# Patient Record
Sex: FEMALE | Race: WHITE | NOT HISPANIC OR LATINO
[De-identification: names, ages, dates, MRNs, and addresses within clinical notes are randomized per-mention and may not be internally consistent; named-entity substitution may affect disease eponyms.]

---

## 2017-01-18 ENCOUNTER — RX RENEWAL (OUTPATIENT)
Age: 82
End: 2017-01-18

## 2017-05-03 ENCOUNTER — APPOINTMENT (OUTPATIENT)
Dept: PULMONOLOGY | Facility: CLINIC | Age: 82
End: 2017-05-03

## 2017-05-03 VITALS
BODY MASS INDEX: 22.72 KG/M2 | DIASTOLIC BLOOD PRESSURE: 76 MMHG | WEIGHT: 105 LBS | OXYGEN SATURATION: 99 % | SYSTOLIC BLOOD PRESSURE: 146 MMHG | HEART RATE: 71 BPM

## 2017-05-03 VITALS — DIASTOLIC BLOOD PRESSURE: 78 MMHG | SYSTOLIC BLOOD PRESSURE: 142 MMHG

## 2017-05-04 LAB
ALBUMIN SERPL ELPH-MCNC: 4.5 G/DL
ALP BLD-CCNC: 70 U/L
ALT SERPL-CCNC: 16 U/L
ANION GAP SERPL CALC-SCNC: 15 MMOL/L
AST SERPL-CCNC: 21 U/L
BASOPHILS # BLD AUTO: 0.03 K/UL
BASOPHILS NFR BLD AUTO: 0.4 %
BILIRUB SERPL-MCNC: <0.2 MG/DL
BUN SERPL-MCNC: 26 MG/DL
CALCIUM SERPL-MCNC: 9.7 MG/DL
CHLORIDE SERPL-SCNC: 103 MMOL/L
CO2 SERPL-SCNC: 23 MMOL/L
CREAT SERPL-MCNC: 1.17 MG/DL
EOSINOPHIL # BLD AUTO: 0.06 K/UL
EOSINOPHIL NFR BLD AUTO: 0.7 %
GLUCOSE SERPL-MCNC: 98 MG/DL
HBA1C MFR BLD HPLC: 5.8 %
HCT VFR BLD CALC: 39.4 %
HGB BLD-MCNC: 12.6 G/DL
IMM GRANULOCYTES NFR BLD AUTO: 0.2 %
LYMPHOCYTES # BLD AUTO: 2.3 K/UL
LYMPHOCYTES NFR BLD AUTO: 27.5 %
MAN DIFF?: NORMAL
MCHC RBC-ENTMCNC: 30.3 PG
MCHC RBC-ENTMCNC: 32 GM/DL
MCV RBC AUTO: 94.7 FL
MONOCYTES # BLD AUTO: 0.37 K/UL
MONOCYTES NFR BLD AUTO: 4.4 %
NEUTROPHILS # BLD AUTO: 5.57 K/UL
NEUTROPHILS NFR BLD AUTO: 66.8 %
PLATELET # BLD AUTO: 325 K/UL
POTASSIUM SERPL-SCNC: 4.5 MMOL/L
PROT SERPL-MCNC: 7.8 G/DL
RBC # BLD: 4.16 M/UL
RBC # FLD: 15 %
SODIUM SERPL-SCNC: 141 MMOL/L
WBC # FLD AUTO: 8.35 K/UL

## 2017-05-11 ENCOUNTER — RX RENEWAL (OUTPATIENT)
Age: 82
End: 2017-05-11

## 2017-08-09 ENCOUNTER — APPOINTMENT (OUTPATIENT)
Dept: PULMONOLOGY | Facility: CLINIC | Age: 82
End: 2017-08-09
Payer: MEDICARE

## 2017-08-09 VITALS
WEIGHT: 105 LBS | HEART RATE: 73 BPM | HEIGHT: 57 IN | RESPIRATION RATE: 16 BRPM | BODY MASS INDEX: 22.65 KG/M2 | OXYGEN SATURATION: 99 % | SYSTOLIC BLOOD PRESSURE: 128 MMHG | DIASTOLIC BLOOD PRESSURE: 78 MMHG

## 2017-08-09 DIAGNOSIS — M25.511 PAIN IN RIGHT SHOULDER: ICD-10-CM

## 2017-08-09 PROCEDURE — 99213 OFFICE O/P EST LOW 20 MIN: CPT

## 2017-11-01 ENCOUNTER — APPOINTMENT (OUTPATIENT)
Dept: PULMONOLOGY | Facility: CLINIC | Age: 82
End: 2017-11-01
Payer: MEDICARE

## 2017-11-01 ENCOUNTER — NON-APPOINTMENT (OUTPATIENT)
Age: 82
End: 2017-11-01

## 2017-11-01 VITALS
HEIGHT: 57 IN | DIASTOLIC BLOOD PRESSURE: 88 MMHG | HEART RATE: 75 BPM | TEMPERATURE: 97.6 F | WEIGHT: 107 LBS | OXYGEN SATURATION: 100 % | SYSTOLIC BLOOD PRESSURE: 154 MMHG | BODY MASS INDEX: 23.08 KG/M2 | RESPIRATION RATE: 16 BRPM

## 2017-11-01 PROCEDURE — 36415 COLL VENOUS BLD VENIPUNCTURE: CPT

## 2017-11-01 PROCEDURE — 99215 OFFICE O/P EST HI 40 MIN: CPT | Mod: 25

## 2017-11-01 PROCEDURE — 93000 ELECTROCARDIOGRAM COMPLETE: CPT

## 2017-11-04 ENCOUNTER — RX RENEWAL (OUTPATIENT)
Age: 82
End: 2017-11-04

## 2017-11-04 LAB
25(OH)D3 SERPL-MCNC: 16 NG/ML
ALBUMIN SERPL ELPH-MCNC: 3.9 G/DL
ALP BLD-CCNC: 67 U/L
ALT SERPL-CCNC: 17 U/L
ANION GAP SERPL CALC-SCNC: 18 MMOL/L
AST SERPL-CCNC: 22 U/L
BASOPHILS # BLD AUTO: 0.02 K/UL
BASOPHILS NFR BLD AUTO: 0.3 %
BILIRUB SERPL-MCNC: 0.3 MG/DL
BUN SERPL-MCNC: 26 MG/DL
CALCIUM SERPL-MCNC: 9.3 MG/DL
CHLORIDE SERPL-SCNC: 107 MMOL/L
CHOLEST SERPL-MCNC: 180 MG/DL
CHOLEST/HDLC SERPL: 3.3 RATIO
CK SERPL-CCNC: 133 U/L
CO2 SERPL-SCNC: 20 MMOL/L
CREAT SERPL-MCNC: 1.21 MG/DL
EOSINOPHIL # BLD AUTO: 0.02 K/UL
EOSINOPHIL NFR BLD AUTO: 0.3 %
GLUCOSE SERPL-MCNC: 88 MG/DL
HBA1C MFR BLD HPLC: 5.5 %
HCT VFR BLD CALC: 38.6 %
HDLC SERPL-MCNC: 54 MG/DL
HGB BLD-MCNC: 12 G/DL
IMM GRANULOCYTES NFR BLD AUTO: 0.1 %
LDLC SERPL CALC-MCNC: 108 MG/DL
LYMPHOCYTES # BLD AUTO: 1.74 K/UL
LYMPHOCYTES NFR BLD AUTO: 22.7 %
MAN DIFF?: NORMAL
MCHC RBC-ENTMCNC: 29.5 PG
MCHC RBC-ENTMCNC: 31.1 GM/DL
MCV RBC AUTO: 94.8 FL
MONOCYTES # BLD AUTO: 0.36 K/UL
MONOCYTES NFR BLD AUTO: 4.7 %
NEUTROPHILS # BLD AUTO: 5.53 K/UL
NEUTROPHILS NFR BLD AUTO: 71.9 %
PLATELET # BLD AUTO: 287 K/UL
POTASSIUM SERPL-SCNC: 4.3 MMOL/L
PROT SERPL-MCNC: 7.2 G/DL
RBC # BLD: 4.07 M/UL
RBC # FLD: 14.9 %
SODIUM SERPL-SCNC: 145 MMOL/L
TRIGL SERPL-MCNC: 90 MG/DL
TSH SERPL-ACNC: 1.96 UIU/ML
WBC # FLD AUTO: 7.68 K/UL

## 2018-01-29 ENCOUNTER — RX RENEWAL (OUTPATIENT)
Age: 83
End: 2018-01-29

## 2018-03-07 ENCOUNTER — APPOINTMENT (OUTPATIENT)
Dept: PULMONOLOGY | Facility: CLINIC | Age: 83
End: 2018-03-07

## 2018-04-30 ENCOUNTER — RX RENEWAL (OUTPATIENT)
Age: 83
End: 2018-04-30

## 2018-07-02 ENCOUNTER — RX RENEWAL (OUTPATIENT)
Age: 83
End: 2018-07-02

## 2018-09-05 ENCOUNTER — RX RENEWAL (OUTPATIENT)
Age: 83
End: 2018-09-05

## 2018-09-14 ENCOUNTER — APPOINTMENT (OUTPATIENT)
Dept: PULMONOLOGY | Facility: CLINIC | Age: 83
End: 2018-09-14
Payer: MEDICARE

## 2018-09-14 VITALS
RESPIRATION RATE: 14 BRPM | WEIGHT: 108 LBS | TEMPERATURE: 97.5 F | DIASTOLIC BLOOD PRESSURE: 70 MMHG | OXYGEN SATURATION: 99 % | BODY MASS INDEX: 23.37 KG/M2 | SYSTOLIC BLOOD PRESSURE: 130 MMHG | HEART RATE: 63 BPM

## 2018-09-14 PROCEDURE — 36415 COLL VENOUS BLD VENIPUNCTURE: CPT

## 2018-09-14 PROCEDURE — 99215 OFFICE O/P EST HI 40 MIN: CPT | Mod: 25

## 2018-09-21 ENCOUNTER — CLINICAL ADVICE (OUTPATIENT)
Age: 83
End: 2018-09-21

## 2018-09-21 LAB
25(OH)D3 SERPL-MCNC: 15.5 NG/ML
ALBUMIN SERPL ELPH-MCNC: 4.4 G/DL
ALP BLD-CCNC: 66 U/L
ALT SERPL-CCNC: 10 U/L
ANION GAP SERPL CALC-SCNC: 15 MMOL/L
AST SERPL-CCNC: 17 U/L
BASOPHILS # BLD AUTO: 0.02 K/UL
BASOPHILS NFR BLD AUTO: 0.2 %
BILIRUB SERPL-MCNC: 0.3 MG/DL
BUN SERPL-MCNC: 27 MG/DL
CALCIUM SERPL-MCNC: 9.6 MG/DL
CHLORIDE SERPL-SCNC: 106 MMOL/L
CHOLEST SERPL-MCNC: 168 MG/DL
CHOLEST/HDLC SERPL: 3.4 RATIO
CK SERPL-CCNC: 120 U/L
CO2 SERPL-SCNC: 23 MMOL/L
CREAT SERPL-MCNC: 1.03 MG/DL
EOSINOPHIL # BLD AUTO: 0.03 K/UL
EOSINOPHIL NFR BLD AUTO: 0.3 %
GLUCOSE SERPL-MCNC: 99 MG/DL
HBA1C MFR BLD HPLC: 5.7 %
HCT VFR BLD CALC: 40.4 %
HDLC SERPL-MCNC: 50 MG/DL
HGB BLD-MCNC: 12.4 G/DL
IMM GRANULOCYTES NFR BLD AUTO: 0.2 %
LDLC SERPL CALC-MCNC: 97 MG/DL
LYMPHOCYTES # BLD AUTO: 2.08 K/UL
LYMPHOCYTES NFR BLD AUTO: 20.4 %
MAN DIFF?: NORMAL
MCHC RBC-ENTMCNC: 29.4 PG
MCHC RBC-ENTMCNC: 30.7 GM/DL
MCV RBC AUTO: 95.7 FL
MONOCYTES # BLD AUTO: 0.52 K/UL
MONOCYTES NFR BLD AUTO: 5.1 %
NEUTROPHILS # BLD AUTO: 7.53 K/UL
NEUTROPHILS NFR BLD AUTO: 73.8 %
PLATELET # BLD AUTO: 295 K/UL
POTASSIUM SERPL-SCNC: 4.8 MMOL/L
PROT SERPL-MCNC: 7.5 G/DL
RBC # BLD: 4.22 M/UL
RBC # FLD: 14.8 %
SODIUM SERPL-SCNC: 144 MMOL/L
TRIGL SERPL-MCNC: 103 MG/DL
TSH SERPL-ACNC: 1.96 UIU/ML
VIT B12 SERPL-MCNC: 283 PG/ML
WBC # FLD AUTO: 10.2 K/UL

## 2018-11-05 ENCOUNTER — RX RENEWAL (OUTPATIENT)
Age: 83
End: 2018-11-05

## 2019-03-13 ENCOUNTER — APPOINTMENT (OUTPATIENT)
Dept: PULMONOLOGY | Facility: CLINIC | Age: 84
End: 2019-03-13
Payer: MEDICARE

## 2019-03-13 VITALS
SYSTOLIC BLOOD PRESSURE: 144 MMHG | DIASTOLIC BLOOD PRESSURE: 84 MMHG | OXYGEN SATURATION: 100 % | WEIGHT: 110 LBS | HEART RATE: 76 BPM | TEMPERATURE: 97.6 F | RESPIRATION RATE: 16 BRPM | BODY MASS INDEX: 23.8 KG/M2

## 2019-03-13 PROCEDURE — 99215 OFFICE O/P EST HI 40 MIN: CPT | Mod: 25

## 2019-03-13 PROCEDURE — 36415 COLL VENOUS BLD VENIPUNCTURE: CPT

## 2019-03-13 NOTE — PHYSICAL EXAM
[Normal Appearance] : normal appearance [General Appearance - In No Acute Distress] : no acute distress [Normal Conjunctiva] : the conjunctiva exhibited no abnormalities [Normal Oropharynx] : normal oropharynx [Neck Appearance] : the appearance of the neck was normal [Jugular Venous Distention Increased] : there was no jugular-venous distention [Thyroid Diffuse Enlargement] : the thyroid was not enlarged [Heart Rate And Rhythm] : heart rate and rhythm were normal [Heart Sounds] : normal S1 and S2 [Murmurs] : no murmurs present [Auscultation Breath Sounds / Voice Sounds] : lungs were clear to auscultation bilaterally [Bowel Sounds] : normal bowel sounds [Abdomen Soft] : soft [Abdomen Tenderness] : non-tender [Abnormal Walk] : normal gait [Nail Clubbing] : no clubbing of the fingernails [Cyanosis, Localized] : no localized cyanosis [] : no rash [No Focal Deficits] : no focal deficits [Oriented To Time, Place, And Person] : oriented to person, place, and time [Impaired Insight] : insight and judgment were intact [Erythema] : no erythema of the pharynx [FreeTextEntry1] : Crepitations noted in the right shoulder with movement. Skin intact. No swelling. No redness.

## 2019-03-13 NOTE — HISTORY OF PRESENT ILLNESS
[FreeTextEntry1] : Has been having numbness in her feet. She was seen by a podiatrist who told her she may have neuropathy. She is able to ambulate well but not as well as before. Continues to go to work. Also takes the bus. No other complaints.\par \par

## 2019-03-13 NOTE — REVIEW OF SYSTEMS
[Hypertension] : ~T hypertension [Numbness] : numbness [Memory Loss] : ~T memory lapses or loss [Fatigue] : no fatigue [Nasal Congestion] : no nasal congestion [Epistaxis] : no nosebleeds [Cough] : no cough [Dyspnea] : no dyspnea [Chest Tightness] : no chest tightness [Wheezing] : no wheezing [PND] : no PND [Orthopnea] : no orthopnea [Murmurs] : no murmurs were heard [Palpitations] : no palpitations [Edema] : ~T edema was not present [Nasal Discharge] : no nasal discharge [Heartburn] : no heartburn [Indigestion] : no indigestion [Dysuria] : no dysuria [Back Pain] : ~T no back pain [Myalgias] : no myalgias [Arthralgias] : no arthralgias [Rash] : no [unfilled] rash [Anemia] : no anemia [Easy Bruising] : no ~M tendency for easy bruising [Headache] : no headache [Dizziness] : no dizziness [Syncope] : no fainting [Focal Weakness] : no focal weakness [Paralysis] : no paralysis was seen [Seizures] : no seizures [Head Injury] : no head injury [Involuntary Movements] : ~T no involuntary movements [Tremor] : ~T no ~M tremor was seen [Anxiety] : no anxiety [Diabetes] : no diabetes mellitus [Thyroid Problem] : no thyroid problem [DVT] : no DVT [Pulmonary Embolism] : no pulmonary embolism [Difficulty Initiating Sleep] : no difficulty falling asleep [Difficulty Maintaining Sleep] : no difficulty maintaining sleep

## 2019-03-13 NOTE — DISCUSSION/SUMMARY
[FreeTextEntry1] : She is a 89 year-old woman with a history of hypertension. She also has a low vitamin D level. No history of diabetes. Complaining of numbness in her feet.\par \par Her blood pressure is adequately controlled. Blood tests will be repeated. She was advised to see a neurologist. Also she is to continue to take vitamin D. She should obtain a medical alert system given that she lives alone. Her daughter-in-law is present and agrees.\par \par I would like to see her again in three months. Sooner if needed.

## 2019-03-18 ENCOUNTER — CLINICAL ADVICE (OUTPATIENT)
Age: 84
End: 2019-03-18

## 2019-03-18 LAB
25(OH)D3 SERPL-MCNC: 30.2 NG/ML
ALBUMIN SERPL ELPH-MCNC: 4.3 G/DL
ALP BLD-CCNC: 71 U/L
ALT SERPL-CCNC: 12 U/L
ANION GAP SERPL CALC-SCNC: 16 MMOL/L
AST SERPL-CCNC: 19 U/L
BASOPHILS # BLD AUTO: 0.04 K/UL
BASOPHILS NFR BLD AUTO: 0.4 %
BILIRUB SERPL-MCNC: 0.2 MG/DL
BUN SERPL-MCNC: 28 MG/DL
CALCIUM SERPL-MCNC: 9.8 MG/DL
CHLORIDE SERPL-SCNC: 103 MMOL/L
CO2 SERPL-SCNC: 24 MMOL/L
CREAT SERPL-MCNC: 1.12 MG/DL
EOSINOPHIL # BLD AUTO: 0.04 K/UL
EOSINOPHIL NFR BLD AUTO: 0.4 %
GLUCOSE SERPL-MCNC: 107 MG/DL
HBA1C MFR BLD HPLC: 5.6 %
HCT VFR BLD CALC: 42.1 %
HGB BLD-MCNC: 12.5 G/DL
IMM GRANULOCYTES NFR BLD AUTO: 0.3 %
LYMPHOCYTES # BLD AUTO: 2.26 K/UL
LYMPHOCYTES NFR BLD AUTO: 21.5 %
MAN DIFF?: NORMAL
MCHC RBC-ENTMCNC: 29.2 PG
MCHC RBC-ENTMCNC: 29.7 GM/DL
MCV RBC AUTO: 98.4 FL
MONOCYTES # BLD AUTO: 0.6 K/UL
MONOCYTES NFR BLD AUTO: 5.7 %
NEUTROPHILS # BLD AUTO: 7.55 K/UL
NEUTROPHILS NFR BLD AUTO: 71.7 %
PLATELET # BLD AUTO: 298 K/UL
POTASSIUM SERPL-SCNC: 5.1 MMOL/L
PROT SERPL-MCNC: 7.4 G/DL
RBC # BLD: 4.28 M/UL
RBC # FLD: 14.3 %
SODIUM SERPL-SCNC: 143 MMOL/L
VIT B12 SERPL-MCNC: 227 PG/ML
WBC # FLD AUTO: 10.52 K/UL

## 2019-04-29 ENCOUNTER — APPOINTMENT (OUTPATIENT)
Dept: PULMONOLOGY | Facility: CLINIC | Age: 84
End: 2019-04-29
Payer: MEDICARE

## 2019-04-29 VITALS
BODY MASS INDEX: 24.24 KG/M2 | RESPIRATION RATE: 16 BRPM | OXYGEN SATURATION: 99 % | HEART RATE: 75 BPM | TEMPERATURE: 97.7 F | DIASTOLIC BLOOD PRESSURE: 90 MMHG | SYSTOLIC BLOOD PRESSURE: 142 MMHG | WEIGHT: 112 LBS

## 2019-04-29 DIAGNOSIS — G62.9 POLYNEUROPATHY, UNSPECIFIED: ICD-10-CM

## 2019-04-29 PROCEDURE — 36415 COLL VENOUS BLD VENIPUNCTURE: CPT

## 2019-04-29 PROCEDURE — 99214 OFFICE O/P EST MOD 30 MIN: CPT | Mod: 25

## 2019-04-29 NOTE — HISTORY OF PRESENT ILLNESS
[FreeTextEntry1] : Continues to have numbness in her feet. She was seen by Neurology. Had an MRI. Placed on gabapentin. Does not appear to be helping her much. Vitamin B12 and Vitamin D3. Also gabapentin 300 mg daily. \par

## 2019-04-29 NOTE — REVIEW OF SYSTEMS
[Hypertension] : ~T hypertension [Numbness] : numbness [Memory Loss] : ~T memory lapses or loss [Fatigue] : no fatigue [Nasal Congestion] : no nasal congestion [Epistaxis] : no nosebleeds [Cough] : no cough [Dyspnea] : no dyspnea [Wheezing] : no wheezing [Chest Discomfort] : no chest discomfort [PND] : no PND [Orthopnea] : no orthopnea [Murmurs] : no murmurs were heard [Palpitations] : no palpitations [Edema] : ~T edema was not present [Nasal Discharge] : no nasal discharge [Heartburn] : no heartburn [Indigestion] : no indigestion [Dysuria] : no dysuria [Back Pain] : ~T no back pain [Myalgias] : no myalgias [Arthralgias] : no arthralgias [Rash] : no [unfilled] rash [Anemia] : no anemia [Easy Bruising] : no ~M tendency for easy bruising [Headache] : no headache [Dizziness] : no dizziness [Syncope] : no fainting [Focal Weakness] : no focal weakness [Paralysis] : no paralysis was seen [Seizures] : no seizures [Head Injury] : no head injury [Involuntary Movements] : ~T no involuntary movements [Tremor] : ~T no ~M tremor was seen [Anxiety] : no anxiety [Diabetes] : no diabetes mellitus [Thyroid Problem] : no thyroid problem [DVT] : no DVT [Pulmonary Embolism] : no pulmonary embolism [Difficulty Initiating Sleep] : no difficulty falling asleep [Difficulty Maintaining Sleep] : no difficulty maintaining sleep

## 2019-04-29 NOTE — DISCUSSION/SUMMARY
[FreeTextEntry1] : She is a 89 year-old woman with a history of hypertension. She also has a low vitamin D level. No history of diabetes. Complaining of numbness in her feet.\par \par Her blood pressure is adequately controlled. Now on Vit B 12 oral. Will get a level. She is to continue to take vitamin D. \par \par She should obtain a medical alert system given that she lives alone. Her daughter-in-law is present and agrees.\par \par I would like to see her again in one month. Sooner if needed.

## 2019-04-30 LAB — VIT B12 SERPL-MCNC: 826 PG/ML

## 2019-06-12 ENCOUNTER — APPOINTMENT (OUTPATIENT)
Dept: PULMONOLOGY | Facility: CLINIC | Age: 84
End: 2019-06-12

## 2019-06-17 ENCOUNTER — APPOINTMENT (OUTPATIENT)
Dept: PULMONOLOGY | Facility: CLINIC | Age: 84
End: 2019-06-17
Payer: MEDICARE

## 2019-06-17 VITALS
SYSTOLIC BLOOD PRESSURE: 150 MMHG | RESPIRATION RATE: 12 BRPM | OXYGEN SATURATION: 96 % | WEIGHT: 112 LBS | HEART RATE: 75 BPM | TEMPERATURE: 97.9 F | DIASTOLIC BLOOD PRESSURE: 84 MMHG | BODY MASS INDEX: 24.24 KG/M2

## 2019-06-17 PROCEDURE — 36415 COLL VENOUS BLD VENIPUNCTURE: CPT

## 2019-06-17 PROCEDURE — 99214 OFFICE O/P EST MOD 30 MIN: CPT | Mod: 25

## 2019-06-17 NOTE — HISTORY OF PRESENT ILLNESS
[FreeTextEntry1] : She is feeing the same. She continues to have numbness in her feet. She was seen by Neurology. Had an MRI. Will need another MDI with contrast. \par \par She has been placed on gabapentin. Does not appear to be helping her much. Also taking Vitamin B 12 and Vitamin D 3. \par

## 2019-06-17 NOTE — REVIEW OF SYSTEMS
[Hypertension] : ~T hypertension [Numbness] : numbness [Memory Loss] : ~T memory lapses or loss [Fever] : no fever [Fatigue] : no fatigue [Nasal Congestion] : no nasal congestion [Epistaxis] : no nosebleeds [Cough] : no cough [Dyspnea] : no dyspnea [Chest Tightness] : no chest tightness [Wheezing] : no wheezing [Chest Discomfort] : no chest discomfort [PND] : no PND [Orthopnea] : no orthopnea [Murmurs] : no murmurs were heard [Palpitations] : no palpitations [Edema] : ~T edema was not present [Nasal Discharge] : no nasal discharge [Heartburn] : no heartburn [Indigestion] : no indigestion [Dysuria] : no dysuria [Back Pain] : ~T no back pain [Arthralgias] : no arthralgias [Myalgias] : no myalgias [Rash] : no [unfilled] rash [Anemia] : no anemia [Headache] : no headache [Easy Bruising] : no ~M tendency for easy bruising [Dizziness] : no dizziness [Syncope] : no fainting [Focal Weakness] : no focal weakness [Paralysis] : no paralysis was seen [Seizures] : no seizures [Head Injury] : no head injury [Involuntary Movements] : ~T no involuntary movements [Tremor] : ~T no ~M tremor was seen [Anxiety] : no anxiety [DVT] : no DVT [Thyroid Problem] : no thyroid problem [Diabetes] : no diabetes mellitus [Pulmonary Embolism] : no pulmonary embolism [Difficulty Initiating Sleep] : no difficulty falling asleep

## 2019-06-17 NOTE — PHYSICAL EXAM
[General Appearance - In No Acute Distress] : no acute distress [Normal Conjunctiva] : the conjunctiva exhibited no abnormalities [Normal Oropharynx] : normal oropharynx [Neck Appearance] : the appearance of the neck was normal [Thyroid Diffuse Enlargement] : the thyroid was not enlarged [Jugular Venous Distention Increased] : there was no jugular-venous distention [Heart Sounds] : normal S1 and S2 [Auscultation Breath Sounds / Voice Sounds] : lungs were clear to auscultation bilaterally [Bowel Sounds] : normal bowel sounds [Abdomen Soft] : soft [Abdomen Tenderness] : non-tender [Abnormal Walk] : normal gait [Nail Clubbing] : no clubbing of the fingernails [Cyanosis, Localized] : no localized cyanosis [] : no rash [No Focal Deficits] : no focal deficits [Oriented To Time, Place, And Person] : oriented to person, place, and time [Impaired Insight] : insight and judgment were intact [Erythema] : no erythema of the pharynx [FreeTextEntry1] : Crepitations noted in the right shoulder with movement. Skin intact. No swelling. No redness.

## 2019-06-17 NOTE — DISCUSSION/SUMMARY
[FreeTextEntry1] : She is a 89 year-old woman with a history of hypertension.  She has been complaining of numbness in her feet. Has been seen by Neurology. Work up in progress. \par \par She should obtain a medical alert system given that she lives alone. Her daughter-in-law is present and agrees.Also she does not have a cell phone. Was advised to get one. \par \par Blood work obtained. To follow up with Neurology.

## 2019-06-19 LAB
ALBUMIN SERPL ELPH-MCNC: 4.2 G/DL
ALP BLD-CCNC: 71 U/L
ALT SERPL-CCNC: 12 U/L
ANION GAP SERPL CALC-SCNC: 14 MMOL/L
AST SERPL-CCNC: 13 U/L
BASOPHILS # BLD AUTO: 0.04 K/UL
BASOPHILS NFR BLD AUTO: 0.5 %
BILIRUB SERPL-MCNC: 0.2 MG/DL
BUN SERPL-MCNC: 25 MG/DL
CALCIUM SERPL-MCNC: 10 MG/DL
CHLORIDE SERPL-SCNC: 106 MMOL/L
CHOLEST SERPL-MCNC: 172 MG/DL
CHOLEST/HDLC SERPL: 3.4 RATIO
CO2 SERPL-SCNC: 23 MMOL/L
CREAT SERPL-MCNC: 1.17 MG/DL
EOSINOPHIL # BLD AUTO: 0.06 K/UL
EOSINOPHIL NFR BLD AUTO: 0.8 %
ESTIMATED AVERAGE GLUCOSE: 117 MG/DL
GLUCOSE SERPL-MCNC: 93 MG/DL
HBA1C MFR BLD HPLC: 5.7 %
HCT VFR BLD CALC: 39.6 %
HDLC SERPL-MCNC: 50 MG/DL
HGB BLD-MCNC: 12 G/DL
IMM GRANULOCYTES NFR BLD AUTO: 0.3 %
LDLC SERPL CALC-MCNC: 102 MG/DL
LYMPHOCYTES # BLD AUTO: 2.12 K/UL
LYMPHOCYTES NFR BLD AUTO: 27.4 %
MAN DIFF?: NORMAL
MCHC RBC-ENTMCNC: 28.8 PG
MCHC RBC-ENTMCNC: 30.3 GM/DL
MCV RBC AUTO: 95.2 FL
MONOCYTES # BLD AUTO: 0.43 K/UL
MONOCYTES NFR BLD AUTO: 5.5 %
NEUTROPHILS # BLD AUTO: 5.08 K/UL
NEUTROPHILS NFR BLD AUTO: 65.5 %
PLATELET # BLD AUTO: 254 K/UL
POTASSIUM SERPL-SCNC: 4.4 MMOL/L
PROT SERPL-MCNC: 7.2 G/DL
RBC # BLD: 4.16 M/UL
RBC # FLD: 13.6 %
SODIUM SERPL-SCNC: 143 MMOL/L
TRIGL SERPL-MCNC: 98 MG/DL
WBC # FLD AUTO: 7.75 K/UL

## 2019-07-09 ENCOUNTER — APPOINTMENT (OUTPATIENT)
Dept: SPINE | Facility: CLINIC | Age: 84
End: 2019-07-09
Payer: MEDICARE

## 2019-07-09 VITALS — BODY MASS INDEX: 23.15 KG/M2 | SYSTOLIC BLOOD PRESSURE: 172 MMHG | DIASTOLIC BLOOD PRESSURE: 87 MMHG | WEIGHT: 107 LBS

## 2019-07-09 VITALS
HEIGHT: 57 IN | TEMPERATURE: 97.7 F | HEART RATE: 75 BPM | SYSTOLIC BLOOD PRESSURE: 189 MMHG | RESPIRATION RATE: 12 BRPM | DIASTOLIC BLOOD PRESSURE: 77 MMHG

## 2019-07-09 DIAGNOSIS — H91.90 UNSPECIFIED HEARING LOSS, UNSPECIFIED EAR: ICD-10-CM

## 2019-07-09 PROCEDURE — 99204 OFFICE O/P NEW MOD 45 MIN: CPT

## 2019-07-09 RX ORDER — CHLORHEXIDINE GLUCONATE 4 %
1000 LIQUID (ML) TOPICAL
Refills: 0 | Status: ACTIVE | COMMUNITY

## 2019-07-09 RX ORDER — MULTIVIT-MIN/FOLIC/VIT K/LYCOP 400-300MCG
50 MCG TABLET ORAL
Refills: 0 | Status: ACTIVE | COMMUNITY

## 2019-07-12 NOTE — CONSULT LETTER
[Dear  ___] : Dear  [unfilled], [DrCampos  ___] : Dr. MCINTYRE [FreeTextEntry2] : Umer Levi MD\par [address]\[place] [address]

## 2019-07-12 NOTE — REVIEW OF SYSTEMS
[Tingling] : tingling [Numbness] : numbness [Difficulty Walking] : difficulty walking [Abnormal Sensation] : an abnormal sensation [Negative] : Heme/Lymph [FreeTextEntry1] : no bowel or bladder symptoms

## 2019-07-12 NOTE — ASSESSMENT
[FreeTextEntry1] : Assess:\par Thoracic lesion at T 8 \par Abnormal gait / magnetic and left hand numbness\par \par PLAN:\par Recommend surgery for tumor removal and fusion\par Rehab post op most likely post operatively  \par Risks and benefits reviewed\par CT of thoracic spine preop purposes\par Patient to schedule surgery\par

## 2019-07-12 NOTE — HISTORY OF PRESENT ILLNESS
[FreeTextEntry1] : T 8 lesion  [de-identified] : 89 year old with instability  of her gait and recent left hand numbness.  She has noted progressive difficulty walking with tightness of her lower extremities.  She denies falls and has had imaging performed by a neurologist.  The images show a thoracic  lesion at T8.  She has no bowel or bladder symptoms.

## 2019-07-12 NOTE — REASON FOR VISIT
[New Patient Visit] : a new patient visit [Family Member] : family member [FreeTextEntry1] : Thoracic  tumor

## 2019-07-19 ENCOUNTER — OUTPATIENT (OUTPATIENT)
Dept: OUTPATIENT SERVICES | Facility: HOSPITAL | Age: 84
LOS: 1 days | End: 2019-07-19
Payer: MEDICARE

## 2019-07-19 VITALS
WEIGHT: 106.04 LBS | HEIGHT: 56 IN | SYSTOLIC BLOOD PRESSURE: 160 MMHG | RESPIRATION RATE: 18 BRPM | TEMPERATURE: 98 F | HEART RATE: 77 BPM | OXYGEN SATURATION: 99 % | DIASTOLIC BLOOD PRESSURE: 80 MMHG

## 2019-07-19 DIAGNOSIS — Z29.9 ENCOUNTER FOR PROPHYLACTIC MEASURES, UNSPECIFIED: ICD-10-CM

## 2019-07-19 DIAGNOSIS — S24.113A COMPLETE LESION AT T7-T10 LEVEL OF THORACIC SPINAL CORD, INITIAL ENCOUNTER: ICD-10-CM

## 2019-07-19 DIAGNOSIS — Z01.818 ENCOUNTER FOR OTHER PREPROCEDURAL EXAMINATION: ICD-10-CM

## 2019-07-19 DIAGNOSIS — Z98.890 OTHER SPECIFIED POSTPROCEDURAL STATES: Chronic | ICD-10-CM

## 2019-07-19 DIAGNOSIS — Z96.642 PRESENCE OF LEFT ARTIFICIAL HIP JOINT: Chronic | ICD-10-CM

## 2019-07-19 LAB
ANION GAP SERPL CALC-SCNC: 13 MMOL/L — SIGNIFICANT CHANGE UP (ref 5–17)
BLD GP AB SCN SERPL QL: NEGATIVE — SIGNIFICANT CHANGE UP
BUN SERPL-MCNC: 25 MG/DL — HIGH (ref 7–23)
CALCIUM SERPL-MCNC: 9.6 MG/DL — SIGNIFICANT CHANGE UP (ref 8.4–10.5)
CHLORIDE SERPL-SCNC: 105 MMOL/L — SIGNIFICANT CHANGE UP (ref 96–108)
CO2 SERPL-SCNC: 23 MMOL/L — SIGNIFICANT CHANGE UP (ref 22–31)
CREAT SERPL-MCNC: 1.08 MG/DL — SIGNIFICANT CHANGE UP (ref 0.5–1.3)
GLUCOSE SERPL-MCNC: 93 MG/DL — SIGNIFICANT CHANGE UP (ref 70–99)
HCT VFR BLD CALC: 41.6 % — SIGNIFICANT CHANGE UP (ref 34.5–45)
HGB BLD-MCNC: 12.9 G/DL — SIGNIFICANT CHANGE UP (ref 11.5–15.5)
MCHC RBC-ENTMCNC: 29.1 PG — SIGNIFICANT CHANGE UP (ref 27–34)
MCHC RBC-ENTMCNC: 31 GM/DL — LOW (ref 32–36)
MCV RBC AUTO: 93.9 FL — SIGNIFICANT CHANGE UP (ref 80–100)
PLATELET # BLD AUTO: 242 K/UL — SIGNIFICANT CHANGE UP (ref 150–400)
POTASSIUM SERPL-MCNC: 4.2 MMOL/L — SIGNIFICANT CHANGE UP (ref 3.5–5.3)
POTASSIUM SERPL-SCNC: 4.2 MMOL/L — SIGNIFICANT CHANGE UP (ref 3.5–5.3)
RBC # BLD: 4.43 M/UL — SIGNIFICANT CHANGE UP (ref 3.8–5.2)
RBC # FLD: 13.8 % — SIGNIFICANT CHANGE UP (ref 10.3–14.5)
RH IG SCN BLD-IMP: POSITIVE — SIGNIFICANT CHANGE UP
SODIUM SERPL-SCNC: 141 MMOL/L — SIGNIFICANT CHANGE UP (ref 135–145)
WBC # BLD: 8.67 K/UL — SIGNIFICANT CHANGE UP (ref 3.8–10.5)
WBC # FLD AUTO: 8.67 K/UL — SIGNIFICANT CHANGE UP (ref 3.8–10.5)

## 2019-07-19 PROCEDURE — 85027 COMPLETE CBC AUTOMATED: CPT

## 2019-07-19 PROCEDURE — 86901 BLOOD TYPING SEROLOGIC RH(D): CPT

## 2019-07-19 PROCEDURE — 86850 RBC ANTIBODY SCREEN: CPT

## 2019-07-19 PROCEDURE — 87640 STAPH A DNA AMP PROBE: CPT

## 2019-07-19 PROCEDURE — 87641 MR-STAPH DNA AMP PROBE: CPT

## 2019-07-19 PROCEDURE — G0463: CPT

## 2019-07-19 PROCEDURE — 86900 BLOOD TYPING SEROLOGIC ABO: CPT

## 2019-07-19 PROCEDURE — 80048 BASIC METABOLIC PNL TOTAL CA: CPT

## 2019-07-19 RX ORDER — CEFAZOLIN SODIUM 1 G
2000 VIAL (EA) INJECTION ONCE
Refills: 0 | Status: COMPLETED | OUTPATIENT
Start: 2019-07-29 | End: 2019-07-29

## 2019-07-19 RX ORDER — LIDOCAINE HCL 20 MG/ML
0.2 VIAL (ML) INJECTION ONCE
Refills: 0 | Status: DISCONTINUED | OUTPATIENT
Start: 2019-07-29 | End: 2019-08-03

## 2019-07-19 NOTE — H&P PST ADULT - NSICDXPROBLEM_GEN_ALL_CORE_FT
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 Surgical patients in this group will benefit from both pharmacologic prophylaxis and intermittent compression devices.  The surgical team will determine the balance between VTE risk and bleeding risk, and other clinical considerations

## 2019-07-19 NOTE — H&P PST ADULT - NSICDXPASTMEDICALHX_GEN_ALL_CORE_FT
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

## 2019-07-19 NOTE — H&P PST ADULT - PRIMARY CARE PROVIDER
Dr Gandara [phone] - Next appointment 7/24/19 Dr Gandara [facility] [phone] - Next appointment 7/24/19

## 2019-07-19 NOTE — H&P PST ADULT - HISTORY OF PRESENT ILLNESS
89 year old with c/o  instability of her gait and balance , She also noted progressive difficulty walking with tightness and numbness of her lower extremities.  Pt was seen and evaluated  by PMD/ neurologist s/p CT scan showed a thoracic lesion at T8 followed by MRI .  Pt denies any pain, no bowel or bladder symptoms. Presents to PST for scheduled  T 7- T 9 laminectomy  for tumor removal on 7/29/19.

## 2019-07-19 NOTE — H&P PST ADULT - ASSESSMENT
CAPRINI SCORE [CLOT updated 18]    AGE RELATED RISK FACTORS                                                       MOBILITY RELATED FACTORS  [ ] Age 41-60 years                                            (1 Point)                    [ ] Bed rest                                                        (1 Point)  [ ] Age: 61-74 years                                           (2 Points)                  [ ] Plaster cast                                                   (2 Points)  [ x] Age= 75 years                                              (3 Points)                    [ ] Bed bound for more than 72 hours                 (2 Points)    DISEASE RELATED RISK FACTORS                                               GENDER SPECIFIC FACTORS  [ ] Edema in the lower extremities                       (1 Point)              [ ] Pregnancy                                                     (1 Point)  [x ] Varicose veins                                               (1 Point)                     [ ] Post-partum < 6 weeks                                   (1 Point)             [ ] BMI > 25 Kg/m2                                            (1 Point)                     [ ] Hormonal therapy  or oral contraception          (1 Point)                 [ ] Sepsis (in the previous month)                        (1 Point)               [ ] History of pregnancy complications                 (1 point)  [ ] Pneumonia or serious lung disease                                               [ ] Unexplained or recurrent                     (1 Point)           (in the previous month)                               (1 Point)  [ ] Abnormal pulmonary function test                     (1 Point)                 SURGERY RELATED RISK FACTORS  [ ] Acute myocardial infarction                              (1 Point)               [ ]  Section                                             (1 Point)  [ ] Congestive heart failure (in the previous month)  (1 Point)      [ ] Minor surgery                                                  (1 Point)   [ ] Inflammatory bowel disease                             (1 Point)               [ ] Arthroscopic surgery                                        (2 Points)  [ ] Central venous access                                      (2 Points)                x[ ] General surgery lasting more than 45 minutes (2 points)  [ ] Present or previous malignancy                     (2 Points)                [ ] Elective arthroplasty                                         (5 points)    [ ] Stroke (in the previous month)                          (5 Points)                                                                                                                                                           HEMATOLOGY RELATED FACTORS                                                 TRAUMA RELATED RISK FACTORS  [ ] Prior episodes of VTE                                     (3 Points)                [ ] Fracture of the hip, pelvis, or leg                       (5 Points)  [ ] Positive family history for VTE                         (3 Points)             [ ] Acute spinal cord injury (in the previous month)  (5 Points)  [ ] Prothrombin 26136 A                                     (3 Points)               [ ] Paralysis  (less than 1 month)                             (5 Points)  [ ] Factor V Leiden                                             (3 Points)                  [ ] Multiple Trauma within 1 month                        (5 Points)  [ ] Lupus anticoagulants                                     (3 Points)                                                           [ ] Anticardiolipin antibodies                               (3 Points)                                                       [ ] High homocysteine in the blood                      (3 Points)                                             [ ] Other congenital or acquired thrombophilia      (3 Points)                                                [ ] Heparin induced thrombocytopenia                  (3 Points)                                     Total Score [     6     ]

## 2019-07-19 NOTE — H&P PST ADULT - NSICDXPASTSURGICALHX_GEN_ALL_CORE_FT
PAST SURGICAL HISTORY:  History of left hip replacement PAST SURGICAL HISTORY:  History of hemorrhoidectomy     History of left hip replacement 10 years ago

## 2019-07-20 LAB
MRSA PCR RESULT.: SIGNIFICANT CHANGE UP
S AUREUS DNA NOSE QL NAA+PROBE: SIGNIFICANT CHANGE UP

## 2019-07-24 ENCOUNTER — NON-APPOINTMENT (OUTPATIENT)
Age: 84
End: 2019-07-24

## 2019-07-24 ENCOUNTER — APPOINTMENT (OUTPATIENT)
Dept: PULMONOLOGY | Facility: CLINIC | Age: 84
End: 2019-07-24
Payer: MEDICARE

## 2019-07-24 VITALS
BODY MASS INDEX: 22.72 KG/M2 | OXYGEN SATURATION: 97 % | TEMPERATURE: 97.8 F | DIASTOLIC BLOOD PRESSURE: 72 MMHG | WEIGHT: 105 LBS | SYSTOLIC BLOOD PRESSURE: 142 MMHG | HEART RATE: 71 BPM

## 2019-07-24 PROCEDURE — 99215 OFFICE O/P EST HI 40 MIN: CPT | Mod: 25

## 2019-07-24 PROCEDURE — 93000 ELECTROCARDIOGRAM COMPLETE: CPT

## 2019-07-24 RX ORDER — GABAPENTIN 300 MG/1
300 CAPSULE ORAL
Qty: 30 | Refills: 1 | Status: DISCONTINUED | COMMUNITY
End: 2019-07-24

## 2019-07-24 NOTE — REVIEW OF SYSTEMS
[Hypertension] : ~T hypertension [Numbness] : numbness [Memory Loss] : ~T memory lapses or loss [Fever] : no fever [Fatigue] : no fatigue [Nasal Congestion] : no nasal congestion [Cough] : no cough [Epistaxis] : no nosebleeds [Dyspnea] : no dyspnea [Wheezing] : no wheezing [Chest Discomfort] : no chest discomfort [PND] : no PND [Palpitations] : no palpitations [Edema] : ~T edema was not present [Nasal Discharge] : no nasal discharge [Heartburn] : no heartburn [Indigestion] : no indigestion [Dysuria] : no dysuria [Back Pain] : ~T no back pain [Myalgias] : no myalgias [Arthralgias] : no arthralgias [Rash] : no [unfilled] rash [Headache] : no headache [Anemia] : no anemia [Easy Bruising] : no ~M tendency for easy bruising [Dizziness] : no dizziness [Syncope] : no fainting [Seizures] : no seizures [Paralysis] : no paralysis was seen [Focal Weakness] : no focal weakness [Involuntary Movements] : ~T no involuntary movements [Confusion] : no confusion [Head Injury] : no head injury [Tremor] : ~T no ~M tremor was seen [Anxiety] : no anxiety [Diabetes] : no diabetes mellitus [Thyroid Problem] : no thyroid problem [DVT] : no DVT [Pulmonary Embolism] : no pulmonary embolism [Difficulty Initiating Sleep] : no difficulty falling asleep

## 2019-07-24 NOTE — DISCUSSION/SUMMARY
[FreeTextEntry1] : She is a 89 year-old woman with a history of hypertension.  She has had no other medical problems and has been physically active until this time. She has continued to work. She has been complaining of numbness in her legs and feet. On contrast enhanced MRI done in June 2019 she was found to have a mass at T8. Surgical intervention is planned. \par \par Her overall medical condition is stable for her upcoming surgery.

## 2019-07-24 NOTE — PROCEDURE
[FreeTextEntry1] : An MRI with contrast from June 28, 2019 demonstrated a mass lesion at T8 level with compression on the spinal cord. The findings were felt to be consistent with a meningioma or a schwannoma.\par \par EKG 7/24/19: Regular sinus rhythm with poor R-wave progression.

## 2019-07-24 NOTE — HISTORY OF PRESENT ILLNESS
[FreeTextEntry1] : She is going to have surgery next week. She is feeling the same. She has numbness from the waist down. She is able to walk. No weakness.No other complaints. She is taking her medications as prescribed. She stopped the gabapentin which she said did not help her. \par

## 2019-07-24 NOTE — PHYSICAL EXAM
[General Appearance - In No Acute Distress] : no acute distress [Normal Oropharynx] : normal oropharynx [Neck Appearance] : the appearance of the neck was normal [Jugular Venous Distention Increased] : there was no jugular-venous distention [Auscultation Breath Sounds / Voice Sounds] : lungs were clear to auscultation bilaterally [Thyroid Diffuse Enlargement] : the thyroid was not enlarged [Heart Sounds] : normal S1 and S2 [Bowel Sounds] : normal bowel sounds [Abdomen Tenderness] : non-tender [Abdomen Soft] : soft [Abnormal Walk] : normal gait [Nail Clubbing] : no clubbing of the fingernails [Cyanosis, Localized] : no localized cyanosis [] : no rash [No Focal Deficits] : no focal deficits [Oriented To Time, Place, And Person] : oriented to person, place, and time [Impaired Insight] : insight and judgment were intact [Murmurs] : no murmurs present [Erythema] : no erythema of the pharynx [FreeTextEntry1] : Crepitations noted in the right shoulder with movement. Skin intact. No swelling. No redness.

## 2019-07-28 ENCOUNTER — TRANSCRIPTION ENCOUNTER (OUTPATIENT)
Age: 84
End: 2019-07-28

## 2019-07-28 NOTE — PRE-ANESTHESIA EVALUATION ADULT - NSANTHPMHFT_GEN_ALL_CORE
89 year old with c/o  instability of her gait and balance , She also noted progressive difficulty walking with tightness and numbness of her lower extremities.  Pt was seen and evaluated  by PMD/ neurologist s/p CT scan showed a thoracic lesion at T8 followed by MRI .  Pt denies any pain, no bowel or bladder symptoms. Presents to PST for scheduled  T 7- T 9 laminectomy  for tumor removal 89 year old with c/o  instability of her gait and balance , She also noted progressive difficulty walking with tightness and numbness of her lower extremities.  Pt was seen and evaluated  by PMD/ neurologist s/p CT scan showed a thoracic lesion at T8 followed by MRI .  Pt denies any pain, no bowel or bladder symptoms.

## 2019-07-28 NOTE — PRE-ANESTHESIA EVALUATION ADULT - NSANTHAIRWAYFT_ENT_ALL_CORE
Thyromental distance:  Interincisor distance:  Neck: Thyromental distance: >3FB  Interincisor distance:  >2cm  Neck: ROM with extension/flexion grossly in tact

## 2019-07-28 NOTE — PRE-ANESTHESIA EVALUATION ADULT - NSANTHPEFT_GEN_ALL_CORE
General: well appearing elderly female  Cardiovascular: RRR, no murmurs appreciated  Respiratory: CTA B/L

## 2019-07-28 NOTE — PRE-ANESTHESIA EVALUATION ADULT - NSANTHOSAYNRD_GEN_A_CORE
No. ERIC screening performed.  STOP BANG Legend: 0-2 = LOW Risk; 3-4 = INTERMEDIATE Risk; 5-8 = HIGH Risk

## 2019-07-28 NOTE — PRE-ANESTHESIA EVALUATION ADULT - NSDENTALSD_ENT_ALL_CORE
Partial Upper Dentures/caps/bridge/implants caps/bridge/implants/Partial Upper Dentures, no loose teeth

## 2019-07-29 ENCOUNTER — RESULT REVIEW (OUTPATIENT)
Age: 84
End: 2019-07-29

## 2019-07-29 ENCOUNTER — APPOINTMENT (OUTPATIENT)
Dept: SPINE | Facility: HOSPITAL | Age: 84
End: 2019-07-29
Payer: MEDICARE

## 2019-07-29 ENCOUNTER — INPATIENT (INPATIENT)
Facility: HOSPITAL | Age: 84
LOS: 4 days | Discharge: HOME | DRG: 29 | End: 2019-08-03
Attending: NEUROLOGICAL SURGERY | Admitting: NEUROLOGICAL SURGERY
Payer: MEDICARE

## 2019-07-29 VITALS
SYSTOLIC BLOOD PRESSURE: 143 MMHG | DIASTOLIC BLOOD PRESSURE: 77 MMHG | OXYGEN SATURATION: 96 % | TEMPERATURE: 98 F | HEART RATE: 73 BPM | WEIGHT: 104.94 LBS | RESPIRATION RATE: 16 BRPM | HEIGHT: 60 IN

## 2019-07-29 DIAGNOSIS — Z96.642 PRESENCE OF LEFT ARTIFICIAL HIP JOINT: Chronic | ICD-10-CM

## 2019-07-29 DIAGNOSIS — S24.113A COMPLETE LESION AT T7-T10 LEVEL OF THORACIC SPINAL CORD, INITIAL ENCOUNTER: ICD-10-CM

## 2019-07-29 DIAGNOSIS — Z98.890 OTHER SPECIFIED POSTPROCEDURAL STATES: Chronic | ICD-10-CM

## 2019-07-29 LAB
ANION GAP SERPL CALC-SCNC: 15 MMOL/L — SIGNIFICANT CHANGE UP (ref 5–17)
BASOPHILS # BLD AUTO: 0 K/UL — SIGNIFICANT CHANGE UP (ref 0–0.2)
BASOPHILS NFR BLD AUTO: 0.1 % — SIGNIFICANT CHANGE UP (ref 0–2)
BUN SERPL-MCNC: 27 MG/DL — HIGH (ref 7–23)
CALCIUM SERPL-MCNC: 8 MG/DL — LOW (ref 8.4–10.5)
CHLORIDE SERPL-SCNC: 102 MMOL/L — SIGNIFICANT CHANGE UP (ref 96–108)
CO2 SERPL-SCNC: 20 MMOL/L — LOW (ref 22–31)
CREAT SERPL-MCNC: 1.3 MG/DL — SIGNIFICANT CHANGE UP (ref 0.5–1.3)
EOSINOPHIL # BLD AUTO: 0 K/UL — SIGNIFICANT CHANGE UP (ref 0–0.5)
EOSINOPHIL NFR BLD AUTO: 0.2 % — SIGNIFICANT CHANGE UP (ref 0–6)
GLUCOSE BLDC GLUCOMTR-MCNC: 95 MG/DL — SIGNIFICANT CHANGE UP (ref 70–99)
GLUCOSE SERPL-MCNC: 147 MG/DL — HIGH (ref 70–99)
HCT VFR BLD CALC: 35.2 % — SIGNIFICANT CHANGE UP (ref 34.5–45)
HGB BLD-MCNC: 11.7 G/DL — SIGNIFICANT CHANGE UP (ref 11.5–15.5)
LYMPHOCYTES # BLD AUTO: 0.7 K/UL — LOW (ref 1–3.3)
LYMPHOCYTES # BLD AUTO: 7.8 % — LOW (ref 13–44)
MCHC RBC-ENTMCNC: 30.7 PG — SIGNIFICANT CHANGE UP (ref 27–34)
MCHC RBC-ENTMCNC: 33.4 GM/DL — SIGNIFICANT CHANGE UP (ref 32–36)
MCV RBC AUTO: 92.1 FL — SIGNIFICANT CHANGE UP (ref 80–100)
MONOCYTES # BLD AUTO: 0.1 K/UL — SIGNIFICANT CHANGE UP (ref 0–0.9)
MONOCYTES NFR BLD AUTO: 1.4 % — LOW (ref 2–14)
NEUTROPHILS # BLD AUTO: 7.7 K/UL — HIGH (ref 1.8–7.4)
NEUTROPHILS NFR BLD AUTO: 90.5 % — HIGH (ref 43–77)
PLATELET # BLD AUTO: 219 K/UL — SIGNIFICANT CHANGE UP (ref 150–400)
POTASSIUM SERPL-MCNC: 4.4 MMOL/L — SIGNIFICANT CHANGE UP (ref 3.5–5.3)
POTASSIUM SERPL-SCNC: 4.4 MMOL/L — SIGNIFICANT CHANGE UP (ref 3.5–5.3)
RBC # BLD: 3.82 M/UL — SIGNIFICANT CHANGE UP (ref 3.8–5.2)
RBC # FLD: 12.5 % — SIGNIFICANT CHANGE UP (ref 10.3–14.5)
RH IG SCN BLD-IMP: POSITIVE — SIGNIFICANT CHANGE UP
SODIUM SERPL-SCNC: 137 MMOL/L — SIGNIFICANT CHANGE UP (ref 135–145)
WBC # BLD: 8.5 K/UL — SIGNIFICANT CHANGE UP (ref 3.8–10.5)
WBC # FLD AUTO: 8.5 K/UL — SIGNIFICANT CHANGE UP (ref 3.8–10.5)

## 2019-07-29 PROCEDURE — 63281 BX/EXC IDRL SPINE LESN THRC: CPT

## 2019-07-29 PROCEDURE — 69990 MICROSURGERY ADD-ON: CPT

## 2019-07-29 PROCEDURE — 88342 IMHCHEM/IMCYTCHM 1ST ANTB: CPT | Mod: 26,59

## 2019-07-29 PROCEDURE — 88360 TUMOR IMMUNOHISTOCHEM/MANUAL: CPT | Mod: 26

## 2019-07-29 PROCEDURE — 88307 TISSUE EXAM BY PATHOLOGIST: CPT | Mod: 26

## 2019-07-29 RX ORDER — SODIUM CHLORIDE 9 MG/ML
1000 INJECTION INTRAMUSCULAR; INTRAVENOUS; SUBCUTANEOUS
Refills: 0 | Status: DISCONTINUED | OUTPATIENT
Start: 2019-07-29 | End: 2019-07-30

## 2019-07-29 RX ORDER — PANTOPRAZOLE SODIUM 20 MG/1
40 TABLET, DELAYED RELEASE ORAL
Refills: 0 | Status: DISCONTINUED | OUTPATIENT
Start: 2019-07-29 | End: 2019-08-03

## 2019-07-29 RX ORDER — OXYCODONE HYDROCHLORIDE 5 MG/1
10 TABLET ORAL EVERY 6 HOURS
Refills: 0 | Status: DISCONTINUED | OUTPATIENT
Start: 2019-07-29 | End: 2019-08-03

## 2019-07-29 RX ORDER — ASCORBIC ACID 60 MG
500 TABLET,CHEWABLE ORAL
Refills: 0 | Status: DISCONTINUED | OUTPATIENT
Start: 2019-07-29 | End: 2019-08-03

## 2019-07-29 RX ORDER — FOLIC ACID 0.8 MG
1 TABLET ORAL DAILY
Refills: 0 | Status: DISCONTINUED | OUTPATIENT
Start: 2019-07-29 | End: 2019-08-03

## 2019-07-29 RX ORDER — FENTANYL CITRATE 50 UG/ML
25 INJECTION INTRAVENOUS
Refills: 0 | Status: DISCONTINUED | OUTPATIENT
Start: 2019-07-29 | End: 2019-07-29

## 2019-07-29 RX ORDER — CHLORHEXIDINE GLUCONATE 213 G/1000ML
1 SOLUTION TOPICAL DAILY
Refills: 0 | Status: DISCONTINUED | OUTPATIENT
Start: 2019-07-29 | End: 2019-07-29

## 2019-07-29 RX ORDER — ONDANSETRON 8 MG/1
4 TABLET, FILM COATED ORAL ONCE
Refills: 0 | Status: COMPLETED | OUTPATIENT
Start: 2019-07-29 | End: 2019-07-29

## 2019-07-29 RX ORDER — DEXAMETHASONE 0.5 MG/5ML
4 ELIXIR ORAL EVERY 6 HOURS
Refills: 0 | Status: DISCONTINUED | OUTPATIENT
Start: 2019-07-29 | End: 2019-07-31

## 2019-07-29 RX ORDER — ACETAMINOPHEN 500 MG
650 TABLET ORAL EVERY 6 HOURS
Refills: 0 | Status: DISCONTINUED | OUTPATIENT
Start: 2019-07-29 | End: 2019-08-03

## 2019-07-29 RX ORDER — PREGABALIN 225 MG/1
1000 CAPSULE ORAL DAILY
Refills: 0 | Status: DISCONTINUED | OUTPATIENT
Start: 2019-07-29 | End: 2019-08-03

## 2019-07-29 RX ORDER — HYDROMORPHONE HYDROCHLORIDE 2 MG/ML
1 INJECTION INTRAMUSCULAR; INTRAVENOUS; SUBCUTANEOUS EVERY 6 HOURS
Refills: 0 | Status: DISCONTINUED | OUTPATIENT
Start: 2019-07-29 | End: 2019-08-02

## 2019-07-29 RX ORDER — OXYCODONE HYDROCHLORIDE 5 MG/1
5 TABLET ORAL EVERY 6 HOURS
Refills: 0 | Status: DISCONTINUED | OUTPATIENT
Start: 2019-07-29 | End: 2019-08-03

## 2019-07-29 RX ORDER — SENNA PLUS 8.6 MG/1
2 TABLET ORAL AT BEDTIME
Refills: 0 | Status: DISCONTINUED | OUTPATIENT
Start: 2019-07-29 | End: 2019-08-03

## 2019-07-29 RX ORDER — VITAMIN E 100 UNIT
1 CAPSULE ORAL
Qty: 0 | Refills: 0 | DISCHARGE

## 2019-07-29 RX ORDER — CEFAZOLIN SODIUM 1 G
2000 VIAL (EA) INJECTION EVERY 8 HOURS
Refills: 0 | Status: COMPLETED | OUTPATIENT
Start: 2019-07-29 | End: 2019-07-30

## 2019-07-29 RX ORDER — HYDROMORPHONE HYDROCHLORIDE 2 MG/ML
0.25 INJECTION INTRAMUSCULAR; INTRAVENOUS; SUBCUTANEOUS ONCE
Refills: 0 | Status: DISCONTINUED | OUTPATIENT
Start: 2019-07-29 | End: 2019-07-29

## 2019-07-29 RX ORDER — SODIUM CHLORIDE 9 MG/ML
3 INJECTION INTRAMUSCULAR; INTRAVENOUS; SUBCUTANEOUS EVERY 8 HOURS
Refills: 0 | Status: DISCONTINUED | OUTPATIENT
Start: 2019-07-29 | End: 2019-07-29

## 2019-07-29 RX ORDER — FENTANYL CITRATE 50 UG/ML
50 INJECTION INTRAVENOUS
Refills: 0 | Status: DISCONTINUED | OUTPATIENT
Start: 2019-07-29 | End: 2019-07-29

## 2019-07-29 RX ORDER — DOCUSATE SODIUM 100 MG
100 CAPSULE ORAL THREE TIMES A DAY
Refills: 0 | Status: DISCONTINUED | OUTPATIENT
Start: 2019-07-29 | End: 2019-08-03

## 2019-07-29 RX ORDER — LIDOCAINE HCL 20 MG/ML
0.2 VIAL (ML) INJECTION ONCE
Refills: 0 | Status: COMPLETED | OUTPATIENT
Start: 2019-07-29 | End: 2019-07-29

## 2019-07-29 RX ADMIN — HYDROMORPHONE HYDROCHLORIDE 0.25 MILLIGRAM(S): 2 INJECTION INTRAMUSCULAR; INTRAVENOUS; SUBCUTANEOUS at 12:15

## 2019-07-29 RX ADMIN — Medication 100 MILLIGRAM(S): at 14:43

## 2019-07-29 RX ADMIN — Medication 100 MILLIGRAM(S): at 17:13

## 2019-07-29 RX ADMIN — HYDROMORPHONE HYDROCHLORIDE 0.25 MILLIGRAM(S): 2 INJECTION INTRAMUSCULAR; INTRAVENOUS; SUBCUTANEOUS at 12:00

## 2019-07-29 RX ADMIN — Medication 650 MILLIGRAM(S): at 22:15

## 2019-07-29 RX ADMIN — SODIUM CHLORIDE 3 MILLILITER(S): 9 INJECTION INTRAMUSCULAR; INTRAVENOUS; SUBCUTANEOUS at 06:19

## 2019-07-29 RX ADMIN — ONDANSETRON 4 MILLIGRAM(S): 8 TABLET, FILM COATED ORAL at 17:12

## 2019-07-29 RX ADMIN — Medication 4 MILLIGRAM(S): at 16:24

## 2019-07-29 RX ADMIN — FENTANYL CITRATE 25 MICROGRAM(S): 50 INJECTION INTRAVENOUS at 11:15

## 2019-07-29 RX ADMIN — Medication 100 MILLIGRAM(S): at 21:44

## 2019-07-29 RX ADMIN — Medication 650 MILLIGRAM(S): at 21:45

## 2019-07-29 RX ADMIN — SODIUM CHLORIDE 75 MILLILITER(S): 9 INJECTION INTRAMUSCULAR; INTRAVENOUS; SUBCUTANEOUS at 15:44

## 2019-07-29 RX ADMIN — Medication 0.2 MILLILITER(S): at 06:18

## 2019-07-29 RX ADMIN — CHLORHEXIDINE GLUCONATE 1 APPLICATION(S): 213 SOLUTION TOPICAL at 06:19

## 2019-07-29 RX ADMIN — Medication 4 MILLIGRAM(S): at 21:44

## 2019-07-29 RX ADMIN — SENNA PLUS 2 TABLET(S): 8.6 TABLET ORAL at 21:49

## 2019-07-29 RX ADMIN — FENTANYL CITRATE 25 MICROGRAM(S): 50 INJECTION INTRAVENOUS at 11:30

## 2019-07-29 NOTE — PROGRESS NOTE ADULT - SUBJECTIVE AND OBJECTIVE BOX
Patient seen and examined at bedside.    --Anticoagulation--    T(C): 36 (07-29-19 @ 11:05), Max: 36.7 (07-29-19 @ 05:46)  HR: 63 (07-29-19 @ 15:00) (61 - 73)  BP: 161/74 (07-29-19 @ 15:00) (132/58 - 162/71)  RR: 16 (07-29-19 @ 15:00) (16 - 18)  SpO2: 94% (07-29-19 @ 15:00) (94% - 100%)  Wt(kg): --    Exam:  Exam:  AOx3, Following Commands  Motor:          Upper extremity                       Delt      Bicep     Tricep     HG                                                 L         5/5        5/5          5/5        5/5                                               R          5/5       5/5          5/5        5/5          Lower extremity                           HF          KF         KE         DF        PF                                                  L           5/5         5/5        5/5       5/5        5/5                                               R           5/5         5/5        5/5       5/5        5/5  Sensation / Reflexes  [ x] intact to light touch  [ ] decreased:   No clonus

## 2019-07-29 NOTE — PATIENT PROFILE ADULT - FAMILY NEEDS
no Cheiloplasty (Less Than 50%) Text: A decision was made to reconstruct the defect with a  cheiloplasty.  The defect was undermined extensively.  Additional obicularis oris muscle was excised with a 15 blade scalpel.  The defect was converted into a full thickness wedge, of less than 50% of the vertical height of the lip, to facilite a better cosmetic result.  Small vessels were then tied off with 5-0 monocyrl. The obicularis oris, superficial fascia, adipose and dermis were then reapproximated.  After the deeper layers were approximated the epidermis was reapproximated with particular care given to realign the vermilion border.

## 2019-07-30 LAB
ALBUMIN SERPL ELPH-MCNC: 3.3 G/DL — SIGNIFICANT CHANGE UP (ref 3.3–5)
ANION GAP SERPL CALC-SCNC: 11 MMOL/L — SIGNIFICANT CHANGE UP (ref 5–17)
ANION GAP SERPL CALC-SCNC: 14 MMOL/L — SIGNIFICANT CHANGE UP (ref 5–17)
BASOPHILS # BLD AUTO: 0.01 K/UL — SIGNIFICANT CHANGE UP (ref 0–0.2)
BASOPHILS NFR BLD AUTO: 0.1 % — SIGNIFICANT CHANGE UP (ref 0–2)
BUN SERPL-MCNC: 27 MG/DL — HIGH (ref 7–23)
BUN SERPL-MCNC: 29 MG/DL — HIGH (ref 7–23)
CALCIUM SERPL-MCNC: 7.4 MG/DL — LOW (ref 8.4–10.5)
CALCIUM SERPL-MCNC: 8.3 MG/DL — LOW (ref 8.4–10.5)
CHLORIDE SERPL-SCNC: 104 MMOL/L — SIGNIFICANT CHANGE UP (ref 96–108)
CHLORIDE SERPL-SCNC: 105 MMOL/L — SIGNIFICANT CHANGE UP (ref 96–108)
CO2 SERPL-SCNC: 20 MMOL/L — LOW (ref 22–31)
CO2 SERPL-SCNC: 21 MMOL/L — LOW (ref 22–31)
CREAT SERPL-MCNC: 1.22 MG/DL — SIGNIFICANT CHANGE UP (ref 0.5–1.3)
CREAT SERPL-MCNC: 1.26 MG/DL — SIGNIFICANT CHANGE UP (ref 0.5–1.3)
EOSINOPHIL # BLD AUTO: 0 K/UL — SIGNIFICANT CHANGE UP (ref 0–0.5)
EOSINOPHIL NFR BLD AUTO: 0 % — SIGNIFICANT CHANGE UP (ref 0–6)
GLUCOSE SERPL-MCNC: 126 MG/DL — HIGH (ref 70–99)
GLUCOSE SERPL-MCNC: 159 MG/DL — HIGH (ref 70–99)
HCT VFR BLD CALC: 34 % — LOW (ref 34.5–45)
HGB BLD-MCNC: 10.4 G/DL — LOW (ref 11.5–15.5)
IMM GRANULOCYTES NFR BLD AUTO: 0.4 % — SIGNIFICANT CHANGE UP (ref 0–1.5)
LYMPHOCYTES # BLD AUTO: 0.79 K/UL — LOW (ref 1–3.3)
LYMPHOCYTES # BLD AUTO: 5.9 % — LOW (ref 13–44)
MCHC RBC-ENTMCNC: 28.7 PG — SIGNIFICANT CHANGE UP (ref 27–34)
MCHC RBC-ENTMCNC: 30.6 GM/DL — LOW (ref 32–36)
MCV RBC AUTO: 93.9 FL — SIGNIFICANT CHANGE UP (ref 80–100)
MONOCYTES # BLD AUTO: 0.35 K/UL — SIGNIFICANT CHANGE UP (ref 0–0.9)
MONOCYTES NFR BLD AUTO: 2.6 % — SIGNIFICANT CHANGE UP (ref 2–14)
NEUTROPHILS # BLD AUTO: 12.14 K/UL — HIGH (ref 1.8–7.4)
NEUTROPHILS NFR BLD AUTO: 91 % — HIGH (ref 43–77)
PLATELET # BLD AUTO: 210 K/UL — SIGNIFICANT CHANGE UP (ref 150–400)
POTASSIUM SERPL-MCNC: 4.6 MMOL/L — SIGNIFICANT CHANGE UP (ref 3.5–5.3)
POTASSIUM SERPL-MCNC: 5.1 MMOL/L — SIGNIFICANT CHANGE UP (ref 3.5–5.3)
POTASSIUM SERPL-SCNC: 4.6 MMOL/L — SIGNIFICANT CHANGE UP (ref 3.5–5.3)
POTASSIUM SERPL-SCNC: 5.1 MMOL/L — SIGNIFICANT CHANGE UP (ref 3.5–5.3)
RBC # BLD: 3.62 M/UL — LOW (ref 3.8–5.2)
RBC # FLD: 13.8 % — SIGNIFICANT CHANGE UP (ref 10.3–14.5)
SODIUM SERPL-SCNC: 136 MMOL/L — SIGNIFICANT CHANGE UP (ref 135–145)
SODIUM SERPL-SCNC: 139 MMOL/L — SIGNIFICANT CHANGE UP (ref 135–145)
WBC # BLD: 13.35 K/UL — HIGH (ref 3.8–10.5)
WBC # FLD AUTO: 13.35 K/UL — HIGH (ref 3.8–10.5)

## 2019-07-30 PROCEDURE — 93970 EXTREMITY STUDY: CPT | Mod: 26

## 2019-07-30 RX ORDER — ENOXAPARIN SODIUM 100 MG/ML
30 INJECTION SUBCUTANEOUS AT BEDTIME
Refills: 0 | Status: DISCONTINUED | OUTPATIENT
Start: 2019-07-30 | End: 2019-08-03

## 2019-07-30 RX ADMIN — OXYCODONE HYDROCHLORIDE 5 MILLIGRAM(S): 5 TABLET ORAL at 15:43

## 2019-07-30 RX ADMIN — OXYCODONE HYDROCHLORIDE 5 MILLIGRAM(S): 5 TABLET ORAL at 22:10

## 2019-07-30 RX ADMIN — OXYCODONE HYDROCHLORIDE 5 MILLIGRAM(S): 5 TABLET ORAL at 08:15

## 2019-07-30 RX ADMIN — Medication 500 MILLIGRAM(S): at 18:06

## 2019-07-30 RX ADMIN — Medication 4 MILLIGRAM(S): at 15:43

## 2019-07-30 RX ADMIN — OXYCODONE HYDROCHLORIDE 5 MILLIGRAM(S): 5 TABLET ORAL at 16:15

## 2019-07-30 RX ADMIN — Medication 100 MILLIGRAM(S): at 09:14

## 2019-07-30 RX ADMIN — Medication 500 MILLIGRAM(S): at 05:40

## 2019-07-30 RX ADMIN — PREGABALIN 1000 MICROGRAM(S): 225 CAPSULE ORAL at 11:42

## 2019-07-30 RX ADMIN — Medication 4 MILLIGRAM(S): at 05:40

## 2019-07-30 RX ADMIN — Medication 650 MILLIGRAM(S): at 12:12

## 2019-07-30 RX ADMIN — SENNA PLUS 2 TABLET(S): 8.6 TABLET ORAL at 22:03

## 2019-07-30 RX ADMIN — Medication 100 MILLIGRAM(S): at 05:40

## 2019-07-30 RX ADMIN — Medication 1 TABLET(S): at 11:42

## 2019-07-30 RX ADMIN — OXYCODONE HYDROCHLORIDE 5 MILLIGRAM(S): 5 TABLET ORAL at 22:45

## 2019-07-30 RX ADMIN — Medication 100 MILLIGRAM(S): at 22:03

## 2019-07-30 RX ADMIN — Medication 650 MILLIGRAM(S): at 11:42

## 2019-07-30 RX ADMIN — Medication 4 MILLIGRAM(S): at 22:03

## 2019-07-30 RX ADMIN — OXYCODONE HYDROCHLORIDE 5 MILLIGRAM(S): 5 TABLET ORAL at 07:44

## 2019-07-30 RX ADMIN — Medication 100 MILLIGRAM(S): at 01:46

## 2019-07-30 RX ADMIN — ENOXAPARIN SODIUM 30 MILLIGRAM(S): 100 INJECTION SUBCUTANEOUS at 22:04

## 2019-07-30 RX ADMIN — Medication 4 MILLIGRAM(S): at 09:14

## 2019-07-30 RX ADMIN — Medication 1 MILLIGRAM(S): at 11:42

## 2019-07-30 RX ADMIN — Medication 100 MILLIGRAM(S): at 13:34

## 2019-07-30 RX ADMIN — PANTOPRAZOLE SODIUM 40 MILLIGRAM(S): 20 TABLET, DELAYED RELEASE ORAL at 05:41

## 2019-07-30 NOTE — CHART NOTE - NSCHARTNOTEFT_GEN_A_CORE
CAPRINI SCORE [CLOT] Score on Admission for     AGE RELATED RISK FACTORS                                                          MOBILITY RELATED FACTORS  [ ] Age 41-60 years                                            (1 Point)                  [ ] Bed rest                                                        (1 Point)  [ ] Age: 61-74 years                                           (2 Points)                [ ] Plaster cast                                                   (2 Points)  [x] Age= 75 years                                              (3 Points)                  [ ] Bed bound for more than 72 hours                 (2 Points)    DISEASE RELATED RISK FACTORS                                                   GENDER SPECIFIC FACTORS  [ ] Edema in the lower extremities                       (1 Point)            [ ] Pregnancy                                                     (1 Point)  [ ] Varicose veins                                               (1 Point)                   [ ] Post-partum < 6 weeks                                   (1 Point)             [ ] BMI > 25 Kg/m2                                            (1 Point)                   [ ] Hormonal therapy  or oral contraception          (1 Point)                 [ ] Sepsis (in the previous month)                        (1 Point)             [ ] History of pregnancy complications                 (1 point)  [ ] Pneumonia or serious lung disease                                             [ ] Unexplained or recurrent                     (1 Point)           (in the previous month)                               (1 Point)  [ ] Abnormal pulmonary function test                     (1 Point)         SURGERY RELATED RISK FACTORS (include planned surgeries)  [ ] Acute myocardial infarction                              (1 Point)               [ ]  Section                                             (1 Point)  [ ] Congestive heart failure (in the previous month)  (1 Point)      [ ] Minor surgery                                                  (1 Point)   [ ] Inflammatory bowel disease                             (1 Point)               [ ] Arthroscopic surgery                                        (2 Points)  [ ] Central venous access                                      (2 Points)                [x] General surgery lasting more than 45 minutes   (2 Points)       [ ] Stroke (in the previous month)                          (5 Points)            [ ] Elective arthroplasty                                         (5 Points)            [ ] current or past malignancy                              (2 Points)                                                                                                       HEMATOLOGY RELATED FACTORS                                                    TRAUMA RELATED RISK FACTORS  [ ] Prior episodes of VTE                                     (3 Points)                [ ] Fracture of the hip, pelvis, or leg                       (5 Points)  [ ] Positive family history for VTE                         (3 Points)             [ ] Acute spinal cord injury (in the previous month)  (5 Points)  [ ] Prothrombin 15693 A                                     (3 Points)                [ ] Paralysis  (less than 1 month)                             (5 Points)  [ ] Factor V Leiden                                             (3 Points)                   [ ] Multiple Trauma within 1 month                        (5 Points)  [ ] Lupus anticoagulants                                     (3 Points)                                                           [ ] Anticardiolipin antibodies                               (3 Points)                                                       [ ] High homocysteine in the blood                      (3 Points)                                             [ ] Other congenital or acquired thrombophilia      (3 Points)                                                [ ] Heparin induced thrombocytopenia                  (3 Points)                                          Total Score [     5     ]    Risk:  Very low 0   Low 1 to 2   Moderate 3 to 4   High =5       VTE Prophylaxis Recommendations:  [x] mechanical pneumatic compression devices                                      [ ] contraindicated: _____________________  [x] chemo prophylaxis                                                                                   [ ] contraindicated _____________________    **** HIGH LIKELIHOOD DVT PRESENT ON ADMISSION  [x] (please order LE dopplers within 24 hours of admission)

## 2019-07-30 NOTE — PROGRESS NOTE ADULT - ASSESSMENT
HPI:  Patient is a 89 year old female with gait and balance instability with difficulty walking.  CT revealed T8 lesion.  Now presented for surgery.    PROCEDURE: s/p t7-t9 laminectomy for tumor resection on 7/30/2019   POD#1    PLAN:  -decadron 4q6  -oxycodone and dilaudid for pain control  -lovenox and SCDs for DVT prophylaxis  -senna and colace for bowel regimen  -LE duplex b/l to rule out DVT  -d/c resendiz, trial of void  -sodium and cholesterol restricted diet  -out of bed with assistance  -incentive spirometer for lung expansion  -pt - home pt with rolling walker upon discharge  -am labs    Spectra #[id number]                Assessment:  Please Check When Present   []  GCS  E   V  M     Heart Failure: []Acute, [] acute on chronic , []chronic  Heart Failure:  [] Diastolic (HFpEF), [] Systolic (HFrEF), []Combined (HFpEF and HFrEF), [] RHF, [] Pulm HTN, [] Other    [] SULY, [] ATN, [] AIN, [] other  [] CKD1, [] CKD2, [] CKD 3, [] CKD 4, [] CKD 5, []ESRD    Encephalopathy: [] Metabolic, [] Hepatic, [] toxic, [] Neurological, [] Other    Abnormal Nurtitional Status: [] malnurtition (see nutrition note), [ ]underweight: BMI < 19, [] morbid obesity: BMI >40, [] Cachexia    [] Sepsis  [] hypovolemic shock,[] cardiogenic shock, [] hemorrhagic shock, [] neuogenic shock  [] Acute Respiratory Failure  []Cerebral edema, [] Brain compression/ herniation,   [] Functional quadriplegia  [] Acute blood loss anemia

## 2019-07-30 NOTE — PROGRESS NOTE ADULT - SUBJECTIVE AND OBJECTIVE BOX
HPI:  Patient is a 89 year old female with gait and balance instability with difficulty walking.  CT revealed T8 lesion.  Now presented for surgery.    OVERNIGHT EVENTS:  No acute events overnight.  Patient feels well.  Having some mild incisional pain which is well controlled on current regimen.  Has been out of bed, worked with PT.  Tolerating diet.    Vital Signs Last 24 Hrs  T(C): 36.6 (30 Jul 2019 08:51), Max: 36.6 (30 Jul 2019 05:17)  T(F): 97.9 (30 Jul 2019 08:51), Max: 97.9 (30 Jul 2019 08:51)  HR: 56 (30 Jul 2019 08:51) (54 - 65)  BP: 122/54 (30 Jul 2019 09:36) (101/52 - 165/73)  BP(mean): 98 (29 Jul 2019 15:30) (83 - 109)  RR: 18 (30 Jul 2019 08:51) (15 - 18)  SpO2: 99% (30 Jul 2019 08:51) (94% - 100%)        PHYSICAL EXAM:  Neurological: awake, alert, oriented x3, follows commands, speech clear and fluent, moves all extremities x4 w/ 5/5 strength throughout    Cardiovascular: +s1, s2  Respiratory: clear to auscultation b/l  Gastrointestinal: soft, non-distended, non-tender  Genitourinary: +resendiz  Incision/Wound: posterior midline vertical incision c/d/i    TUBES/LINES:  [x] resendiz    DIET:  [x] sodium and cholesterol restricted    LABS:                        10.4   13.35 )-----------( 210      ( 30 Jul 2019 09:26 )             34.0     07-30    136  |  105  |  27<H>  ----------------------------<  126<H>  4.6   |  20<L>  |  1.22    Ca    7.4<L>      30 Jul 2019 07:06        Allergies    No Known Allergies          MEDICATIONS:  Antibiotics:    Neuro:  acetaminophen   Tablet .. 650 milliGRAM(s) Oral every 6 hours PRN  HYDROmorphone  Injectable 1 milliGRAM(s) SubCutaneous every 6 hours PRN  oxyCODONE    IR 5 milliGRAM(s) Oral every 6 hours PRN  oxyCODONE    IR 10 milliGRAM(s) Oral every 6 hours PRN    Anticoagulation:  Lovenox 30mg subcutaneous at bedtime    OTHER:  dexamethasone  Injectable 4 milliGRAM(s) IV Push every 6 hours  docusate sodium 100 milliGRAM(s) Oral three times a day  lidocaine 1% Injectable 0.2 milliLiter(s) Local Injection once  pantoprazole    Tablet 40 milliGRAM(s) Oral before breakfast  senna 2 Tablet(s) Oral at bedtime    IVF:  ascorbic acid 500 milliGRAM(s) Oral two times a day  cyanocobalamin 1000 MICROGram(s) Oral daily  folic acid 1 milliGRAM(s) Oral daily  multivitamin 1 Tablet(s) Oral daily  sodium chloride 0.9%. 1000 milliLiter(s) IV Continuous <Continuous>    CULTURES:  none    RADIOLOGY & ADDITIONAL TESTS:  no new imaging HPI:  Patient is a 89 year old female with gait and balance instability with difficulty walking.  CT revealed T8 lesion.  Now presented for surgery.    OVERNIGHT EVENTS:  No acute events overnight.  Patient feels well.  Having some mild incisional pain which is well controlled on current regimen.  Has been out of bed, worked with PT.  Tolerating diet.    Vital Signs Last 24 Hrs  T(C): 36.6 (30 Jul 2019 08:51), Max: 36.6 (30 Jul 2019 05:17)  T(F): 97.9 (30 Jul 2019 08:51), Max: 97.9 (30 Jul 2019 08:51)  HR: 56 (30 Jul 2019 08:51) (54 - 65)  BP: 122/54 (30 Jul 2019 09:36) (101/52 - 165/73)  BP(mean): 98 (29 Jul 2019 15:30) (83 - 109)  RR: 18 (30 Jul 2019 08:51) (15 - 18)  SpO2: 99% (30 Jul 2019 08:51) (94% - 100%)        PHYSICAL EXAM:  Neurological: awake, alert, oriented x3, follows commands, speech clear and fluent, moves all extremities x4 w/ 5/5 strength throughout    Cardiovascular: +s1, s2  Respiratory: clear to auscultation b/l  Gastrointestinal: soft, non-distended, non-tender  Genitourinary: +resendiz  Incision/Wound: posterior midline vertical incision dry and intact w/ steri strips, mild blood tinged at inferior aspect    TUBES/LINES:  [x] resendiz    DIET:  [x] sodium and cholesterol restricted    LABS:                        10.4   13.35 )-----------( 210      ( 30 Jul 2019 09:26 )             34.0     07-30    136  |  105  |  27<H>  ----------------------------<  126<H>  4.6   |  20<L>  |  1.22    Ca    7.4<L>      30 Jul 2019 07:06        Allergies    No Known Allergies          MEDICATIONS:  Antibiotics:    Neuro:  acetaminophen   Tablet .. 650 milliGRAM(s) Oral every 6 hours PRN  HYDROmorphone  Injectable 1 milliGRAM(s) SubCutaneous every 6 hours PRN  oxyCODONE    IR 5 milliGRAM(s) Oral every 6 hours PRN  oxyCODONE    IR 10 milliGRAM(s) Oral every 6 hours PRN    Anticoagulation:  Lovenox 30mg subcutaneous at bedtime    OTHER:  dexamethasone  Injectable 4 milliGRAM(s) IV Push every 6 hours  docusate sodium 100 milliGRAM(s) Oral three times a day  lidocaine 1% Injectable 0.2 milliLiter(s) Local Injection once  pantoprazole    Tablet 40 milliGRAM(s) Oral before breakfast  senna 2 Tablet(s) Oral at bedtime    IVF:  ascorbic acid 500 milliGRAM(s) Oral two times a day  cyanocobalamin 1000 MICROGram(s) Oral daily  folic acid 1 milliGRAM(s) Oral daily  multivitamin 1 Tablet(s) Oral daily  sodium chloride 0.9%. 1000 milliLiter(s) IV Continuous <Continuous>    CULTURES:  none    RADIOLOGY & ADDITIONAL TESTS:  no new imaging

## 2019-07-30 NOTE — PHYSICAL THERAPY INITIAL EVALUATION ADULT - PATIENT/FAMILY/SIGNIFICANT OTHER GOALS STATEMENT, PT EVAL
Pt is a 89 y.o. F /c instability of gait/balanace and progressive difficulty ambulating /c tightness/numbness of B/L LEs. PMH: HTN, OP, hx of R elbow fx, hx of hemorrhoidectomy, L LLOYD (2009).

## 2019-07-30 NOTE — PHYSICAL THERAPY INITIAL EVALUATION ADULT - ADDITIONAL COMMENTS
PTA pt was ind /c all ADLs. Pt lives alone in PH /c 3 steps to enter /c B/L HR. Pt has first floor set up, walk-in shower in down 10 steps. Pt has grab bars in shower and for toilet. 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 Pt has 2 sons each living 5min drive from her house. Pt owns rollator that she reports occasionally using outside the home.

## 2019-07-30 NOTE — PHYSICAL THERAPY INITIAL EVALUATION ADULT - PERTINENT HX OF CURRENT PROBLEM, REHAB EVAL
Pt received in bedside chair, NAD, VSS. A&Ox4, pleasant and agreeable to PT session. Pt is a 89 y.o. F /c c/o instability during ambulation ant reports tightness/numbness of LEs. Pt underwent T8-T9 laminectomy 2/2 to meningioma. PMH: HTN, OP, hx R elbow rx, hx of LLOYD (2009).

## 2019-07-31 ENCOUNTER — TRANSCRIPTION ENCOUNTER (OUTPATIENT)
Age: 84
End: 2019-07-31

## 2019-07-31 LAB
ANION GAP SERPL CALC-SCNC: 12 MMOL/L — SIGNIFICANT CHANGE UP (ref 5–17)
BUN SERPL-MCNC: 33 MG/DL — HIGH (ref 7–23)
CALCIUM SERPL-MCNC: 8.6 MG/DL — SIGNIFICANT CHANGE UP (ref 8.4–10.5)
CHLORIDE SERPL-SCNC: 103 MMOL/L — SIGNIFICANT CHANGE UP (ref 96–108)
CO2 SERPL-SCNC: 21 MMOL/L — LOW (ref 22–31)
CREAT SERPL-MCNC: 1.38 MG/DL — HIGH (ref 0.5–1.3)
GLUCOSE SERPL-MCNC: 109 MG/DL — HIGH (ref 70–99)
HCT VFR BLD CALC: 37.6 % — SIGNIFICANT CHANGE UP (ref 34.5–45)
HGB BLD-MCNC: 11.8 G/DL — SIGNIFICANT CHANGE UP (ref 11.5–15.5)
MCHC RBC-ENTMCNC: 29.5 PG — SIGNIFICANT CHANGE UP (ref 27–34)
MCHC RBC-ENTMCNC: 31.3 GM/DL — LOW (ref 32–36)
MCV RBC AUTO: 94.3 FL — SIGNIFICANT CHANGE UP (ref 80–100)
PLATELET # BLD AUTO: 241 K/UL — SIGNIFICANT CHANGE UP (ref 150–400)
POTASSIUM SERPL-MCNC: 4.9 MMOL/L — SIGNIFICANT CHANGE UP (ref 3.5–5.3)
POTASSIUM SERPL-SCNC: 4.9 MMOL/L — SIGNIFICANT CHANGE UP (ref 3.5–5.3)
RBC # BLD: 3.99 M/UL — SIGNIFICANT CHANGE UP (ref 3.8–5.2)
RBC # FLD: 12.8 % — SIGNIFICANT CHANGE UP (ref 10.3–14.5)
SODIUM SERPL-SCNC: 136 MMOL/L — SIGNIFICANT CHANGE UP (ref 135–145)
WBC # BLD: 16.3 K/UL — HIGH (ref 3.8–10.5)
WBC # FLD AUTO: 16.3 K/UL — HIGH (ref 3.8–10.5)

## 2019-07-31 RX ORDER — SODIUM CHLORIDE 9 MG/ML
250 INJECTION INTRAMUSCULAR; INTRAVENOUS; SUBCUTANEOUS ONCE
Refills: 0 | Status: COMPLETED | OUTPATIENT
Start: 2019-07-31 | End: 2019-07-31

## 2019-07-31 RX ORDER — DEXAMETHASONE 0.5 MG/5ML
3 ELIXIR ORAL EVERY 8 HOURS
Refills: 0 | Status: DISCONTINUED | OUTPATIENT
Start: 2019-07-31 | End: 2019-08-02

## 2019-07-31 RX ADMIN — Medication 100 MILLIGRAM(S): at 21:51

## 2019-07-31 RX ADMIN — Medication 1 TABLET(S): at 12:35

## 2019-07-31 RX ADMIN — Medication 1 MILLIGRAM(S): at 12:35

## 2019-07-31 RX ADMIN — Medication 3 MILLIGRAM(S): at 21:51

## 2019-07-31 RX ADMIN — Medication 3 MILLIGRAM(S): at 14:25

## 2019-07-31 RX ADMIN — Medication 4 MILLIGRAM(S): at 06:10

## 2019-07-31 RX ADMIN — Medication 500 MILLIGRAM(S): at 06:11

## 2019-07-31 RX ADMIN — Medication 650 MILLIGRAM(S): at 17:35

## 2019-07-31 RX ADMIN — PANTOPRAZOLE SODIUM 40 MILLIGRAM(S): 20 TABLET, DELAYED RELEASE ORAL at 06:11

## 2019-07-31 RX ADMIN — SENNA PLUS 2 TABLET(S): 8.6 TABLET ORAL at 21:51

## 2019-07-31 RX ADMIN — OXYCODONE HYDROCHLORIDE 5 MILLIGRAM(S): 5 TABLET ORAL at 10:10

## 2019-07-31 RX ADMIN — Medication 100 MILLIGRAM(S): at 06:11

## 2019-07-31 RX ADMIN — Medication 100 MILLIGRAM(S): at 14:25

## 2019-07-31 RX ADMIN — SODIUM CHLORIDE 250 MILLILITER(S): 9 INJECTION INTRAMUSCULAR; INTRAVENOUS; SUBCUTANEOUS at 11:14

## 2019-07-31 RX ADMIN — PREGABALIN 1000 MICROGRAM(S): 225 CAPSULE ORAL at 12:35

## 2019-07-31 RX ADMIN — ENOXAPARIN SODIUM 30 MILLIGRAM(S): 100 INJECTION SUBCUTANEOUS at 21:51

## 2019-07-31 RX ADMIN — Medication 4 MILLIGRAM(S): at 09:38

## 2019-07-31 RX ADMIN — Medication 650 MILLIGRAM(S): at 18:05

## 2019-07-31 RX ADMIN — OXYCODONE HYDROCHLORIDE 5 MILLIGRAM(S): 5 TABLET ORAL at 09:38

## 2019-07-31 RX ADMIN — Medication 500 MILLIGRAM(S): at 17:35

## 2019-07-31 NOTE — PROGRESS NOTE ADULT - ASSESSMENT
89 year old with c/o  instability of her gait and balance , She also noted progressive difficulty walking with tightness and numbness of her lower extremities.  Pt was seen and evaluated  by PMD/ neurologist s/p CT scan showed a thoracic lesion at T8 followed by MRI .  Pt denies any pain, no bowel or bladder symptoms. Presents to PST for scheduled  T 7- T 9 laminectomy  for tumor removal on 7/29/19. (19 Jul 2019 13:52)    PROCEDURE: Adm 7/29 T7-9 Lami Rsxn Tumor      POD#2    PLAN:  Neuro: Path-P FU, Decrease Decadron to 3mg Q8h.  Inc activity/OOB. Crt increasing-FU AM.  Anemia improving. Leukocytosis from steroids. DC Home when pt son will be home Friday if medically clear.  Case d/w  this AM-said pt will have to private hire home care as not feasable to stay in hosp till Friday.    Respiratory: Patient instructed to use incentive spirometer [ X] YES [ ] NO              DVT ppx: [X ] SQL [ ] SQH and Venodynes [ ] Left [ ] Right [ X] Bilateral    Discharge Planning:  The patient was evaluated by PT and recommended home PT w/RW.       Assessment:  Please Check When Present   []  GCS  E   V  M     Heart Failure: []Acute, [] acute on chronic , []chronic  Heart Failure:  [] Diastolic (HFpEF), [] Systolic (HFrEF), []Combined (HFpEF and HFrEF), [] RHF, [] Pulm HTN, [] Other    [X] SULY, [] ATN, [] AIN, [] other  [] CKD1, [] CKD2, [] CKD 3, [] CKD 4, [] CKD 5, []ESRD    Encephalopathy: [] Metabolic, [] Hepatic, [] toxic, [] Neurological, [] Other    Abnormal Nurtitional Status: [] malnurtition (see nutrition note), [ ]underweight: BMI < 19, [] morbid obesity: BMI >40, [] Cachexia    [] Sepsis  [] hypovolemic shock,[] cardiogenic shock, [] hemorrhagic shock, [] neuogenic shock  [] Acute Respiratory Failure  []Cerebral edema, [] Brain compression/ herniation,   [] Functional quadriplegia  [] Acute blood loss anemia

## 2019-07-31 NOTE — DISCHARGE NOTE NURSING/CASE MANAGEMENT/SOCIAL WORK - NSDCDPATPORTLINK_GEN_ALL_CORE
You can access the [facility], offered by [facility], by registering with the following website: [email or web address]

## 2019-07-31 NOTE — PROGRESS NOTE ADULT - SUBJECTIVE AND OBJECTIVE BOX
SUBJECTIVE: In chair doing AM self care.  Just c/o general soarness.  Wants to go home Friday-as no one at home and still not very mobile.    OVERNIGHT EVENTS: None    Vital Signs Last 24 Hrs  T(C): 36.6 (31 Jul 2019 07:54), Max: 36.8 (30 Jul 2019 16:09)  T(F): 97.8 (31 Jul 2019 07:54), Max: 98.3 (30 Jul 2019 16:09)  HR: 58 (31 Jul 2019 07:54) (54 - 62)  BP: 127/58 (31 Jul 2019 07:54) (117/53 - 135/66)  BP(mean): --  RR: 18 (31 Jul 2019 07:54) (17 - 18)  SpO2: 95% (31 Jul 2019 07:54) (95% - 98%)  IVF: [ X] IVL [ ] NS+K@   DIET: [X ] Regular-DASH [ ] CCD [ ] Renal [ ] Puree [ ] Dysphagia [ ] Tube Feeds:   PCA: [ ] YES [ X] NO   GARDINER: [ ] YES [X ] NO [X ] VOID   BM: [ ] YES [X ] NO     DRAINS: None    PHYSICAL EXAM:    Constitutional: No Acute Distress     Neurological: AOx3, Following Commands, Moving all Extremities     Motor exam:          Upper extremity                         Delt     Bicep     Tricep    HG                                                 R         5/5        5/5        5/5       5/5                                               L          5/5        5/5        5/5       5/5          Lower extremity                        HF         KF        KE       DF         PF                                                  R        5/5        5/5        5/5       5/5         5/5                                               L         5/5        5/5       5/5       5/5          5/5                                                 Sensation: [X] intact to light touch  [] decreased:     Pulmonary: Clear to Auscultation, No rales, No rhonchi, No wheezes     Cardiovascular: S1, S2, Regular rate and rhythm     Gastrointestinal: Soft, Non-tender, Non-distended     Extremities: No calf tenderness     Incision: Dsg CDI. Flat    LABS:                        11.8   16.3  )-----------( 241      ( 31 Jul 2019 07:04 )             37.6    07-31    136  |  103  |  33<H>  ----------------------------<  109<H>  4.9   |  21<L>  |  1.38<H>    Ca    8.6      31 Jul 2019 07:02    TPro  x   /  Alb  3.3  /  TBili  x   /  DBili  x   /  AST  x   /  ALT  x   /  AlkPhos  x   07-30    IMAGING:  < from: [facility] Duplex Lower Ext Vein Scan, Bilat (07.30.19 @ 23:21) >  No evidence of deep venous thrombosis in either lower extremity.    MEDICATIONS  (STANDING):  ascorbic acid 500 milliGRAM(s) Oral two times a day  cyanocobalamin 1000 MICROGram(s) Oral daily  docusate sodium 100 milliGRAM(s) Oral three times a day  enoxaparin Injectable 30 milliGRAM(s) SubCutaneous at bedtime  folic acid 1 milliGRAM(s) Oral daily  lidocaine 1% Injectable 0.2 milliLiter(s) Local Injection once  multivitamin 1 Tablet(s) Oral daily  pantoprazole    Tablet 40 milliGRAM(s) Oral before breakfast  senna 2 Tablet(s) Oral at bedtime    MEDICATIONS  (PRN):  acetaminophen   Tablet .. 650 milliGRAM(s) Oral every 6 hours PRN Temp greater or equal to 38C (100.4F), Mild Pain (1 - 3)  HYDROmorphone  Injectable 1 milliGRAM(s) SubCutaneous every 6 hours PRN breakthorugh pain  oxyCODONE    IR 5 milliGRAM(s) Oral every 6 hours PRN Moderate Pain (4 - 6)  oxyCODONE    IR 10 milliGRAM(s) Oral every 6 hours PRN Severe Pain (7 - 10)

## 2019-08-01 DIAGNOSIS — I10 ESSENTIAL (PRIMARY) HYPERTENSION: ICD-10-CM

## 2019-08-01 DIAGNOSIS — R79.89 OTHER SPECIFIED ABNORMAL FINDINGS OF BLOOD CHEMISTRY: ICD-10-CM

## 2019-08-01 DIAGNOSIS — D32.1 BENIGN NEOPLASM OF SPINAL MENINGES: ICD-10-CM

## 2019-08-01 LAB
ANION GAP SERPL CALC-SCNC: 10 MMOL/L — SIGNIFICANT CHANGE UP (ref 5–17)
BUN SERPL-MCNC: 36 MG/DL — HIGH (ref 7–23)
CALCIUM SERPL-MCNC: 8.5 MG/DL — SIGNIFICANT CHANGE UP (ref 8.4–10.5)
CHLORIDE SERPL-SCNC: 107 MMOL/L — SIGNIFICANT CHANGE UP (ref 96–108)
CO2 SERPL-SCNC: 23 MMOL/L — SIGNIFICANT CHANGE UP (ref 22–31)
CREAT SERPL-MCNC: 1.33 MG/DL — HIGH (ref 0.5–1.3)
GLUCOSE SERPL-MCNC: 99 MG/DL — SIGNIFICANT CHANGE UP (ref 70–99)
POTASSIUM SERPL-MCNC: 5 MMOL/L — SIGNIFICANT CHANGE UP (ref 3.5–5.3)
POTASSIUM SERPL-SCNC: 5 MMOL/L — SIGNIFICANT CHANGE UP (ref 3.5–5.3)
SODIUM SERPL-SCNC: 140 MMOL/L — SIGNIFICANT CHANGE UP (ref 135–145)

## 2019-08-01 PROCEDURE — 99223 1ST HOSP IP/OBS HIGH 75: CPT

## 2019-08-01 RX ADMIN — Medication 650 MILLIGRAM(S): at 05:36

## 2019-08-01 RX ADMIN — Medication 3 MILLIGRAM(S): at 21:02

## 2019-08-01 RX ADMIN — OXYCODONE HYDROCHLORIDE 5 MILLIGRAM(S): 5 TABLET ORAL at 12:07

## 2019-08-01 RX ADMIN — PANTOPRAZOLE SODIUM 40 MILLIGRAM(S): 20 TABLET, DELAYED RELEASE ORAL at 05:06

## 2019-08-01 RX ADMIN — Medication 100 MILLIGRAM(S): at 05:06

## 2019-08-01 RX ADMIN — Medication 3 MILLIGRAM(S): at 14:59

## 2019-08-01 RX ADMIN — SENNA PLUS 2 TABLET(S): 8.6 TABLET ORAL at 21:02

## 2019-08-01 RX ADMIN — Medication 500 MILLIGRAM(S): at 17:17

## 2019-08-01 RX ADMIN — Medication 650 MILLIGRAM(S): at 05:06

## 2019-08-01 RX ADMIN — Medication 100 MILLIGRAM(S): at 21:02

## 2019-08-01 RX ADMIN — Medication 650 MILLIGRAM(S): at 17:18

## 2019-08-01 RX ADMIN — ENOXAPARIN SODIUM 30 MILLIGRAM(S): 100 INJECTION SUBCUTANEOUS at 21:02

## 2019-08-01 RX ADMIN — Medication 100 MILLIGRAM(S): at 14:59

## 2019-08-01 RX ADMIN — Medication 500 MILLIGRAM(S): at 05:06

## 2019-08-01 RX ADMIN — Medication 1 MILLIGRAM(S): at 12:06

## 2019-08-01 RX ADMIN — OXYCODONE HYDROCHLORIDE 5 MILLIGRAM(S): 5 TABLET ORAL at 12:40

## 2019-08-01 RX ADMIN — PREGABALIN 1000 MICROGRAM(S): 225 CAPSULE ORAL at 12:06

## 2019-08-01 RX ADMIN — Medication 1 TABLET(S): at 12:06

## 2019-08-01 RX ADMIN — Medication 3 MILLIGRAM(S): at 05:06

## 2019-08-01 RX ADMIN — Medication 650 MILLIGRAM(S): at 17:50

## 2019-08-01 NOTE — CONSULT NOTE ADULT - PROBLEM SELECTOR RECOMMENDATION 3
BP ok off ARB  could use Norvasc if needed but would prefer resuming ARB as outpatient when Cr improves to baseline

## 2019-08-01 NOTE — CONSULT NOTE ADULT - SUBJECTIVE AND OBJECTIVE BOX
Authored by Dr Abdirashid Aranda [phone]   After hours or if no response please page [phone]    PMD: Leroy Gandara    Patient is a 89y old  Female who presents with a chief complaint of 'they removing a tumor in my spine' (19 Jul 2019 13:52)      HPI:  89 year old with c/o  instability of her gait and balance , She also noted progressive difficulty walking with tightness and numbness of her lower extremities.  Pt was seen and evaluated  by PMD/ neurologist s/p CT scan showed a thoracic lesion at T8 followed by MRI .  Pt denies any pain, no bowel or bladder symptoms. Now s/p scheduled  T 7- T 9 laminectomy  for tumor removal on 7/29/19. (19 Jul 2019 13:52) EBL 50 ml. meningioma on path.   Post op course unremarkable - monitored on floor. Noted elevated cr.       History limited due to: [ ] Dementia  [ ] Delirium  [ ] Condition    Pain Symptoms if applicable:             	                         none	   mild         moderate         severe  Pain:	                            0	    1-3	     4-6	         7-10  Location:	  Modifying factors:	  Associated symptoms:	    Function: [ ] Independent  [ ] Assistance  [ ] Total care  [ ] Non-ambulatory    Allergies    No Known Allergies    Intolerances        HOME MEDICATIONS: [ x] Reviewed   Home Medications:  losartan 100 mg oral tablet: 1 tab(s) orally once a day (29 Jul 2019 05:40)  Vitamin B-12 1000 mcg oral tablet: 1 tab(s) orally once a day (29 Jul 2019 05:40)  Vitamin D3 1000 intl units oral capsule: 1 cap(s) orally once a day (29 Jul 2019 05:40)      MEDICATIONS  (STANDING):  ascorbic acid 500 milliGRAM(s) Oral two times a day  cyanocobalamin 1000 MICROGram(s) Oral daily  dexamethasone     Tablet 3 milliGRAM(s) Oral every 8 hours  docusate sodium 100 milliGRAM(s) Oral three times a day  enoxaparin Injectable 30 milliGRAM(s) SubCutaneous at bedtime  folic acid 1 milliGRAM(s) Oral daily  lidocaine 1% Injectable 0.2 milliLiter(s) Local Injection once  multivitamin 1 Tablet(s) Oral daily  pantoprazole    Tablet 40 milliGRAM(s) Oral before breakfast  senna 2 Tablet(s) Oral at bedtime    MEDICATIONS  (PRN):  acetaminophen   Tablet .. 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  Elbow fracture, right: history -  HTN (hypertension)  History of hemorrhoidectomy  History of left hip replacement: 10 years ago  [x ] Reviewed     SOCIAL HISTORY:  Residence: [ ] [facility]  [ ] [facility]  [ ] Community  [ ] Substance abuse:   [ ] Tobacco: never  [ ] Alcohol use:     FAMILY HISTORY:  [x ] No pertinent family history in first degree relatives     REVIEW OF SYSTEMS:    CONSTITUTIONAL: No fever, weight loss, or fatigue  EYES: No eye pain, visual disturbances, or discharge  ENMT:  No difficulty hearing, tinnitus, vertigo; No sinus or throat pain  NECK: No pain or stiffness  BREASTS: No pain, masses, or nipple discharge  RESPIRATORY: No cough, wheezing, chills or hemoptysis; No shortness of breath  CARDIOVASCULAR: No chest pain, palpitations, dizziness, or leg swelling  GASTROINTESTINAL: No abdominal or epigastric pain. No nausea, vomiting, or hematemesis; No diarrhea or constipation. No melena or hematochezia.  GENITOURINARY: No dysuria, frequency, hematuria, or incontinence  NEUROLOGICAL: No headaches, memory loss, loss of strength, numbness, or tremors  SKIN: No itching, burning, rashes, or lesions   LYMPH NODES: No enlarged glands  ENDOCRINE: No heat or cold intolerance; No hair loss  MUSCULOSKELETAL: No muscle or back pain  PSYCHIATRIC: No depression, anxiety, mood swings, or difficulty sleeping  HEME/LYMPH: No easy bruising, or bleeding gums  ALLERGY AND IMMUNOLOGIC: No hives or eczema    [  ] All other ROS negative  [  ] Unable to obtain due to poor mental status    Vital Signs Last 24 Hrs  T(C): 36.7 (01 Aug 2019 14:42), Max: 36.8 (31 Jul 2019 16:15)  T(F): 98.1 (01 Aug 2019 14:42), Max: 98.3 (31 Jul 2019 16:15)  HR: 56 (01 Aug 2019 14:42) (51 - 90)  BP: 146/63 (01 Aug 2019 14:42) (113/61 - 146/63)  BP(mean): --  RR: 18 (01 Aug 2019 14:42) (17 - 18)  SpO2: 96% (01 Aug 2019 14:42) (93% - 97%)    PHYSICAL EXAM:    GENERAL: NAD, well-groomed, well-developed  HEAD:  Atraumatic, Normocephalic  EYES: EOMI, PERRLA, conjunctiva and sclera clear  ENMT: Moist mucous membranes  NECK: Supple, No JVD  RESPIRATORY: Clear to auscultation bilaterally; No rales, rhonchi, wheezing, or rubs  CARDIOVASCULAR: Regular rate and rhythm; No murmurs, rubs, or gallops  GASTROINTESTINAL: Soft, Nontender, Nondistended; Bowel sounds present  EXTREMITIES:  2+ Peripheral Pulses, No clubbing, cyanosis, or edema  NEURO:  Alert & Oriented X3; Moving all 4 extremities; No gross sensory deficits  HEME/LYMPH: No lymphadenopathy noted  SKIN: No rashes or lesions; Incisions C/D/I    LABS:                        11.8   16.3  )-----------( 241      ( 31 Jul 2019 07:04 )             37.6     08-01    140  |  107  |  36<H>  ----------------------------<  99  5.0   |  23  |  1.33<H>    Ca    8.5      01 Aug 2019 07:21    TPro  x   /  Alb  3.3  /  TBili  x   /  DBili  x   /  AST  x   /  ALT  x   /  AlkPhos  x   07-30        CAPILLARY BLOOD GLUCOSE            RADIOLOGY & ADDITIONAL STUDIES:    EKG:   Personally Reviewed:  [ ] YES - reviewed PMD documentation of prior ekg - NSR w/poor R wave progression    Imaging:   Personally Reviewed:  [x ] YES < from: [facility] Duplex Lower Ext Vein Scan, Bilat (07.30.19 @ 23:21) >    No evidence of deep venous thrombosis in either lower extremity.    < end of copied text >                Consultant(s) notes reviewed:    Care Discussed with Consultant(s)/Other Providers:    Advanced Directives: [ ] DNR  [ ] No feeding tube  [ ] MOLST in chart  [ ] MOLST completed today  [ ] Unknown    [x ] Probable osteoporosis  [ ] Possible osteomalacia  [ ] Increased delirium risk  [ ] Delirium and other risks can be reduced by:          -early ambulation          -minimizing "tethers" - IV, oxygen, catheters, etc          -avoiding hypnotics and sedatives          -maintaining hydration/nutrition          -avoid anticholinergics - diphenhydramine, etc          -pain control          -supportive environment Authored by Dr Abdirashid Aranda [phone]   After hours or if no response please page [phone]    PMD: Leroy Gandara    Patient is a 89y old  Female who presents with a chief complaint of 'they removing a tumor in my spine' (19 Jul 2019 13:52)    HPI:  89 year old with c/o  instability of her gait and balance , She also noted progressive difficulty walking with tightness and numbness of her lower extremities.  Pt was seen and evaluated  by PMD/ neurologist s/p CT scan showed a thoracic lesion at T8 followed by MRI .  Pt denies any pain, no bowel or bladder symptoms. Now s/p scheduled  T 7- T 9 laminectomy  for tumor removal on 7/29/19. (19 Jul 2019 13:52) EBL 50 ml. meningioma on path.   Post op course unremarkable - monitored on floor. Noted elevated cr.   pt otherwise feels well  lives alone      Function: [x ] Independent  [ ] Assistance  [ ] Total care  [ ] Non-ambulatory    Allergies    No Known Allergies    Intolerances        HOME MEDICATIONS: [ x] Reviewed   Home Medications:  losartan 100 mg oral tablet: 1 tab(s) orally once a day (29 Jul 2019 05:40)  Vitamin B-12 1000 mcg oral tablet: 1 tab(s) orally once a day (29 Jul 2019 05:40)  Vitamin D3 1000 intl units oral capsule: 1 cap(s) orally once a day (29 Jul 2019 05:40)      MEDICATIONS  (STANDING):  ascorbic acid 500 milliGRAM(s) Oral two times a day  cyanocobalamin 1000 MICROGram(s) Oral daily  dexamethasone     Tablet 3 milliGRAM(s) Oral every 8 hours  docusate sodium 100 milliGRAM(s) Oral three times a day  enoxaparin Injectable 30 milliGRAM(s) SubCutaneous at bedtime  folic acid 1 milliGRAM(s) Oral daily  lidocaine 1% Injectable 0.2 milliLiter(s) Local Injection once  multivitamin 1 Tablet(s) Oral daily  pantoprazole    Tablet 40 milliGRAM(s) Oral before breakfast  senna 2 Tablet(s) Oral at bedtime    MEDICATIONS  (PRN):  acetaminophen   Tablet .. 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  Elbow fracture, right: history -  HTN (hypertension)  History of hemorrhoidectomy  History of left hip replacement: 10 years ago  [x ] Reviewed     SOCIAL HISTORY:  Residence: [ ] [facility]  [ ] SNF  [x ] Community  [ ] Substance abuse: none  [ ] Tobacco: never  [ ] Alcohol use: none    FAMILY HISTORY:  [x ] Heart disease in son    REVIEW OF SYSTEMS:    CONSTITUTIONAL: No fever, weight loss, or fatigue  EYES: No eye pain, visual disturbances, or discharge  ENMT:  No difficulty hearing, tinnitus, vertigo; No sinus or throat pain  RESPIRATORY: No cough, wheezing, chills or hemoptysis; No shortness of breath  CARDIOVASCULAR: No chest pain, palpitations, dizziness, or leg swelling  GASTROINTESTINAL: No abdominal or epigastric pain. No nausea, vomiting, or hematemesis; No diarrhea or constipation. No melena or hematochezia.  GENITOURINARY: No dysuria, frequency, hematuria, or incontinence  NEUROLOGICAL: No headaches, memory loss, loss of strength, numbness, or tremors  SKIN: No itching, burning, rashes, or lesions   MUSCULOSKELETAL: soreness at surgical site  PSYCHIATRIC: No depression, anxiety, mood swings, or difficulty sleeping  ALLERGY AND IMMUNOLOGIC: No hives or eczema    [  ] All other ROS negative  [  ] Unable to obtain due to poor mental status    Vital Signs Last 24 Hrs  T(C): 36.7 (01 Aug 2019 14:42), Max: 36.8 (31 Jul 2019 16:15)  T(F): 98.1 (01 Aug 2019 14:42), Max: 98.3 (31 Jul 2019 16:15)  HR: 56 (01 Aug 2019 14:42) (51 - 90)  BP: 146/63 (01 Aug 2019 14:42) (113/61 - 146/63)  BP(mean): --  RR: 18 (01 Aug 2019 14:42) (17 - 18)  SpO2: 96% (01 Aug 2019 14:42) (93% - 97%)    PHYSICAL EXAM:    GENERAL: NAD, well-groomed, well-developed  HEAD:  Atraumatic, Normocephalic  EYES: EOMI, PERRLA, conjunctiva and sclera clear  ENMT: Moist mucous membranes  NECK: Supple, No JVD  RESPIRATORY: Clear to auscultation bilaterally; No rales, rhonchi, wheezing, or rubs  CARDIOVASCULAR: Regular rate and rhythm; No murmurs, rubs, or gallops  GASTROINTESTINAL: Soft, Nontender, Nondistended; Bowel sounds present  EXTREMITIES:  2+ Peripheral Pulses, No clubbing, cyanosis, or edema  NEURO:  Alert & Oriented X3; Moving all 4 extremities; No gross sensory deficits  HEME/LYMPH: No lymphadenopathy noted  SKIN: No rashes or lesions; Incisions C/D/I    LABS:                        11.8   16.3  )-----------( 241      ( 31 Jul 2019 07:04 )             37.6     08-01    140  |  107  |  36<H>  ----------------------------<  99  5.0   |  23  |  1.33<H>    Ca    8.5      01 Aug 2019 07:21    TPro  x   /  Alb  3.3  /  TBili  x   /  DBili  x   /  AST  x   /  ALT  x   /  AlkPhos  x   07-30        CAPILLARY BLOOD GLUCOSE            RADIOLOGY & ADDITIONAL STUDIES:    EKG:   Personally Reviewed:  [ ] YES - reviewed PMD documentation of prior ekg - NSR w/poor R wave progression    Imaging:   Personally Reviewed:  [x ] YES < from: [facility] Duplex Lower Ext Vein Scan, Bilat (07.30.19 @ 23:21) >    No evidence of deep venous thrombosis in either lower extremity.    < end of copied text >                Consultant(s) notes reviewed:    Care Discussed with Consultant(s)/Other Providers:    Advanced Directives: [ ] DNR  [ ] No feeding tube  [ ] MOLST in chart  [ ] MOLST completed today  [ ] Unknown    [x ] Probable osteoporosis  [ ] Possible osteomalacia  [ ] Increased delirium risk  [ ] Delirium and other risks can be reduced by:          -early ambulation          -minimizing "tethers" - IV, oxygen, catheters, etc          -avoiding hypnotics and sedatives          -maintaining hydration/nutrition          -avoid anticholinergics - diphenhydramine, etc          -pain control          -supportive environment

## 2019-08-01 NOTE — CONSULT NOTE ADULT - PROBLEM SELECTOR RECOMMENDATION 9
baseline is ~1.1, now at 1.3-1.4, not meeting criteria for SULY  holding ARB  BP is ok   resume as outpatient after BMP checked  PO intake encouraged

## 2019-08-01 NOTE — PROGRESS NOTE ADULT - ASSESSMENT
HPI:  Patient is a 89 year old female with gait and balance instability with difficulty walking.  CT revealed T8 lesion.  Now presented for surgery.    PROCEDURE: s/p t7-t9 laminectomy for tumor resection on 7/30/2019   POD#3    PLAN:  -continue decadron taper  -oxycodone for pain control  -lovenox and SCDs for DVT prophylaxis  -senna and colace for bowel regimen  -hospitalist to see for elevated Cr, hold losartan for now  -sodium and cholesterol restricted diet  -out of bed with assistance  -incentive spirometer for lung expansion  -pt - home pt w/ ingrid walker upon discharge, she has shower chair and grab bars for walk in shower  -am labs    Spectra #[id number]              Assessment:  Please Check When Present   []  GCS  E   V  M     Heart Failure: []Acute, [] acute on chronic , []chronic  Heart Failure:  [] Diastolic (HFpEF), [] Systolic (HFrEF), []Combined (HFpEF and HFrEF), [] RHF, [] Pulm HTN, [] Other    [] SULY, [] ATN, [] AIN, [] other  [] CKD1, [] CKD2, [] CKD 3, [] CKD 4, [] CKD 5, []ESRD    Encephalopathy: [] Metabolic, [] Hepatic, [] toxic, [] Neurological, [] Other    Abnormal Nurtitional Status: [] malnurtition (see nutrition note), [ ]underweight: BMI < 19, [] morbid obesity: BMI >40, [] Cachexia    [] Sepsis  [] hypovolemic shock,[] cardiogenic shock, [] hemorrhagic shock, [] neuogenic shock  [] Acute Respiratory Failure  []Cerebral edema, [] Brain compression/ herniation,   [] Functional quadriplegia  [] Acute blood loss anemia

## 2019-08-01 NOTE — PROGRESS NOTE ADULT - SUBJECTIVE AND OBJECTIVE BOX
HPI:  Patient is a 89 year old female with gait and balance instability with difficulty walking.  CT revealed T8 lesion.  Now presented for surgery.    OVERNIGHT EVENTS:  No acute events overnight.  Incisional pain well controlled on current regimen.  Cr mildly elevated today, losartan on hold, hospitalist consulted.  Tolerating diet.  Has been out of bed.    Vital Signs Last 24 Hrs  T(C): 36.8 (01 Aug 2019 09:22), Max: 36.8 (31 Jul 2019 16:15)  T(F): 98.3 (01 Aug 2019 09:22), Max: 98.3 (31 Jul 2019 16:15)  HR: 60 (01 Aug 2019 09:22) (51 - 90)  BP: 134/67 (01 Aug 2019 09:22) (113/61 - 139/58)  BP(mean): --  RR: 18 (01 Aug 2019 09:22) (17 - 18)  SpO2: 96% (01 Aug 2019 09:22) (93% - 97%)        PHYSICAL EXAM:  Neurological: awake, alert, oriented x3, follows commands, speech clear and fluent, moves all extremities x4 w/ 5/5 strength throughout    Cardiovascular: +s1, s2  Respiratory: clear to auscultation b/l  Gastrointestinal: soft, non-distended, non-tender  Genitourinary: +voiding  Incision/Wound: posterior midline vertical incision dry and intact w/ steri strips, mild blood tinged at inferior aspect    TUBES/LINES:  [x] none    DIET:  [x] sodium and cholesterol restricted    LABS:                        11.8   16.3  )-----------( 241      ( 31 Jul 2019 07:04 )             37.6     08-01    140  |  107  |  36<H>  ----------------------------<  99  5.0   |  23  |  1.33<H>    Ca    8.5      01 Aug 2019 07:21    TPro  x   /  Alb  3.3  /  TBili  x   /  DBili  x   /  AST  x   /  ALT  x   /  AlkPhos  x   07-30        Allergies    No Known Allergies          MEDICATIONS:  Antibiotics:  none    Neuro:  acetaminophen   Tablet .. 650 milliGRAM(s) Oral every 6 hours PRN  HYDROmorphone  Injectable 1 milliGRAM(s) SubCutaneous every 6 hours PRN  oxyCODONE    IR 5 milliGRAM(s) Oral every 6 hours PRN  oxyCODONE    IR 10 milliGRAM(s) Oral every 6 hours PRN    Anticoagulation:  enoxaparin Injectable 30 milliGRAM(s) SubCutaneous at bedtime    OTHER:  dexamethasone     Tablet 3 milliGRAM(s) Oral every 8 hours  docusate sodium 100 milliGRAM(s) Oral three times a day  lidocaine 1% Injectable 0.2 milliLiter(s) Local Injection once  pantoprazole    Tablet 40 milliGRAM(s) Oral before breakfast  senna 2 Tablet(s) Oral at bedtime    IVF:  ascorbic acid 500 milliGRAM(s) Oral two times a day  cyanocobalamin 1000 MICROGram(s) Oral daily  folic acid 1 milliGRAM(s) Oral daily  multivitamin 1 Tablet(s) Oral daily    CULTURES:  none    RADIOLOGY & ADDITIONAL TESTS:  no new imaging

## 2019-08-02 ENCOUNTER — TRANSCRIPTION ENCOUNTER (OUTPATIENT)
Age: 84
End: 2019-08-02

## 2019-08-02 LAB
ANION GAP SERPL CALC-SCNC: 11 MMOL/L — SIGNIFICANT CHANGE UP (ref 5–17)
BUN SERPL-MCNC: 31 MG/DL — HIGH (ref 7–23)
CALCIUM SERPL-MCNC: 8.7 MG/DL — SIGNIFICANT CHANGE UP (ref 8.4–10.5)
CHLORIDE SERPL-SCNC: 107 MMOL/L — SIGNIFICANT CHANGE UP (ref 96–108)
CO2 SERPL-SCNC: 22 MMOL/L — SIGNIFICANT CHANGE UP (ref 22–31)
CREAT SERPL-MCNC: 1.14 MG/DL — SIGNIFICANT CHANGE UP (ref 0.5–1.3)
GLUCOSE SERPL-MCNC: 96 MG/DL — SIGNIFICANT CHANGE UP (ref 70–99)
HCT VFR BLD CALC: 37.2 % — SIGNIFICANT CHANGE UP (ref 34.5–45)
HGB BLD-MCNC: 11.8 G/DL — SIGNIFICANT CHANGE UP (ref 11.5–15.5)
MCHC RBC-ENTMCNC: 29.7 PG — SIGNIFICANT CHANGE UP (ref 27–34)
MCHC RBC-ENTMCNC: 31.7 GM/DL — LOW (ref 32–36)
MCV RBC AUTO: 93.7 FL — SIGNIFICANT CHANGE UP (ref 80–100)
PLATELET # BLD AUTO: 243 K/UL — SIGNIFICANT CHANGE UP (ref 150–400)
POTASSIUM SERPL-MCNC: 5.2 MMOL/L — SIGNIFICANT CHANGE UP (ref 3.5–5.3)
POTASSIUM SERPL-SCNC: 5.2 MMOL/L — SIGNIFICANT CHANGE UP (ref 3.5–5.3)
RBC # BLD: 3.97 M/UL — SIGNIFICANT CHANGE UP (ref 3.8–5.2)
RBC # FLD: 12.9 % — SIGNIFICANT CHANGE UP (ref 10.3–14.5)
SODIUM SERPL-SCNC: 140 MMOL/L — SIGNIFICANT CHANGE UP (ref 135–145)
WBC # BLD: 12 K/UL — HIGH (ref 3.8–10.5)
WBC # FLD AUTO: 12 K/UL — HIGH (ref 3.8–10.5)

## 2019-08-02 PROCEDURE — 99233 SBSQ HOSP IP/OBS HIGH 50: CPT

## 2019-08-02 RX ORDER — HYDRALAZINE HCL 50 MG
5 TABLET ORAL ONCE
Refills: 0 | Status: COMPLETED | OUTPATIENT
Start: 2019-08-02 | End: 2019-08-02

## 2019-08-02 RX ORDER — DEXAMETHASONE 0.5 MG/5ML
2 ELIXIR ORAL EVERY 8 HOURS
Refills: 0 | Status: DISCONTINUED | OUTPATIENT
Start: 2019-08-02 | End: 2019-08-03

## 2019-08-02 RX ORDER — LOSARTAN POTASSIUM 100 MG/1
25 TABLET, FILM COATED ORAL DAILY
Refills: 0 | Status: DISCONTINUED | OUTPATIENT
Start: 2019-08-02 | End: 2019-08-03

## 2019-08-02 RX ORDER — POLYETHYLENE GLYCOL 3350 17 G/17G
17 POWDER, FOR SOLUTION ORAL
Refills: 0 | Status: DISCONTINUED | OUTPATIENT
Start: 2019-08-02 | End: 2019-08-03

## 2019-08-02 RX ORDER — CYCLOBENZAPRINE HYDROCHLORIDE 10 MG/1
5 TABLET, FILM COATED ORAL
Refills: 0 | Status: DISCONTINUED | OUTPATIENT
Start: 2019-08-02 | End: 2019-08-03

## 2019-08-02 RX ADMIN — Medication 100 MILLIGRAM(S): at 05:14

## 2019-08-02 RX ADMIN — PREGABALIN 1000 MICROGRAM(S): 225 CAPSULE ORAL at 14:04

## 2019-08-02 RX ADMIN — Medication 100 MILLIGRAM(S): at 22:22

## 2019-08-02 RX ADMIN — Medication 3 MILLIGRAM(S): at 14:42

## 2019-08-02 RX ADMIN — LOSARTAN POTASSIUM 25 MILLIGRAM(S): 100 TABLET, FILM COATED ORAL at 14:42

## 2019-08-02 RX ADMIN — CYCLOBENZAPRINE HYDROCHLORIDE 5 MILLIGRAM(S): 10 TABLET, FILM COATED ORAL at 18:43

## 2019-08-02 RX ADMIN — POLYETHYLENE GLYCOL 3350 17 GRAM(S): 17 POWDER, FOR SOLUTION ORAL at 14:05

## 2019-08-02 RX ADMIN — ENOXAPARIN SODIUM 30 MILLIGRAM(S): 100 INJECTION SUBCUTANEOUS at 22:19

## 2019-08-02 RX ADMIN — Medication 100 MILLIGRAM(S): at 14:04

## 2019-08-02 RX ADMIN — Medication 650 MILLIGRAM(S): at 10:37

## 2019-08-02 RX ADMIN — POLYETHYLENE GLYCOL 3350 17 GRAM(S): 17 POWDER, FOR SOLUTION ORAL at 22:20

## 2019-08-02 RX ADMIN — Medication 3 MILLIGRAM(S): at 05:14

## 2019-08-02 RX ADMIN — Medication 650 MILLIGRAM(S): at 22:50

## 2019-08-02 RX ADMIN — CYCLOBENZAPRINE HYDROCHLORIDE 5 MILLIGRAM(S): 10 TABLET, FILM COATED ORAL at 10:37

## 2019-08-02 RX ADMIN — Medication 1 TABLET(S): at 14:04

## 2019-08-02 RX ADMIN — Medication 5 MILLIGRAM(S): at 10:38

## 2019-08-02 RX ADMIN — Medication 500 MILLIGRAM(S): at 05:14

## 2019-08-02 RX ADMIN — Medication 1 MILLIGRAM(S): at 14:04

## 2019-08-02 RX ADMIN — Medication 650 MILLIGRAM(S): at 11:00

## 2019-08-02 RX ADMIN — PANTOPRAZOLE SODIUM 40 MILLIGRAM(S): 20 TABLET, DELAYED RELEASE ORAL at 05:13

## 2019-08-02 RX ADMIN — Medication 650 MILLIGRAM(S): at 22:20

## 2019-08-02 RX ADMIN — Medication 2 MILLIGRAM(S): at 22:19

## 2019-08-02 NOTE — PROGRESS NOTE ADULT - SUBJECTIVE AND OBJECTIVE BOX
SUBJECTIVE: Patient seen and examined at bedside. No acute overnight events; hypertensive this morning. Patient feels well but sore and is nervous about going home today as she lives alone. Denies chest pain, shortness of breath, nausea/vomiting.     Vital Signs Last 24 Hrs  T(C): 36.8 (01 Aug 2019 09:22), Max: 36.8 (31 Jul 2019 16:15)  T(F): 98.3 (01 Aug 2019 09:22), Max: 98.3 (31 Jul 2019 16:15)  HR: 60 (01 Aug 2019 09:22) (51 - 90)  BP: 134/67 (01 Aug 2019 09:22) (113/61 - 139/58)  RR: 18 (01 Aug 2019 09:22) (17 - 18)  SpO2: 96% (01 Aug 2019 09:22) (93% - 97%)        PHYSICAL EXAM:  Neurological: awake, alert, oriented x3, follows commands, speech clear and fluent, moves all extremities x4 w/ 5/5 strength throughout  Incision/Wound: posterior midline vertical incision dry and intact w/ steri strips, mild blood tinged at inferior aspect  Cardiovascular: +s1, s2  Respiratory: clear to auscultation b/l  Gastrointestinal: soft, non-distended, non-tender  Genitourinary: +voiding      DIET:  [x] sodium and cholesterol restricted      LABS:                           11.8   12.0  )-----------( 243      ( 02 Aug 2019 07:09 )             37.2     08-02    140  |  107  |  31<H>  ----------------------------<  96  5.2   |  22  |  1.14    Ca    8.7      02 Aug 2019 07:07            Allergies    No Known Allergies          MEDICATIONS:  Antibiotics:  none    Neuro:  acetaminophen   Tablet .. 650 milliGRAM(s) Oral every 6 hours PRN  HYDROmorphone  Injectable 1 milliGRAM(s) SubCutaneous every 6 hours PRN  oxyCODONE    IR 5 milliGRAM(s) Oral every 6 hours PRN  oxyCODONE    IR 10 milliGRAM(s) Oral every 6 hours PRN    Anticoagulation:  enoxaparin Injectable 30 milliGRAM(s) SubCutaneous at bedtime    OTHER:  dexamethasone     Tablet 3 milliGRAM(s) Oral every 8 hours  docusate sodium 100 milliGRAM(s) Oral three times a day  lidocaine 1% Injectable 0.2 milliLiter(s) Local Injection once  pantoprazole    Tablet 40 milliGRAM(s) Oral before breakfast  senna 2 Tablet(s) Oral at bedtime    IVF:  ascorbic acid 500 milliGRAM(s) Oral two times a day  cyanocobalamin 1000 MICROGram(s) Oral daily  folic acid 1 milliGRAM(s) Oral daily  multivitamin 1 Tablet(s) Oral daily    CULTURES:  none    RADIOLOGY & ADDITIONAL TESTS:  no new imaging

## 2019-08-02 NOTE — DISCHARGE NOTE PROVIDER - CARE PROVIDER_API CALL
Fany Peña (DO)  Neurological Surgery  [address] [place] [address], [address]  [place] [address]  Phone: [phone]  Fax: [phone]  Follow Up Time:

## 2019-08-02 NOTE — DISCHARGE NOTE PROVIDER - NSDCCPCAREPLAN_GEN_ALL_CORE_FT
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 Peña in 1-2 weeks. Call [phone] to schedule an appointment. Your stitches are absorbable and steri strips will fall off on their own. Keep incision site clean and dry. No creams, lotions, or ointments to incision area. Ok to shower, but keep incision site clean and dry.   NO heavy lifting, strenuous activity, twisting, bending, driving, or working until cleared by your physician.   Return to ER immediately for any of the following: fever, bleeding, new onset numbness/tingling/weakness, nausea and/or vomiting, chest pain, shortness of breath, confusion, seizure, altered mental status, urinary and/or fecal incontinence or retention.      SECONDARY DISCHARGE DIAGNOSES  Diagnosis: Essential hypertension  Assessment and Plan of Treatment: Please make an appointment for follow up with your primary care physician after discharge. Continue Losartan at a lower dose than what you were taking previously. We have put you on Losartan 25 mg daily.

## 2019-08-02 NOTE — PROVIDER CONTACT NOTE (OTHER) - ASSESSMENT
Pt resting in bed, calm, denies pain, denies H/A. /72 and HR is 74. Most recent temp 97.8 and O2 sat 98% on RA.

## 2019-08-02 NOTE — DISCHARGE NOTE PROVIDER - NSDCACTIVITY_GEN_ALL_CORE
Showering allowed/Walking - Indoors allowed/Walking - Outdoors allowed/No heavy lifting/straining/Do not drive or operate machinery/Do not make important decisions/Stairs allowed/No restrictions

## 2019-08-02 NOTE — PROGRESS NOTE ADULT - ASSESSMENT
HPI: Patient is a 89 year old female with gait and balance instability with difficulty walking.  CT revealed T8 lesion.  Now presented for surgery.    PROCEDURE: 7/30 s/p T7-9 laminectomy for tumor resection; POD#3    PLAN:  - Continue decadron taper  - Continue oxycodone for pain control; low dose Flexeril for muscle spasm  - Hospitalist medicine following. Resumed lower dose of Losartan given elevated Cr. F/u BMP in am.   - Continue colace, senna for bowel regimen  - Encouraged mobilization  - Encouraged incentive spirometer  - DVT prophylaxis: SQL 30 qHS, b/l venodynes  - Dispo: home PT w/ ingrid rolling walker  - Anticipate discharge home tomorrow  - Will discuss with Dr. Casey Fam # [phone]    Assessment:  Please Check When Present   []  GCS  E   V  M     Heart Failure: []Acute, [] acute on chronic , []chronic  Heart Failure:  [] Diastolic (HFpEF), [] Systolic (HFrEF), []Combined (HFpEF and HFrEF), [] RHF, [] Pulm HTN, [] Other    [] SULY, [] ATN, [] AIN, [] other  [] CKD1, [] CKD2, [] CKD 3, [] CKD 4, [] CKD 5, []ESRD    Encephalopathy: [] Metabolic, [] Hepatic, [] toxic, [] Neurological, [] Other    Abnormal Nurtitional Status: [] malnurtition (see nutrition note), [ ]underweight: BMI < 19, [] morbid obesity: BMI >40, [] Cachexia    [] Sepsis  [] hypovolemic shock,[] cardiogenic shock, [] hemorrhagic shock, [] neuogenic shock  [] Acute Respiratory Failure  []Cerebral edema, [] Brain compression/ herniation,   [] Functional quadriplegia  [] Acute blood loss anemia

## 2019-08-02 NOTE — PROGRESS NOTE ADULT - PROBLEM SELECTOR PLAN 3
elevated today - would resume Losartan at lower dose - check BMP in AM if staying, otherwise have PCP check BMP within 1 week    discussed r/b norvasc as alternative - reasonable option either way but at this point can restart home med at lower dose

## 2019-08-02 NOTE — DISCHARGE NOTE PROVIDER - HOSPITAL COURSE
Patient is a 89 year old female with a past medical history of HTN, osteoporosis, and left hip replacement who presented as outpatient with gait and balance instability with difficulty walking.  CT revealed T8 lesion.  She was admitted on 7/30 through same day admitting and underwent     T7-9 laminectomy for T8 spinal tumor resection. Hospital course notable for an elevation in creatinine, higher than patient's baseline. Losartan was discontinued but Creatinine improved to her baseline value and Losartan resumed at lower dose. She was evaluated by physical therapy who recommended home PT with ingrid walker. On the day of discharge, she is medically and neurosurgically stable and cleared for discharge home.

## 2019-08-02 NOTE — PROGRESS NOTE ADULT - SUBJECTIVE AND OBJECTIVE BOX
Authored by Dr Abdirashid Aranda [phone]  After hours or if no response please page Hospitalist service [phone]    Patient is a 89y old  Female who presents with a chief complaint of Spinal lesion (01 Aug 2019 15:31)    SUBJECTIVE / OVERNIGHT EVENTS:    MEDICATIONS  (STANDING):  ascorbic acid 500 milliGRAM(s) Oral two times a day  cyanocobalamin 1000 MICROGram(s) Oral daily  dexamethasone     Tablet 3 milliGRAM(s) Oral every 8 hours  docusate sodium 100 milliGRAM(s) Oral three times a day  enoxaparin Injectable 30 milliGRAM(s) SubCutaneous at bedtime  folic acid 1 milliGRAM(s) Oral daily  lidocaine 1% Injectable 0.2 milliLiter(s) Local Injection once  losartan 25 milliGRAM(s) Oral daily  multivitamin 1 Tablet(s) Oral daily  pantoprazole    Tablet 40 milliGRAM(s) Oral before breakfast  polyethylene glycol 3350 17 Gram(s) Oral two times a day  senna 2 Tablet(s) Oral at bedtime    MEDICATIONS  (PRN):  acetaminophen   Tablet .. 650 milliGRAM(s) Oral every 6 hours PRN Temp greater or equal to 38C (100.4F), Mild Pain (1 - 3)  cyclobenzaprine 5 milliGRAM(s) Oral two times a day PRN Muscle Spasm  HYDROmorphone  Injectable 1 milliGRAM(s) SubCutaneous every 6 hours PRN breakthorugh pain  oxyCODONE    IR 5 milliGRAM(s) Oral every 6 hours PRN Moderate Pain (4 - 6)  oxyCODONE    IR 10 milliGRAM(s) Oral every 6 hours PRN Severe Pain (7 - 10)      Vital Signs Last 24 Hrs  T(C): 36.6 (02 Aug 2019 05:21), Max: 37 (01 Aug 2019 16:25)  T(F): 97.8 (02 Aug 2019 05:21), Max: 98.6 (01 Aug 2019 16:25)  HR: 74 (02 Aug 2019 06:40) (56 - 85)  BP: 175/72 (02 Aug 2019 06:40) (136/68 - 175/72)  BP(mean): --  RR: 18 (02 Aug 2019 05:21) (18 - 18)  SpO2: 98% (02 Aug 2019 05:21) (95% - 98%)  CAPILLARY BLOOD GLUCOSE        I&O's Summary    01 Aug 2019 07:01  -  02 Aug 2019 07:00  --------------------------------------------------------  IN: 940 mL / OUT: 300 mL / NET: 640 mL    02 Aug 2019 07:01  -  02 Aug 2019 13:57  --------------------------------------------------------  IN: 240 mL / OUT: 500 mL / NET: -260 mL        PHYSICAL EXAM  GENERAL: NAD, well-groomed, well-developed  EYES: EOMI, PERRLA, conjunctiva and sclera clear  ENMT: Moist mucous membranes  NECK: Supple, No JVD  RESPIRATORY: Clear to auscultation bilaterally; No rales, rhonchi, wheezing, or rubs  CARDIOVASCULAR: Regular rate and rhythm; No murmurs, rubs, or gallops  GASTROINTESTINAL: Soft, Nontender, Nondistended; Bowel sounds present  EXTREMITIES:  2+ Peripheral Pulses, No clubbing, cyanosis, or edema  NEURO:  Alert & Oriented X3; Moving all 4 extremities; No gross sensory deficits  HEME/LYMPH: No lymphadenopathy noted  SKIN: No rashes or lesions; Incisions C/D/I    LABS:                        11.8   12.0  )-----------( 243      ( 02 Aug 2019 07:09 )             37.2     08-02    140  |  107  |  31<H>  ----------------------------<  96  5.2   |  22  |  1.14    Ca    8.7      02 Aug 2019 07:07          RADIOLOGY & ADDITIONAL TESTS:    Imaging Personally Reviewed:  Consultant(s) Notes Reviewed:    Care Discussed with Consultants/Other Providers: JOY gibson Authored by Dr Abdirashid Aranda [phone]  After hours or if no response please page Hospitalist service [phone]    Patient is a 89y old  Female who presents with a chief complaint of Spinal lesion (01 Aug 2019 15:31)    SUBJECTIVE / OVERNIGHT EVENTS: c/o stable back soreness    MEDICATIONS  (STANDING):  ascorbic acid 500 milliGRAM(s) Oral two times a day  cyanocobalamin 1000 MICROGram(s) Oral daily  dexamethasone     Tablet 3 milliGRAM(s) Oral every 8 hours  docusate sodium 100 milliGRAM(s) Oral three times a day  enoxaparin Injectable 30 milliGRAM(s) SubCutaneous at bedtime  folic acid 1 milliGRAM(s) Oral daily  lidocaine 1% Injectable 0.2 milliLiter(s) Local Injection once  losartan 25 milliGRAM(s) Oral daily  multivitamin 1 Tablet(s) Oral daily  pantoprazole    Tablet 40 milliGRAM(s) Oral before breakfast  polyethylene glycol 3350 17 Gram(s) Oral two times a day  senna 2 Tablet(s) Oral at bedtime    MEDICATIONS  (PRN):  acetaminophen   Tablet .. 650 milliGRAM(s) Oral every 6 hours PRN Temp greater or equal to 38C (100.4F), Mild Pain (1 - 3)  cyclobenzaprine 5 milliGRAM(s) Oral two times a day PRN Muscle Spasm  HYDROmorphone  Injectable 1 milliGRAM(s) SubCutaneous every 6 hours PRN breakthorugh pain  oxyCODONE    IR 5 milliGRAM(s) Oral every 6 hours PRN Moderate Pain (4 - 6)  oxyCODONE    IR 10 milliGRAM(s) Oral every 6 hours PRN Severe Pain (7 - 10)      Vital Signs Last 24 Hrs  T(C): 36.6 (02 Aug 2019 05:21), Max: 37 (01 Aug 2019 16:25)  T(F): 97.8 (02 Aug 2019 05:21), Max: 98.6 (01 Aug 2019 16:25)  HR: 74 (02 Aug 2019 06:40) (56 - 85)  BP: 175/72 (02 Aug 2019 06:40) (136/68 - 175/72)  BP(mean): --  RR: 18 (02 Aug 2019 05:21) (18 - 18)  SpO2: 98% (02 Aug 2019 05:21) (95% - 98%)  CAPILLARY BLOOD GLUCOSE        I&O's Summary    01 Aug 2019 07:01  -  02 Aug 2019 07:00  --------------------------------------------------------  IN: 940 mL / OUT: 300 mL / NET: 640 mL    02 Aug 2019 07:01  -  02 Aug 2019 13:57  --------------------------------------------------------  IN: 240 mL / OUT: 500 mL / NET: -260 mL        PHYSICAL EXAM  GENERAL: NAD, well-groomed, well-developed  EYES: EOMI, PERRLA, conjunctiva and sclera clear  ENMT: Moist mucous membranes  NECK: Supple, No JVD  RESPIRATORY: Clear to auscultation bilaterally; No rales, rhonchi, wheezing, or rubs  CARDIOVASCULAR: Regular rate and rhythm; No murmurs, rubs, or gallops  GASTROINTESTINAL: Soft, Nontender, Nondistended; Bowel sounds present  EXTREMITIES:  2+ Peripheral Pulses, No clubbing, cyanosis, or edema  NEURO:  Alert & Oriented X3; Moving all 4 extremities; No gross sensory deficits  HEME/LYMPH: No lymphadenopathy noted  SKIN: No rashes or lesions; Incisions C/D/I    LABS:                        11.8   12.0  )-----------( 243      ( 02 Aug 2019 07:09 )             37.2     08-02    140  |  107  |  31<H>  ----------------------------<  96  5.2   |  22  |  1.14    Ca    8.7      02 Aug 2019 07:07          RADIOLOGY & ADDITIONAL TESTS:    Imaging Personally Reviewed:  Consultant(s) Notes Reviewed:    Care Discussed with Consultants/Other Providers: JOY sood htn

## 2019-08-03 VITALS
DIASTOLIC BLOOD PRESSURE: 75 MMHG | SYSTOLIC BLOOD PRESSURE: 152 MMHG | OXYGEN SATURATION: 97 % | HEART RATE: 74 BPM | RESPIRATION RATE: 18 BRPM | TEMPERATURE: 98 F

## 2019-08-03 LAB
ANION GAP SERPL CALC-SCNC: 10 MMOL/L — SIGNIFICANT CHANGE UP (ref 5–17)
BUN SERPL-MCNC: 34 MG/DL — HIGH (ref 7–23)
CALCIUM SERPL-MCNC: 8.5 MG/DL — SIGNIFICANT CHANGE UP (ref 8.4–10.5)
CHLORIDE SERPL-SCNC: 106 MMOL/L — SIGNIFICANT CHANGE UP (ref 96–108)
CO2 SERPL-SCNC: 24 MMOL/L — SIGNIFICANT CHANGE UP (ref 22–31)
CREAT SERPL-MCNC: 1.12 MG/DL — SIGNIFICANT CHANGE UP (ref 0.5–1.3)
GLUCOSE SERPL-MCNC: 93 MG/DL — SIGNIFICANT CHANGE UP (ref 70–99)
HCT VFR BLD CALC: 38.2 % — SIGNIFICANT CHANGE UP (ref 34.5–45)
HGB BLD-MCNC: 12 G/DL — SIGNIFICANT CHANGE UP (ref 11.5–15.5)
MCHC RBC-ENTMCNC: 29.4 PG — SIGNIFICANT CHANGE UP (ref 27–34)
MCHC RBC-ENTMCNC: 31.3 GM/DL — LOW (ref 32–36)
MCV RBC AUTO: 93.9 FL — SIGNIFICANT CHANGE UP (ref 80–100)
PLATELET # BLD AUTO: 255 K/UL — SIGNIFICANT CHANGE UP (ref 150–400)
POTASSIUM SERPL-MCNC: 5.2 MMOL/L — SIGNIFICANT CHANGE UP (ref 3.5–5.3)
POTASSIUM SERPL-SCNC: 5.2 MMOL/L — SIGNIFICANT CHANGE UP (ref 3.5–5.3)
RBC # BLD: 4.07 M/UL — SIGNIFICANT CHANGE UP (ref 3.8–5.2)
RBC # FLD: 13.1 % — SIGNIFICANT CHANGE UP (ref 10.3–14.5)
SODIUM SERPL-SCNC: 140 MMOL/L — SIGNIFICANT CHANGE UP (ref 135–145)
WBC # BLD: 10.9 K/UL — HIGH (ref 3.8–10.5)
WBC # FLD AUTO: 10.9 K/UL — HIGH (ref 3.8–10.5)

## 2019-08-03 PROCEDURE — 88360 TUMOR IMMUNOHISTOCHEM/MANUAL: CPT

## 2019-08-03 PROCEDURE — 86900 BLOOD TYPING SEROLOGIC ABO: CPT

## 2019-08-03 PROCEDURE — 82040 ASSAY OF SERUM ALBUMIN: CPT

## 2019-08-03 PROCEDURE — 97161 PT EVAL LOW COMPLEX 20 MIN: CPT

## 2019-08-03 PROCEDURE — 97116 GAIT TRAINING THERAPY: CPT

## 2019-08-03 PROCEDURE — 93970 EXTREMITY STUDY: CPT

## 2019-08-03 PROCEDURE — C1889: CPT

## 2019-08-03 PROCEDURE — 80048 BASIC METABOLIC PNL TOTAL CA: CPT

## 2019-08-03 PROCEDURE — 97530 THERAPEUTIC ACTIVITIES: CPT

## 2019-08-03 PROCEDURE — 85027 COMPLETE CBC AUTOMATED: CPT

## 2019-08-03 PROCEDURE — 88342 IMHCHEM/IMCYTCHM 1ST ANTB: CPT

## 2019-08-03 PROCEDURE — 76000 FLUOROSCOPY <1 HR PHYS/QHP: CPT

## 2019-08-03 PROCEDURE — 88307 TISSUE EXAM BY PATHOLOGIST: CPT

## 2019-08-03 PROCEDURE — C1769: CPT

## 2019-08-03 PROCEDURE — 82962 GLUCOSE BLOOD TEST: CPT

## 2019-08-03 PROCEDURE — 86901 BLOOD TYPING SEROLOGIC RH(D): CPT

## 2019-08-03 RX ORDER — DOCUSATE SODIUM 100 MG
1 CAPSULE ORAL
Qty: 100 | Refills: 0
Start: 2019-08-03

## 2019-08-03 RX ORDER — LOSARTAN POTASSIUM 100 MG/1
1 TABLET, FILM COATED ORAL
Qty: 30 | Refills: 0
Start: 2019-08-03

## 2019-08-03 RX ORDER — DEXAMETHASONE 0.5 MG/5ML
1 ELIXIR ORAL
Qty: 5 | Refills: 0
Start: 2019-08-03

## 2019-08-03 RX ORDER — DEXAMETHASONE 0.5 MG/5ML
2 ELIXIR ORAL EVERY 12 HOURS
Refills: 0 | Status: DISCONTINUED | OUTPATIENT
Start: 2019-08-03 | End: 2019-08-03

## 2019-08-03 RX ORDER — LOSARTAN POTASSIUM 100 MG/1
1 TABLET, FILM COATED ORAL
Qty: 0 | Refills: 0 | DISCHARGE

## 2019-08-03 RX ORDER — SENNA PLUS 8.6 MG/1
2 TABLET ORAL
Qty: 100 | Refills: 0
Start: 2019-08-03

## 2019-08-03 RX ORDER — CYCLOBENZAPRINE HYDROCHLORIDE 10 MG/1
1 TABLET, FILM COATED ORAL
Qty: 10 | Refills: 0
Start: 2019-08-03

## 2019-08-03 RX ORDER — ASCORBIC ACID 60 MG
1 TABLET,CHEWABLE ORAL
Qty: 0 | Refills: 0 | DISCHARGE
Start: 2019-08-03

## 2019-08-03 RX ORDER — OXYCODONE HYDROCHLORIDE 5 MG/1
1 TABLET ORAL
Qty: 10 | Refills: 0
Start: 2019-08-03

## 2019-08-03 RX ORDER — ACETAMINOPHEN 500 MG
2 TABLET ORAL
Qty: 0 | Refills: 0 | DISCHARGE
Start: 2019-08-03

## 2019-08-03 RX ADMIN — Medication 100 MILLIGRAM(S): at 07:00

## 2019-08-03 RX ADMIN — PANTOPRAZOLE SODIUM 40 MILLIGRAM(S): 20 TABLET, DELAYED RELEASE ORAL at 07:00

## 2019-08-03 RX ADMIN — Medication 2 MILLIGRAM(S): at 07:00

## 2019-08-03 RX ADMIN — Medication 500 MILLIGRAM(S): at 07:00

## 2019-08-03 RX ADMIN — LOSARTAN POTASSIUM 25 MILLIGRAM(S): 100 TABLET, FILM COATED ORAL at 07:03

## 2019-08-03 NOTE — PROGRESS NOTE ADULT - SUBJECTIVE AND OBJECTIVE BOX
SUBJECTIVE: Patient seen and examined no acute distress     Vital Signs Last 24 Hrs  T(C): 36.7 (03 Aug 2019 05:01), Max: 37 (02 Aug 2019 21:16)  T(F): 98 (03 Aug 2019 05:01), Max: 98.6 (02 Aug 2019 21:16)  HR: 67 (03 Aug 2019 05:01) (65 - 74)  BP: 128/73 (03 Aug 2019 05:01) (106/65 - 128/73)  BP(mean): --  RR: 18 (03 Aug 2019 05:01) (18 - 18)  SpO2: 95% (03 Aug 2019 05:01) (95% - 98%)    PHYSICAL EXAM:    Constitutional: No Acute Distress     Neurological: AOx3, Following Commands, Moving all Extremities     Motor exam:          Upper extremity                         Delt     Bicep     Tricep    HG                                                 R         5/5        5/5        5/5       5/5                                               L          5/5        5/5        5/5       5/5          Lower extremity                        HF         KF        KE       DF         PF                                                  R        5/5        5/5        5/5       5/5         5/5                                               L         5/5        5/5       5/5       5/5          5/5                                                 Sensation: [x] intact to light touch  [] decreased:     Pulmonary: Clear to Auscultation, No rales, No rhonchi, No wheezes     Cardiovascular: S1, S2, Regular rate and rhythm     Gastrointestinal: Soft, Non-tender, Non-distended     Extremities: No calf tenderness     Incision: c/d/i   LABS:                        12.0   10.9  )-----------( 255      ( 03 Aug 2019 06:46 )             38.2    08-03    140  |  106  |  34<H>  ----------------------------<  93  5.2   |  24  |  1.12    Ca    8.5      03 Aug 2019 06:46        08-02 @ 07:01  -  08-03 @ 07:00  --------------------------------------------------------  IN: 1240 mL / OUT: 500 mL / NET: 740 mL        MEDICATIONS:  Anticoagulation:   enoxaparin Injectable 30 milliGRAM(s) SubCutaneous at bedtime    Antibiotics:    Endo:  dexamethasone     Tablet 2 milliGRAM(s) Oral every 12 hours    Neuro:  acetaminophen   Tablet .. 650 milliGRAM(s) Oral every 6 hours PRN Temp greater or equal to 38C (100.4F), Mild Pain (1 - 3)  cyclobenzaprine 5 milliGRAM(s) Oral two times a day PRN Muscle Spasm  oxyCODONE    IR 5 milliGRAM(s) Oral every 6 hours PRN Moderate Pain (4 - 6)    Cardiac:  losartan 25 milliGRAM(s) Oral daily    Pulm:    GI/:  docusate sodium 100 milliGRAM(s) Oral three times a day  polyethylene glycol 3350 17 Gram(s) Oral two times a day  senna 2 Tablet(s) Oral at bedtime    Other:   ascorbic acid 500 milliGRAM(s) Oral two times a day  cyanocobalamin 1000 MICROGram(s) Oral daily  folic acid 1 milliGRAM(s) Oral daily  multivitamin 1 Tablet(s) Oral daily    DIET: DASH SUBJECTIVE: Patient seen and examined no acute distress     Vital Signs Last 24 Hrs  T(C): 36.7 (03 Aug 2019 05:01), Max: 37 (02 Aug 2019 21:16)  T(F): 98 (03 Aug 2019 05:01), Max: 98.6 (02 Aug 2019 21:16)  HR: 67 (03 Aug 2019 05:01) (65 - 74)  BP: 128/73 (03 Aug 2019 05:01) (106/65 - 128/73)  BP(mean): --  RR: 18 (03 Aug 2019 05:01) (18 - 18)  SpO2: 95% (03 Aug 2019 05:01) (95% - 98%)    PHYSICAL EXAM:    Constitutional: No Acute Distress     Neurological: AOx3, Following Commands, Moving all Extremities     Motor exam:          Upper extremity                         Delt     Bicep     Tricep    HG                                                 R         5/5        5/5        5/5       5/5                                               L          5/5        5/5        5/5       5/5          Lower extremity                        HF         KF        KE       DF         PF                                                  R        5/5        5/5        5/5       5/5         5/5                                               L         5/5        5/5       5/5       5/5          5/5                                                 Sensation: [x] intact to light touch  [] decreased:     Pulmonary: Clear to Auscultation, No rales, No rhonchi, No wheezes     Cardiovascular: S1, S2, Regular rate and rhythm     Gastrointestinal: Soft, Non-tender, Non-distended     Extremities: No calf tenderness     Incision: c/d/i = steri strips   LABS:                        12.0   10.9  )-----------( 255      ( 03 Aug 2019 06:46 )             38.2    08-03    140  |  106  |  34<H>  ----------------------------<  93  5.2   |  24  |  1.12    Ca    8.5      03 Aug 2019 06:46        08-02 @ 07:01  -  08-03 @ 07:00  --------------------------------------------------------  IN: 1240 mL / OUT: 500 mL / NET: 740 mL        MEDICATIONS:  Anticoagulation:   enoxaparin Injectable 30 milliGRAM(s) SubCutaneous at bedtime    Antibiotics:    Endo:  dexamethasone     Tablet 2 milliGRAM(s) Oral every 12 hours    Neuro:  acetaminophen   Tablet .. 650 milliGRAM(s) Oral every 6 hours PRN Temp greater or equal to 38C (100.4F), Mild Pain (1 - 3)  cyclobenzaprine 5 milliGRAM(s) Oral two times a day PRN Muscle Spasm  oxyCODONE    IR 5 milliGRAM(s) Oral every 6 hours PRN Moderate Pain (4 - 6)    Cardiac:  losartan 25 milliGRAM(s) Oral daily    Pulm:    GI/:  docusate sodium 100 milliGRAM(s) Oral three times a day  polyethylene glycol 3350 17 Gram(s) Oral two times a day  senna 2 Tablet(s) Oral at bedtime    Other:   ascorbic acid 500 milliGRAM(s) Oral two times a day  cyanocobalamin 1000 MICROGram(s) Oral daily  folic acid 1 milliGRAM(s) Oral daily  multivitamin 1 Tablet(s) Oral daily    DIET: DASH

## 2019-08-03 NOTE — PROGRESS NOTE ADULT - ASSESSMENT
HPI: Patient is a 89 year old female with gait and balance instability with difficulty walking.  CT revealed T8 lesion.  Now presented for surgery.    PROCEDURE: 7/30 s/p T7-9 laminectomy for tumor resection; POD#3    PLAN:  - Continue decadron taper decadron 2 q 12 x 5 doses   - Continue oxycodone for pain control; low dose Flexeril for muscle spasm  - Hospitalist medicine following. Resumed lower dose of Losartan given elevated  - Continue colace, senna for bowel regimen  - Encouraged mobilization  - Encouraged incentive spirometer  - DVT prophylaxis: SQL 30 qHS, b/l venodynes  - Dispo: home PT w/ ingrid rolling walker  - Anticipate dc today  - Will discuss with Dr. Peña    Spectralink # [id number]    Assessment:  Please Check When Present   []  GCS  E   V  M     Heart Failure: []Acute, [] acute on chronic , []chronic  Heart Failure:  [] Diastolic (HFpEF), [] Systolic (HFrEF), []Combined (HFpEF and HFrEF), [] RHF, [] Pulm HTN, [] Other    [] SULY, [] ATN, [] AIN, [] other  [] CKD1, [] CKD2, [] CKD 3, [] CKD 4, [] CKD 5, []ESRD    Encephalopathy: [] Metabolic, [] Hepatic, [] toxic, [] Neurological, [] Other    Abnormal Nurtitional Status: [] malnurtition (see nutrition note), [ ]underweight: BMI < 19, [] morbid obesity: BMI >40, [] Cachexia    [] Sepsis  [] hypovolemic shock,[] cardiogenic shock, [] hemorrhagic shock, [] neuogenic shock  [] Acute Respiratory Failure  []Cerebral edema, [] Brain compression/ herniation,   [] Functional quadriplegia  [] Acute blood loss anemia HPI: Patient is a 89 year old female with gait and balance instability with difficulty walking.  CT revealed T8 lesion.  Now presented for surgery.    PROCEDURE: 7/30 s/p T7-9 laminectomy for tumor resection; POD#3    PLAN:  - Continue decadron taper decadron 2 q 12 x 5 doses   - Continue oxycodone for pain control; low dose Flexeril for muscle spasm  - Hospitalist medicine following. Resumed lower dose of Losartan given elevated  - Continue colace, senna for bowel regimen  - Encouraged mobilization  - Encouraged incentive spirometer  - last bm 5 days ago passing gas + bowel sounds and abdomen soft non tender.  dc on stool softners   - DVT prophylaxis: SQL 30 qHS, b/l venodynes  - Dispo: home PT w/ ingrid rolling walker  - Anticipate dc today  - Will discuss with Dr. Casey Fam # [id number]    Assessment:  Please Check When Present   []  GCS  E   V  M     Heart Failure: []Acute, [] acute on chronic , []chronic  Heart Failure:  [] Diastolic (HFpEF), [] Systolic (HFrEF), []Combined (HFpEF and HFrEF), [] RHF, [] Pulm HTN, [] Other    [] SULY, [] ATN, [] AIN, [] other  [] CKD1, [] CKD2, [] CKD 3, [] CKD 4, [] CKD 5, []ESRD    Encephalopathy: [] Metabolic, [] Hepatic, [] toxic, [] Neurological, [] Other    Abnormal Nurtitional Status: [] malnurtition (see nutrition note), [ ]underweight: BMI < 19, [] morbid obesity: BMI >40, [] Cachexia    [] Sepsis  [] hypovolemic shock,[] cardiogenic shock, [] hemorrhagic shock, [] neuogenic shock  [] Acute Respiratory Failure  []Cerebral edema, [] Brain compression/ herniation,   [] Functional quadriplegia  [] Acute blood loss anemia

## 2019-08-05 PROBLEM — S24.113A: Chronic | Status: ACTIVE | Noted: 2019-07-19

## 2019-08-05 PROBLEM — I10 ESSENTIAL (PRIMARY) HYPERTENSION: Chronic | Status: ACTIVE | Noted: 2019-07-19

## 2019-08-05 PROBLEM — S42.401A UNSPECIFIED FRACTURE OF LOWER END OF RIGHT HUMERUS, INITIAL ENCOUNTER FOR CLOSED FRACTURE: Chronic | Status: ACTIVE | Noted: 2019-07-19

## 2019-08-05 PROBLEM — M81.0 AGE-RELATED OSTEOPOROSIS WITHOUT CURRENT PATHOLOGICAL FRACTURE: Chronic | Status: ACTIVE | Noted: 2019-07-19

## 2019-08-09 ENCOUNTER — APPOINTMENT (OUTPATIENT)
Dept: SPINE | Facility: CLINIC | Age: 84
End: 2019-08-09

## 2019-08-09 VITALS
DIASTOLIC BLOOD PRESSURE: 90 MMHG | WEIGHT: 107 LBS | BODY MASS INDEX: 23.08 KG/M2 | TEMPERATURE: 98 F | SYSTOLIC BLOOD PRESSURE: 160 MMHG | HEIGHT: 57 IN

## 2019-08-09 DIAGNOSIS — S24.113A: ICD-10-CM

## 2019-08-09 NOTE — HISTORY OF PRESENT ILLNESS
[FreeTextEntry1] : 89 year old female who presents with a history of left hand numbness and numbness.  A thoracic T 8 lesion was noted.  She was taken to the  OR for a lami and intradural tumor removal. She is now 12 days post op and is home recovering.  pathology shows a meningioma WHO Grade I.  She has minimal pain at the incision site and is able to perform all of her routine activities without bending or lifting.    Her incision is clean and dry with Steri strips intact.  She is taking tylenol for pain. \par

## 2019-08-09 NOTE — REASON FOR VISIT
[Family Member] : family member [de-identified] : T 7 - T 8 lami and removal of intradural tumor  [de-identified] : 7/29/2019

## 2019-08-09 NOTE — PHYSICAL EXAM
[General Appearance - In No Acute Distress] : in no acute distress [General Appearance - Alert] : alert [General Appearance - Well Nourished] : well nourished [General Appearance - Well Developed] : well developed [Clean] : clean [Dry] : dry [Healing Well] : healing well [Intact] : intact [No Drainage] : without drainage [Normal Skin] : normal [Oriented To Time, Place, And Person] : oriented to person, place, and time [Impaired Insight] : insight and judgment were intact [Person] : oriented to person [Affect] : the affect was normal [Place] : oriented to place [Time] : oriented to time [Short Term Intact] : short term memory intact [Remote Intact] : remote memory intact [Involuntary Movements] : no involuntary movements were seen [Registration Intact] : recent registration memory intact [No Visual Abnormalities] : no visible abnormailities [Sclera] : the sclera and conjunctiva were normal [Neck Appearance] : the appearance of the neck was normal [Outer Ear] : the ears and nose were normal in appearance [] : no respiratory distress [Abnormal Walk] : normal gait [Skin Color & Pigmentation] : normal skin color and pigmentation [Erythema] : not erythematous [Tender] : not tender [FreeTextEntry1] : mid back  [Warm] : not warm

## 2019-08-09 NOTE — ASSESSMENT
[FreeTextEntry1] : Assess:\par T 8 lesion \par S/P Thoracic lesion removed and lami T 7 T 8 \par Pathology shows Meningioma WHo Grade 1 \par Steri strips dry and intact\par \par PLAN:\par Return in one month\par Xrays of thoracic region in Sept 2019\par No B L T\par Keep incision dry and intact \par Return for any s/s of infection \par Tylenol for pain \par    \par

## 2019-08-12 ENCOUNTER — MOBILE ON CALL (OUTPATIENT)
Age: 84
End: 2019-08-12

## 2019-09-11 ENCOUNTER — APPOINTMENT (OUTPATIENT)
Dept: PULMONOLOGY | Facility: CLINIC | Age: 84
End: 2019-09-11

## 2019-09-17 ENCOUNTER — APPOINTMENT (OUTPATIENT)
Dept: SPINE | Facility: CLINIC | Age: 84
End: 2019-09-17
Payer: MEDICARE

## 2019-09-17 PROCEDURE — 99024 POSTOP FOLLOW-UP VISIT: CPT

## 2019-10-18 ENCOUNTER — APPOINTMENT (OUTPATIENT)
Dept: PULMONOLOGY | Facility: CLINIC | Age: 84
End: 2019-10-18
Payer: MEDICARE

## 2019-10-18 VITALS
RESPIRATION RATE: 12 BRPM | WEIGHT: 106 LBS | SYSTOLIC BLOOD PRESSURE: 190 MMHG | BODY MASS INDEX: 22.94 KG/M2 | TEMPERATURE: 97.6 F | HEART RATE: 79 BPM | OXYGEN SATURATION: 100 % | DIASTOLIC BLOOD PRESSURE: 98 MMHG

## 2019-10-18 VITALS — DIASTOLIC BLOOD PRESSURE: 80 MMHG | SYSTOLIC BLOOD PRESSURE: 152 MMHG

## 2019-10-18 VITALS — DIASTOLIC BLOOD PRESSURE: 94 MMHG | SYSTOLIC BLOOD PRESSURE: 168 MMHG

## 2019-10-18 PROCEDURE — 99214 OFFICE O/P EST MOD 30 MIN: CPT

## 2019-10-18 NOTE — DISCUSSION/SUMMARY
[FreeTextEntry1] : She is a 89 year-old woman with a history of hypertension. She had been complaining of numbness in her legs and feet. On a contrast enhanced MRI done in June of 2019 she was found to have a mass at T8. Surgical intervention was performed in July 2019. She was found to have a meningioma. She did well and resumed her normal activity. \par \par Her BP is not well controlled. Amlodipine 2.5 mg was added to losartan. Home BP monitoring was advised. \par \par Follow up in one month.

## 2019-10-18 NOTE — REVIEW OF SYSTEMS
[Hypertension] : ~T hypertension [Numbness] : numbness [Memory Loss] : ~T memory lapses or loss [Fatigue] : no fatigue [Fever] : no fever [Nasal Congestion] : no nasal congestion [Epistaxis] : no nosebleeds [Cough] : no cough [Sputum] : not coughing up ~M sputum [Dyspnea] : no dyspnea [Chest Tightness] : no chest tightness [Wheezing] : no wheezing [Chest Discomfort] : no chest discomfort [PND] : no PND [Palpitations] : no palpitations [Edema] : ~T edema was not present [Nasal Discharge] : no nasal discharge [Heartburn] : no heartburn [Indigestion] : no indigestion [Dysuria] : no dysuria [Back Pain] : ~T no back pain [Arthralgias] : no arthralgias [Myalgias] : no myalgias [Rash] : no [unfilled] rash [Anemia] : no anemia [Easy Bruising] : no ~M tendency for easy bruising [Headache] : no headache [Dizziness] : no dizziness [Syncope] : no fainting [Focal Weakness] : no focal weakness [Paralysis] : no paralysis was seen [Seizures] : no seizures [Head Injury] : no head injury [Confusion] : no confusion [Tremor] : ~T no ~M tremor was seen [Involuntary Movements] : ~T no involuntary movements [Anxiety] : no anxiety [Diabetes] : no diabetes mellitus [Thyroid Problem] : no thyroid problem [DVT] : no DVT [Pulmonary Embolism] : no pulmonary embolism [Difficulty Initiating Sleep] : no difficulty falling asleep

## 2019-10-18 NOTE — PHYSICAL EXAM
[Normal Oropharynx] : normal oropharynx [General Appearance - In No Acute Distress] : no acute distress [Neck Appearance] : the appearance of the neck was normal [Jugular Venous Distention Increased] : there was no jugular-venous distention [Thyroid Diffuse Enlargement] : the thyroid was not enlarged [Murmurs] : no murmurs present [Heart Sounds] : normal S1 and S2 [Bowel Sounds] : normal bowel sounds [Auscultation Breath Sounds / Voice Sounds] : lungs were clear to auscultation bilaterally [Abdomen Tenderness] : non-tender [Abdomen Soft] : soft [Abnormal Walk] : normal gait [Nail Clubbing] : no clubbing of the fingernails [Cyanosis, Localized] : no localized cyanosis [] : no rash [Oriented To Time, Place, And Person] : oriented to person, place, and time [No Focal Deficits] : no focal deficits [Impaired Insight] : insight and judgment were intact [Normal Appearance] : normal appearance [Erythema] : no erythema of the pharynx [FreeTextEntry1] : Crepitations noted in the right shoulder with movement. Skin intact. No swelling. No redness.

## 2019-10-18 NOTE — HISTORY OF PRESENT ILLNESS
[FreeTextEntry1] : Has surgery on her back in July. Did well. \par \par Feels well. She is more active. \par \par Losartan was lowered to 50 mg. Found to have elevated BP. Advised to see PCP. \par \par Has been taking losartan 100 mg for a few days. \par

## 2019-12-04 ENCOUNTER — APPOINTMENT (OUTPATIENT)
Dept: PULMONOLOGY | Facility: CLINIC | Age: 84
End: 2019-12-04
Payer: MEDICARE

## 2019-12-04 VITALS — SYSTOLIC BLOOD PRESSURE: 148 MMHG | DIASTOLIC BLOOD PRESSURE: 78 MMHG

## 2019-12-04 VITALS
DIASTOLIC BLOOD PRESSURE: 88 MMHG | TEMPERATURE: 97.5 F | HEART RATE: 68 BPM | SYSTOLIC BLOOD PRESSURE: 180 MMHG | BODY MASS INDEX: 22.94 KG/M2 | RESPIRATION RATE: 12 BRPM | WEIGHT: 106 LBS | OXYGEN SATURATION: 100 %

## 2019-12-04 VITALS — DIASTOLIC BLOOD PRESSURE: 82 MMHG | SYSTOLIC BLOOD PRESSURE: 158 MMHG

## 2019-12-04 PROCEDURE — 99214 OFFICE O/P EST MOD 30 MIN: CPT

## 2019-12-04 NOTE — PHYSICAL EXAM
[General Appearance - In No Acute Distress] : no acute distress [Normal Oropharynx] : normal oropharynx [Jugular Venous Distention Increased] : there was no jugular-venous distention [Neck Appearance] : the appearance of the neck was normal [Murmurs] : no murmurs present [Thyroid Diffuse Enlargement] : the thyroid was not enlarged [Heart Sounds] : normal S1 and S2 [Abdomen Soft] : soft [Bowel Sounds] : normal bowel sounds [Auscultation Breath Sounds / Voice Sounds] : lungs were clear to auscultation bilaterally [Abnormal Walk] : normal gait [Abdomen Tenderness] : non-tender [Cyanosis, Localized] : no localized cyanosis [Nail Clubbing] : no clubbing of the fingernails [No Focal Deficits] : no focal deficits [] : no rash [Impaired Insight] : insight and judgment were intact [Oriented To Time, Place, And Person] : oriented to person, place, and time [Normal Appearance] : normal appearance [Erythema] : no erythema of the pharynx [FreeTextEntry1] : Crepitations noted in the right shoulder with movement. Skin intact. No swelling. No redness.

## 2019-12-04 NOTE — DISCUSSION/SUMMARY
[FreeTextEntry1] : She is a 89 year-old woman with a history of hypertension. She had been complaining of numbness in her legs and feet. On a contrast enhanced MRI done in June of 2019 she was found to have a mass at T8. Surgical intervention was performed in July 2019. She was found to have a meningioma. She did well and resumed her normal activity. \par \par She remains active. Her BP is better controlled. Advised to continue with her current medications.  \par \par Follow up in three months.

## 2019-12-04 NOTE — HISTORY OF PRESENT ILLNESS
[FreeTextEntry1] : She came for a follow up today. Has been following her BP with a home monitor. Brought her recordings. \par \par Has been taking losartan 100 mg.

## 2020-01-10 ENCOUNTER — RX RENEWAL (OUTPATIENT)
Age: 85
End: 2020-01-10

## 2020-01-13 ENCOUNTER — RX RENEWAL (OUTPATIENT)
Age: 85
End: 2020-01-13

## 2020-08-31 ENCOUNTER — APPOINTMENT (OUTPATIENT)
Dept: PULMONOLOGY | Facility: CLINIC | Age: 85
End: 2020-08-31
Payer: MEDICARE

## 2020-08-31 VITALS
HEART RATE: 73 BPM | SYSTOLIC BLOOD PRESSURE: 141 MMHG | BODY MASS INDEX: 21.64 KG/M2 | TEMPERATURE: 97.9 F | OXYGEN SATURATION: 100 % | RESPIRATION RATE: 16 BRPM | DIASTOLIC BLOOD PRESSURE: 81 MMHG | WEIGHT: 100 LBS

## 2020-08-31 DIAGNOSIS — M53.9 DORSOPATHY, UNSPECIFIED: ICD-10-CM

## 2020-08-31 PROCEDURE — 90662 IIV NO PRSV INCREASED AG IM: CPT

## 2020-08-31 PROCEDURE — G0008: CPT

## 2020-08-31 PROCEDURE — 36415 COLL VENOUS BLD VENIPUNCTURE: CPT

## 2020-08-31 PROCEDURE — 99215 OFFICE O/P EST HI 40 MIN: CPT | Mod: 25

## 2020-08-31 RX ORDER — AMLODIPINE BESYLATE 2.5 MG/1
2.5 TABLET ORAL
Qty: 90 | Refills: 0 | Status: DISCONTINUED | COMMUNITY
Start: 2019-10-18 | End: 2020-08-31

## 2020-08-31 NOTE — REVIEW OF SYSTEMS
[Hypertension] : ~T hypertension [Numbness] : numbness [Memory Loss] : ~T memory lapses or loss [Fever] : no fever [Fatigue] : no fatigue [Nasal Congestion] : no nasal congestion [Cough] : no cough [Epistaxis] : no nosebleeds [Dyspnea] : no dyspnea [Wheezing] : no wheezing [Hypotension] : no hypotension [PND] : no PND [Chest Discomfort] : no chest discomfort [Edema] : ~T edema was not present [Palpitations] : no palpitations [Nasal Discharge] : no nasal discharge [Heartburn] : no heartburn [Indigestion] : no indigestion [Dysuria] : no dysuria [Myalgias] : no myalgias [Back Pain] : ~T no back pain [Arthralgias] : no arthralgias [Rash] : no [unfilled] rash [Anemia] : no anemia [Easy Bruising] : no ~M tendency for easy bruising [Headache] : no headache [Dizziness] : no dizziness [Syncope] : no fainting [Focal Weakness] : no focal weakness [Paralysis] : no paralysis was seen [Seizures] : no seizures [Confusion] : no confusion [Head Injury] : no head injury [Involuntary Movements] : ~T no involuntary movements [Tremor] : ~T no ~M tremor was seen [Anxiety] : no anxiety [Diabetes] : no diabetes mellitus [Thyroid Problem] : no thyroid problem [DVT] : no DVT [Difficulty Initiating Sleep] : no difficulty falling asleep [Pulmonary Embolism] : no pulmonary embolism

## 2020-08-31 NOTE — HISTORY OF PRESENT ILLNESS
[Never] : never [FreeTextEntry1] : She came for a follow up today. \par \par Feeling well except for the neuropathic pain in her feet. She had been monitoring her BP with a home monitor. Has been doing well. Has been taking losartan 100 mg. Has missed some doses.

## 2020-08-31 NOTE — PHYSICAL EXAM
[General Appearance - In No Acute Distress] : no acute distress [Normal Oropharynx] : normal oropharynx [Neck Appearance] : the appearance of the neck was normal [Jugular Venous Distention Increased] : there was no jugular-venous distention [Thyroid Diffuse Enlargement] : the thyroid was not enlarged [Murmurs] : no murmurs present [Heart Sounds] : normal S1 and S2 [Auscultation Breath Sounds / Voice Sounds] : lungs were clear to auscultation bilaterally [Abdomen Soft] : soft [Abdomen Tenderness] : non-tender [Abnormal Walk] : normal gait [Cyanosis, Localized] : no localized cyanosis [No Focal Deficits] : no focal deficits [Oriented To Time, Place, And Person] : oriented to person, place, and time [Impaired Insight] : insight and judgment were intact [] : no respiratory distress [Skin Turgor] : normal skin turgor [Erythema] : no erythema of the pharynx [FreeTextEntry1] : Crepitations noted in the right shoulder with movement. Skin intact. No swelling. No redness.

## 2020-08-31 NOTE — DISCUSSION/SUMMARY
[FreeTextEntry1] : She is a 89 year-old woman with a history of hypertension. She had been complaining of numbness in her legs and feet. On a contrast enhanced MRI done in June of 2019 she was found to have a mass at T8. Surgical intervention was performed in July 2019. She was found to have a meningioma. She did well and resumed her normal activity except she still has a neuropathy in her feet. \par \par Her blood pressure is well controlled. Blood work obtained. \par \par Will inform Janiya  (daughter in law) of the blood test results. \par \par High dose influenza vaccination given. \par \par Follow up in six months.

## 2020-09-01 LAB
25(OH)D3 SERPL-MCNC: 54.5 NG/ML
ALBUMIN SERPL ELPH-MCNC: 4.6 G/DL
ALP BLD-CCNC: 61 U/L
ALT SERPL-CCNC: 11 U/L
ANION GAP SERPL CALC-SCNC: 14 MMOL/L
AST SERPL-CCNC: 15 U/L
BASOPHILS # BLD AUTO: 0.04 K/UL
BASOPHILS NFR BLD AUTO: 0.4 %
BILIRUB SERPL-MCNC: 0.2 MG/DL
BUN SERPL-MCNC: 26 MG/DL
CALCIUM SERPL-MCNC: 9.9 MG/DL
CHLORIDE SERPL-SCNC: 104 MMOL/L
CO2 SERPL-SCNC: 23 MMOL/L
CREAT SERPL-MCNC: 1.25 MG/DL
EOSINOPHIL # BLD AUTO: 0.03 K/UL
EOSINOPHIL NFR BLD AUTO: 0.3 %
FOLATE SERPL-MCNC: 8.9 NG/ML
GLUCOSE SERPL-MCNC: 99 MG/DL
HCT VFR BLD CALC: 42.4 %
HGB BLD-MCNC: 12.6 G/DL
IMM GRANULOCYTES NFR BLD AUTO: 0.2 %
LYMPHOCYTES # BLD AUTO: 2.59 K/UL
LYMPHOCYTES NFR BLD AUTO: 26.3 %
MAN DIFF?: NORMAL
MCHC RBC-ENTMCNC: 29.3 PG
MCHC RBC-ENTMCNC: 29.7 GM/DL
MCV RBC AUTO: 98.6 FL
MONOCYTES # BLD AUTO: 0.47 K/UL
MONOCYTES NFR BLD AUTO: 4.8 %
NEUTROPHILS # BLD AUTO: 6.69 K/UL
NEUTROPHILS NFR BLD AUTO: 68 %
PLATELET # BLD AUTO: 289 K/UL
POTASSIUM SERPL-SCNC: 4.8 MMOL/L
PROT SERPL-MCNC: 7.1 G/DL
RBC # BLD: 4.3 M/UL
RBC # FLD: 13.8 %
SODIUM SERPL-SCNC: 141 MMOL/L
TSH SERPL-ACNC: 2.6 UIU/ML
VIT B12 SERPL-MCNC: >2000 PG/ML
WBC # FLD AUTO: 9.84 K/UL

## 2020-12-18 ENCOUNTER — RX RENEWAL (OUTPATIENT)
Age: 85
End: 2020-12-18

## 2021-03-17 ENCOUNTER — APPOINTMENT (OUTPATIENT)
Dept: PULMONOLOGY | Facility: CLINIC | Age: 86
End: 2021-03-17
Payer: MEDICARE

## 2021-03-17 VITALS
SYSTOLIC BLOOD PRESSURE: 140 MMHG | OXYGEN SATURATION: 99 % | BODY MASS INDEX: 23.59 KG/M2 | DIASTOLIC BLOOD PRESSURE: 80 MMHG | RESPIRATION RATE: 16 BRPM | HEART RATE: 75 BPM | WEIGHT: 109 LBS | TEMPERATURE: 97 F

## 2021-03-17 PROCEDURE — 99213 OFFICE O/P EST LOW 20 MIN: CPT | Mod: 25

## 2021-03-17 PROCEDURE — 36415 COLL VENOUS BLD VENIPUNCTURE: CPT

## 2021-03-17 NOTE — REVIEW OF SYSTEMS
[Hypertension] : ~T hypertension [Numbness] : numbness [Memory Loss] : ~T memory lapses or loss [Fever] : no fever [Fatigue] : no fatigue [Nasal Congestion] : no nasal congestion [Epistaxis] : no nosebleeds [Cough] : no cough [Wheezing] : no wheezing [Hypotension] : no hypotension [Chest Discomfort] : no chest discomfort [PND] : no PND [Palpitations] : no palpitations [Edema] : ~T edema was not present [Nasal Discharge] : no nasal discharge [Heartburn] : no heartburn [Indigestion] : no indigestion [Dysuria] : no dysuria [Back Pain] : ~T no back pain [Myalgias] : no myalgias [Arthralgias] : no arthralgias [Rash] : no [unfilled] rash [Anemia] : no anemia [Easy Bruising] : no ~M tendency for easy bruising [Headache] : no headache [Dizziness] : no dizziness [Syncope] : no fainting [Focal Weakness] : no focal weakness [Paralysis] : no paralysis was seen [Seizures] : no seizures [Confusion] : no confusion [Head Injury] : no head injury [Involuntary Movements] : ~T no involuntary movements [Tremor] : ~T no ~M tremor was seen [Anxiety] : no anxiety [Diabetes] : no diabetes mellitus [Thyroid Problem] : no thyroid problem [DVT] : no DVT [Pulmonary Embolism] : no pulmonary embolism [Difficulty Initiating Sleep] : no difficulty falling asleep

## 2021-03-17 NOTE — PHYSICAL EXAM
[General Appearance - In No Acute Distress] : no acute distress [Thyroid Diffuse Enlargement] : the thyroid was not enlarged [Heart Sounds] : normal S1 and S2 [Murmurs] : no murmurs present [Auscultation Breath Sounds / Voice Sounds] : lungs were clear to auscultation bilaterally [Abdomen Tenderness] : non-tender [] : no hepato-splenomegaly [Abnormal Walk] : normal gait [Cyanosis, Localized] : no localized cyanosis [Skin Turgor] : normal skin turgor [No Focal Deficits] : no focal deficits [Oriented To Time, Place, And Person] : oriented to person, place, and time [Impaired Insight] : insight and judgment were intact [Neck Cervical Mass (___cm)] : no neck mass was observed [FreeTextEntry1] : Crepitations noted in the right shoulder with movement. Skin intact. No swelling. No redness.

## 2021-03-17 NOTE — HISTORY OF PRESENT ILLNESS
[Never] : never [TextBox_4] : She is feeling well except for the neuropathic pain in her feet. \par \par Has been taking losartan 100 mg. Has missed some doses.

## 2021-03-17 NOTE — DISCUSSION/SUMMARY
[FreeTextEntry1] : She is a 89 year-old woman with a history of hypertension. She had been complaining of numbness in her legs and feet. MRI done in June of 2019 showed a mass at T8. Surgical intervention was performed in July 2019. She was found to have a meningioma. She did well and resumed her normal activity except she still has a neuropathy in her feet. \par \par Her blood pressure is well controlled. Medical status is stable. Does not want any routine screening. \par \par Will inform Janiya  (daughter in law) of the blood test results. \par \par Recieved the COVID 19 vaccine. \par \par Follow up in 6 months.

## 2021-03-18 LAB
25(OH)D3 SERPL-MCNC: 43.4 NG/ML
ALBUMIN SERPL ELPH-MCNC: 4.4 G/DL
ALP BLD-CCNC: 79 U/L
ALT SERPL-CCNC: 8 U/L
ANION GAP SERPL CALC-SCNC: 13 MMOL/L
AST SERPL-CCNC: 15 U/L
BASOPHILS # BLD AUTO: 0.03 K/UL
BASOPHILS NFR BLD AUTO: 0.3 %
BILIRUB SERPL-MCNC: 0.2 MG/DL
BUN SERPL-MCNC: 32 MG/DL
CALCIUM SERPL-MCNC: 9.9 MG/DL
CHLORIDE SERPL-SCNC: 107 MMOL/L
CO2 SERPL-SCNC: 23 MMOL/L
CREAT SERPL-MCNC: 1.12 MG/DL
EOSINOPHIL # BLD AUTO: 0.05 K/UL
EOSINOPHIL NFR BLD AUTO: 0.6 %
GLUCOSE SERPL-MCNC: 93 MG/DL
HCT VFR BLD CALC: 43.7 %
HGB BLD-MCNC: 13.2 G/DL
IMM GRANULOCYTES NFR BLD AUTO: 0.5 %
LYMPHOCYTES # BLD AUTO: 1.87 K/UL
LYMPHOCYTES NFR BLD AUTO: 21.7 %
MAN DIFF?: NORMAL
MCHC RBC-ENTMCNC: 29.8 PG
MCHC RBC-ENTMCNC: 30.2 GM/DL
MCV RBC AUTO: 98.6 FL
MONOCYTES # BLD AUTO: 0.45 K/UL
MONOCYTES NFR BLD AUTO: 5.2 %
NEUTROPHILS # BLD AUTO: 6.17 K/UL
NEUTROPHILS NFR BLD AUTO: 71.7 %
PLATELET # BLD AUTO: 313 K/UL
POTASSIUM SERPL-SCNC: 4.9 MMOL/L
PROT SERPL-MCNC: 7.3 G/DL
RBC # BLD: 4.43 M/UL
RBC # FLD: 14.3 %
SODIUM SERPL-SCNC: 142 MMOL/L
WBC # FLD AUTO: 8.61 K/UL

## 2021-05-10 ENCOUNTER — RX RENEWAL (OUTPATIENT)
Age: 86
End: 2021-05-10

## 2021-09-22 ENCOUNTER — APPOINTMENT (OUTPATIENT)
Dept: PULMONOLOGY | Facility: CLINIC | Age: 86
End: 2021-09-22
Payer: MEDICARE

## 2021-09-22 VITALS
WEIGHT: 100 LBS | SYSTOLIC BLOOD PRESSURE: 152 MMHG | TEMPERATURE: 96.2 F | BODY MASS INDEX: 21.64 KG/M2 | HEART RATE: 72 BPM | OXYGEN SATURATION: 98 % | DIASTOLIC BLOOD PRESSURE: 78 MMHG

## 2021-09-22 VITALS — SYSTOLIC BLOOD PRESSURE: 148 MMHG | DIASTOLIC BLOOD PRESSURE: 78 MMHG

## 2021-09-22 DIAGNOSIS — Z23 ENCOUNTER FOR IMMUNIZATION: ICD-10-CM

## 2021-09-22 PROCEDURE — 99213 OFFICE O/P EST LOW 20 MIN: CPT | Mod: 25

## 2021-09-22 PROCEDURE — G0008: CPT

## 2021-09-22 PROCEDURE — 90662 IIV NO PRSV INCREASED AG IM: CPT
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
## 2021-09-22 NOTE — DISCUSSION/SUMMARY
[FreeTextEntry1] : She is a 89 year-old woman with a history of hypertension. She had been complaining of numbness in her legs and feet. MRI done in June of 2019 showed a mass at T8. Surgical intervention was performed in July 2019. She was found to have a meningioma. She did well and resumed her normal activity except she still has a neuropathy in her feet. \par \par Her BP control is adequate (on her home monitor).  \par \par Influenza vaccination given. \par \par Follow up in 6 months.

## 2021-09-22 NOTE — REVIEW OF SYSTEMS
[Hypertension] : ~T hypertension [Numbness] : numbness [Memory Loss] : ~T memory lapses or loss [Fever] : no fever [Nasal Congestion] : no nasal congestion [Cough] : no cough [Dyspnea] : no dyspnea [Chest Tightness] : no chest tightness [Wheezing] : no wheezing [Palpitations] : no palpitations [Edema] : ~T edema was not present [Nasal Discharge] : no nasal discharge [Heartburn] : no heartburn [Indigestion] : no indigestion [Dysuria] : no dysuria [Back Pain] : ~T no back pain [Myalgias] : no myalgias [Arthralgias] : no arthralgias [Rash] : no [unfilled] rash [Anemia] : no anemia [Easy Bruising] : no ~M tendency for easy bruising [Headache] : no headache [Dizziness] : no dizziness [Syncope] : no fainting [Focal Weakness] : no focal weakness [Paralysis] : no paralysis was seen [Seizures] : no seizures [Confusion] : no confusion [Head Injury] : no head injury [Involuntary Movements] : ~T no involuntary movements [Tremor] : ~T no ~M tremor was seen [Anxiety] : no anxiety [Diabetes] : no diabetes mellitus [Thyroid Problem] : no thyroid problem [DVT] : no DVT [Pulmonary Embolism] : no pulmonary embolism [Difficulty Initiating Sleep] : no difficulty falling asleep

## 2021-09-22 NOTE — PHYSICAL EXAM
[General Appearance - In No Acute Distress] : no acute distress [Neck Cervical Mass (___cm)] : no neck mass was observed [Thyroid Diffuse Enlargement] : the thyroid was not enlarged [Heart Sounds] : normal S1 and S2 [Murmurs] : no murmurs present [Auscultation Breath Sounds / Voice Sounds] : lungs were clear to auscultation bilaterally [] : no hepato-splenomegaly [Abnormal Walk] : normal gait [Cyanosis, Localized] : no localized cyanosis [Skin Turgor] : normal skin turgor [No Focal Deficits] : no focal deficits [Oriented To Time, Place, And Person] : oriented to person, place, and time [FreeTextEntry1] : Crepitations noted in the right shoulder with movement. Skin intact. No swelling. No redness.

## 2021-09-23 ENCOUNTER — MED ADMIN CHARGE (OUTPATIENT)
Age: 86
End: 2021-09-23

## 2022-01-18 ENCOUNTER — RX RENEWAL (OUTPATIENT)
Age: 87
End: 2022-01-18

## 2022-03-23 ENCOUNTER — APPOINTMENT (OUTPATIENT)
Dept: PULMONOLOGY | Facility: CLINIC | Age: 87
End: 2022-03-23
Payer: MEDICARE

## 2022-03-23 VITALS
DIASTOLIC BLOOD PRESSURE: 82 MMHG | SYSTOLIC BLOOD PRESSURE: 138 MMHG | HEART RATE: 89 BPM | BODY MASS INDEX: 22.29 KG/M2 | TEMPERATURE: 96.2 F | WEIGHT: 103 LBS | OXYGEN SATURATION: 96 %

## 2022-03-23 PROCEDURE — 99214 OFFICE O/P EST MOD 30 MIN: CPT | Mod: 25

## 2022-03-23 PROCEDURE — 36415 COLL VENOUS BLD VENIPUNCTURE: CPT

## 2022-03-23 NOTE — REVIEW OF SYSTEMS
[Hypertension] : ~T hypertension [Numbness] : numbness [Memory Loss] : ~T memory lapses or loss [Fever] : no fever [Nasal Congestion] : no nasal congestion [Cough] : no cough [Dyspnea] : no dyspnea [Chest Tightness] : no chest tightness [Wheezing] : no wheezing [Palpitations] : no palpitations [Edema] : ~T edema was not present [Nasal Discharge] : no nasal discharge [Heartburn] : no heartburn [Indigestion] : no indigestion [Dysuria] : no dysuria [Back Pain] : ~T no back pain [Myalgias] : no myalgias [Arthralgias] : no arthralgias [Rash] : no [unfilled] rash [Anemia] : no anemia [Easy Bruising] : no ~M tendency for easy bruising [Headache] : no headache [Dizziness] : no dizziness [Syncope] : no fainting [Focal Weakness] : no focal weakness [Paralysis] : no paralysis was seen [Seizures] : no seizures [Confusion] : no confusion [Head Injury] : no head injury [Involuntary Movements] : ~T no involuntary movements [Tremor] : ~T no ~M tremor was seen [Anxiety] : no anxiety [Diabetes] : no diabetes mellitus [Thyroid Problem] : no thyroid problem [DVT] : no DVT [Pulmonary Embolism] : no pulmonary embolism

## 2022-03-23 NOTE — HISTORY OF PRESENT ILLNESS
[Never] : never [TextBox_4] : She is feeling well. \par \par She still lives alone. \par \par Fully vaccinated for COVID 19 including a booster. \par \par \par \par  [Difficulty Initiating Sleep] : does not have difficulty initiating sleep [Difficulty Maintaining Sleep] : does not have difficulty maintaining sleep

## 2022-03-23 NOTE — DISCUSSION/SUMMARY
[FreeTextEntry1] : She is a 89 year-old woman with a history of hypertension. She had been complaining of numbness in her legs and feet. MRI done in June of 2019 showed a mass at T8. Surgical intervention was performed in July 2019. She was found to have a meningioma. She did well and resumed her normal activity except she still has a neuropathy in her feet. \par \par Her blood pressure is controlled. She does not want to any routine screening such as GI, GYN and bone density. \par \par Blood work obtained. \par \par Follow up in 6 months.

## 2022-03-30 ENCOUNTER — NON-APPOINTMENT (OUTPATIENT)
Age: 87
End: 2022-03-30

## 2022-03-30 LAB
25(OH)D3 SERPL-MCNC: 45.8 NG/ML
ALBUMIN SERPL ELPH-MCNC: 4.3 G/DL
ALP BLD-CCNC: 82 U/L
ALT SERPL-CCNC: 12 U/L
ANION GAP SERPL CALC-SCNC: 12 MMOL/L
AST SERPL-CCNC: 14 U/L
BASOPHILS # BLD AUTO: 0.04 K/UL
BASOPHILS NFR BLD AUTO: 0.4 %
BILIRUB SERPL-MCNC: 0.3 MG/DL
BUN SERPL-MCNC: 32 MG/DL
CALCIUM SERPL-MCNC: 9.5 MG/DL
CHLORIDE SERPL-SCNC: 106 MMOL/L
CHOLEST SERPL-MCNC: 184 MG/DL
CO2 SERPL-SCNC: 24 MMOL/L
CREAT SERPL-MCNC: 1.26 MG/DL
EGFR: 40 ML/MIN/1.73M2
EOSINOPHIL # BLD AUTO: 0.05 K/UL
EOSINOPHIL NFR BLD AUTO: 0.5 %
ESTIMATED AVERAGE GLUCOSE: 114 MG/DL
GLUCOSE SERPL-MCNC: 87 MG/DL
HBA1C MFR BLD HPLC: 5.6 %
HCT VFR BLD CALC: 43.9 %
HDLC SERPL-MCNC: 51 MG/DL
HGB BLD-MCNC: 13.5 G/DL
IMM GRANULOCYTES NFR BLD AUTO: 0.2 %
LDLC SERPL CALC-MCNC: 113 MG/DL
LYMPHOCYTES # BLD AUTO: 1.97 K/UL
LYMPHOCYTES NFR BLD AUTO: 21.4 %
MAN DIFF?: NORMAL
MCHC RBC-ENTMCNC: 29.9 PG
MCHC RBC-ENTMCNC: 30.8 GM/DL
MCV RBC AUTO: 97.3 FL
MONOCYTES # BLD AUTO: 0.41 K/UL
MONOCYTES NFR BLD AUTO: 4.5 %
NEUTROPHILS # BLD AUTO: 6.72 K/UL
NEUTROPHILS NFR BLD AUTO: 73 %
NONHDLC SERPL-MCNC: 133 MG/DL
PLATELET # BLD AUTO: 296 K/UL
POTASSIUM SERPL-SCNC: 5.5 MMOL/L
PROT SERPL-MCNC: 7.2 G/DL
RBC # BLD: 4.51 M/UL
RBC # FLD: 14.1 %
SODIUM SERPL-SCNC: 142 MMOL/L
TRIGL SERPL-MCNC: 98 MG/DL
WBC # FLD AUTO: 9.21 K/UL

## 2022-04-19 NOTE — REVIEW OF SYSTEMS
Chief complaint:   Chief Complaint   Patient presents with   • Cough       Vitals:  Visit Vitals  /70   Pulse 85   Resp 15   Ht 5' 6\" (1.676 m)   Wt 85.5 kg (188 lb 8 oz)   LMP 2021   BMI 30.42 kg/m²       HISTORY OF PRESENT ILLNESS     Patient has been having cold like symptoms for about 6 days with deep nonproductive cough, wheezing when taking a deep breath, rib and chest pain with cough. Denies blood in sputum.      Per OB/Gyne patient to take OTC medications, patient has tried Mucinex and Dayquil with no relief.      Patient is 32 weeks pregnant, due -  with her 2nd child. Sees OB at [facility]. Has been feeling baby move today.      He denies patient having any fevers, vaginal bleeding, spotting, no shortness of breath, nausea, vomiting or diarrhea.     Home Covid test was negative.      Other significant problems:  Patient Active Problem List    Diagnosis Date Noted   • Chronic constipation 2019     Priority: Low       PAST MEDICAL, FAMILY AND SOCIAL HISTORY     Medications:  Current Outpatient Medications   Medication   • ASPIRIN PO   • Prenatal Multivit-Min-Fe-FA (PRENATAL VITAMINS PO)   • ipratropium (ATROVENT HFA) 17 MCG/ACT inhaler   • Progesterone 200 MG Cap   • azelastine (OPTIVAR) 0.05 % ophthalmic solution   • acetaminophen-codeine (TYLENOL NO.3) 300-30 MG per tablet   • fluticasone (FLONASE) 50 MCG/ACT nasal spray   • levocetirizine (Xyzal Allergy 24HR) 5 MG tablet   • Cyanocobalamin (VITAMIN B12 PO)   • Probiotic Product (PROBIOTIC DAILY PO)   • linaclotide (LINZESS) 145 MCG capsule   • GIANVI 3-0.02 MG per tablet     No current facility-administered medications for this visit.       Allergies:  ALLERGIES:   Allergen Reactions   • Benzocaine HIVES   • Sulfamethoxazole-Trimethoprim RASH       Past Medical  History/Surgeries:  Past Medical History:   Diagnosis Date   • Chronic constipation        Past Surgical History:   Procedure Laterality Date   •   section, low transverse     • Colonoscopy         Family History:  Family History   Problem Relation Age of Onset   • Patient is unaware of any medical problems Mother    • Heart Father        Social History:  Social History     Tobacco Use   • Smoking status: Never Smoker   • Smokeless tobacco: Never Used   Substance Use Topics   • Alcohol use: Not on file       REVIEW OF SYSTEMS     Review of Systems   Constitutional: Positive for fatigue. Negative for fever.   HENT: Positive for congestion, postnasal drip, sneezing and sore throat. Negative for voice change.    Eyes: Negative.    Respiratory: Positive for cough, chest tightness and wheezing. Negative for shortness of breath.    Cardiovascular: Negative.    Gastrointestinal: Negative.    Endocrine: Negative.    Genitourinary: Negative.    Musculoskeletal: Negative.    Skin: Negative.    Allergic/Immunologic: Negative.    Neurological: Negative.    Hematological: Negative.    Psychiatric/Behavioral: Negative.    All other systems reviewed and are negative.      PHYSICAL EXAM     Physical Exam  Vitals and nursing note reviewed. Chaperone present: accompanied by .   Constitutional:       Appearance: Normal appearance.      Comments: Pleasant, pregnant   HENT:      Head: Normocephalic and atraumatic.      Ears:      Comments: Slight effusion bilateral TM     Nose: Congestion and rhinorrhea present.      Mouth/Throat:      Mouth: Mucous membranes are dry.      Pharynx: Oropharynx is clear.      Comments: Cobblestoning     Neck: Normal range of motion and neck supple.   Eyes:      Extraocular Movements: Extraocular movements intact.      Conjunctiva/sclera: Conjunctivae normal.      Pupils: Pupils are equal, round, and reactive to light.   Cardiovascular:      Rate and Rhythm: Normal rate and regular rhythm.      Pulses: Normal pulses.      Heart sounds: Normal heart sounds.   Pulmonary:      Effort: Pulmonary effort is normal.      Breath sounds: Wheezing  (Expiratory in RUQ - resolved after coughing) present.   Abdominal:      Comments: Rounded - pregnant   Musculoskeletal:         General: Normal range of motion.   Skin:     General: Skin is warm and dry.      Capillary Refill: Capillary refill takes less than 2 seconds.   Neurological:      General: No focal deficit present.      Mental Status: She is alert and oriented to person, place, and time.   Psychiatric:         Mood and Affect: Mood normal.         Behavior: Behavior normal.         Thought Content: Thought content normal.         Judgment: Judgment normal.         ASSESSMENT/PLAN     (J06.9) Viral URI  (primary encounter diagnosis)  -Continue supportive measures.   -Recommend cool mist humidifier.   -Increase fluid intake to help thin secretions and stay hydrated. Hot tea with honey may help to soothe sore throat.   -Recommend Neti pot, saline nasal spray, or saline rinse to help with nasal and eustachian tube congestion.     (R05.3) Persistent dry cough  -Start Atrovent inhaler, 2 puffs BID, up to TID. Limit use to 4-5 days.  -Use Robitussin for expectorant.     (R09.81,  J34.89) Nasal congestion with rhinorrhea  --Recommend cool mist humidifier.   -Increase fluid intake to help thin secretions and stay hydrated. Hot tea with honey may help to soothe sore throat.   -Recommend Neti pot, saline nasal spray, or saline rinse to help with nasal and eustachian tube congestion.     (R06.2) Wheezing on auscultation  -Expiratory wheeze in the RUQ, resolved with coughing.   -If wheezing worsens and/or does not go away with cough along with any fevers, may need chest XR. Because it went away with expelling mucous, deferred today.    (Z3A.32) 32 weeks gestation of pregnancy  -Continue to follow with OB at [facility] as scheduled.      Return to clinic for any fevers, worsening symptoms.      [Hypertension] : ~T hypertension [Numbness] : numbness [Memory Loss] : ~T memory lapses or loss [Fever] : no fever [Fatigue] : no fatigue [Nasal Congestion] : no nasal congestion [Epistaxis] : no nosebleeds [Cough] : no cough [Dyspnea] : no dyspnea [Wheezing] : no wheezing [PND] : no PND [Chest Discomfort] : no chest discomfort [Palpitations] : no palpitations [Edema] : ~T edema was not present [Nasal Discharge] : no nasal discharge [Heartburn] : no heartburn [Dysuria] : no dysuria [Indigestion] : no indigestion [Myalgias] : no myalgias [Back Pain] : ~T no back pain [Arthralgias] : no arthralgias [Rash] : no [unfilled] rash [Anemia] : no anemia [Easy Bruising] : no ~M tendency for easy bruising [Headache] : no headache [Dizziness] : no dizziness [Syncope] : no fainting [Focal Weakness] : no focal weakness [Paralysis] : no paralysis was seen [Seizures] : no seizures [Confusion] : no confusion [Head Injury] : no head injury [Involuntary Movements] : ~T no involuntary movements [Tremor] : ~T no ~M tremor was seen [Anxiety] : no anxiety [Thyroid Problem] : no thyroid problem [DVT] : no DVT [Diabetes] : no diabetes mellitus [Pulmonary Embolism] : no pulmonary embolism [Difficulty Initiating Sleep] : no difficulty falling asleep

## 2022-04-22 ENCOUNTER — INPATIENT (INPATIENT)
Facility: HOSPITAL | Age: 87
LOS: 6 days | Discharge: NURSING, REHAB OR LONG-TERM CARE | DRG: 480 | End: 2022-04-29
Attending: HOSPITALIST | Admitting: STUDENT IN AN ORGANIZED HEALTH CARE EDUCATION/TRAINING PROGRAM
Payer: MEDICARE

## 2022-04-22 ENCOUNTER — TRANSCRIPTION ENCOUNTER (OUTPATIENT)
Age: 87
End: 2022-04-22

## 2022-04-22 VITALS
HEIGHT: 60 IN | SYSTOLIC BLOOD PRESSURE: 180 MMHG | TEMPERATURE: 98 F | HEART RATE: 66 BPM | DIASTOLIC BLOOD PRESSURE: 83 MMHG | WEIGHT: 104.94 LBS | OXYGEN SATURATION: 99 % | RESPIRATION RATE: 18 BRPM

## 2022-04-22 DIAGNOSIS — S72.113A DISPLACED FRACTURE OF GREATER TROCHANTER OF UNSPECIFIED FEMUR, INITIAL ENCOUNTER FOR CLOSED FRACTURE: ICD-10-CM

## 2022-04-22 DIAGNOSIS — Z02.9 ENCOUNTER FOR ADMINISTRATIVE EXAMINATIONS, UNSPECIFIED: ICD-10-CM

## 2022-04-22 DIAGNOSIS — N18.30 CHRONIC KIDNEY DISEASE, STAGE 3 UNSPECIFIED: ICD-10-CM

## 2022-04-22 DIAGNOSIS — I10 ESSENTIAL (PRIMARY) HYPERTENSION: ICD-10-CM

## 2022-04-22 DIAGNOSIS — Z98.890 OTHER SPECIFIED POSTPROCEDURAL STATES: Chronic | ICD-10-CM

## 2022-04-22 DIAGNOSIS — Z29.9 ENCOUNTER FOR PROPHYLACTIC MEASURES, UNSPECIFIED: ICD-10-CM

## 2022-04-22 DIAGNOSIS — Z87.39 PERSONAL HISTORY OF OTHER DISEASES OF THE MUSCULOSKELETAL SYSTEM AND CONNECTIVE TISSUE: ICD-10-CM

## 2022-04-22 DIAGNOSIS — Z96.642 PRESENCE OF LEFT ARTIFICIAL HIP JOINT: Chronic | ICD-10-CM

## 2022-04-22 DIAGNOSIS — M25.551 PAIN IN RIGHT HIP: ICD-10-CM

## 2022-04-22 LAB
ALBUMIN SERPL ELPH-MCNC: 3.6 G/DL — SIGNIFICANT CHANGE UP (ref 3.3–5)
ALP SERPL-CCNC: 79 U/L — SIGNIFICANT CHANGE UP (ref 40–120)
ALT FLD-CCNC: 6 U/L — LOW (ref 10–45)
ANION GAP SERPL CALC-SCNC: 12 MMOL/L — SIGNIFICANT CHANGE UP (ref 5–17)
APTT BLD: 40 SEC — HIGH (ref 27.5–35.5)
AST SERPL-CCNC: 12 U/L — SIGNIFICANT CHANGE UP (ref 10–40)
BASOPHILS # BLD AUTO: 0.03 K/UL — SIGNIFICANT CHANGE UP (ref 0–0.2)
BASOPHILS NFR BLD AUTO: 0.3 % — SIGNIFICANT CHANGE UP (ref 0–2)
BILIRUB SERPL-MCNC: 0.3 MG/DL — SIGNIFICANT CHANGE UP (ref 0.2–1.2)
BUN SERPL-MCNC: 26 MG/DL — HIGH (ref 7–23)
CALCIUM SERPL-MCNC: 9.2 MG/DL — SIGNIFICANT CHANGE UP (ref 8.4–10.5)
CHLORIDE SERPL-SCNC: 108 MMOL/L — SIGNIFICANT CHANGE UP (ref 96–108)
CO2 SERPL-SCNC: 21 MMOL/L — LOW (ref 22–31)
CREAT SERPL-MCNC: 1.35 MG/DL — HIGH (ref 0.5–1.3)
EGFR: 36 ML/MIN/1.73M2 — LOW
EOSINOPHIL # BLD AUTO: 0.06 K/UL — SIGNIFICANT CHANGE UP (ref 0–0.5)
EOSINOPHIL NFR BLD AUTO: 0.6 % — SIGNIFICANT CHANGE UP (ref 0–6)
GLUCOSE SERPL-MCNC: 126 MG/DL — HIGH (ref 70–99)
HCT VFR BLD CALC: 34.3 % — LOW (ref 34.5–45)
HGB BLD-MCNC: 10.9 G/DL — LOW (ref 11.5–15.5)
IMM GRANULOCYTES NFR BLD AUTO: 0.5 % — SIGNIFICANT CHANGE UP (ref 0–1.5)
INR BLD: 0.98 RATIO — SIGNIFICANT CHANGE UP (ref 0.88–1.16)
LYMPHOCYTES # BLD AUTO: 1.51 K/UL — SIGNIFICANT CHANGE UP (ref 1–3.3)
LYMPHOCYTES # BLD AUTO: 14.8 % — SIGNIFICANT CHANGE UP (ref 13–44)
MCHC RBC-ENTMCNC: 29.3 PG — SIGNIFICANT CHANGE UP (ref 27–34)
MCHC RBC-ENTMCNC: 31.8 GM/DL — LOW (ref 32–36)
MCV RBC AUTO: 92.2 FL — SIGNIFICANT CHANGE UP (ref 80–100)
MONOCYTES # BLD AUTO: 0.44 K/UL — SIGNIFICANT CHANGE UP (ref 0–0.9)
MONOCYTES NFR BLD AUTO: 4.3 % — SIGNIFICANT CHANGE UP (ref 2–14)
NEUTROPHILS # BLD AUTO: 8.09 K/UL — HIGH (ref 1.8–7.4)
NEUTROPHILS NFR BLD AUTO: 79.5 % — HIGH (ref 43–77)
NRBC # BLD: 0 /100 WBCS — SIGNIFICANT CHANGE UP (ref 0–0)
PLATELET # BLD AUTO: 332 K/UL — SIGNIFICANT CHANGE UP (ref 150–400)
POTASSIUM SERPL-MCNC: 4.7 MMOL/L — SIGNIFICANT CHANGE UP (ref 3.5–5.3)
POTASSIUM SERPL-SCNC: 4.7 MMOL/L — SIGNIFICANT CHANGE UP (ref 3.5–5.3)
PROT SERPL-MCNC: 6.6 G/DL — SIGNIFICANT CHANGE UP (ref 6–8.3)
PROTHROM AB SERPL-ACNC: 11.3 SEC — SIGNIFICANT CHANGE UP (ref 10.5–13.4)
RBC # BLD: 3.72 M/UL — LOW (ref 3.8–5.2)
RBC # FLD: 13.8 % — SIGNIFICANT CHANGE UP (ref 10.3–14.5)
SODIUM SERPL-SCNC: 141 MMOL/L — SIGNIFICANT CHANGE UP (ref 135–145)
WBC # BLD: 10.18 K/UL — SIGNIFICANT CHANGE UP (ref 3.8–10.5)
WBC # FLD AUTO: 10.18 K/UL — SIGNIFICANT CHANGE UP (ref 3.8–10.5)

## 2022-04-22 PROCEDURE — 73552 X-RAY EXAM OF FEMUR 2/>: CPT | Mod: 26,RT

## 2022-04-22 PROCEDURE — 76377 3D RENDER W/INTRP POSTPROCES: CPT | Mod: 26

## 2022-04-22 PROCEDURE — 72040 X-RAY EXAM NECK SPINE 2-3 VW: CPT | Mod: 26

## 2022-04-22 PROCEDURE — 73502 X-RAY EXAM HIP UNI 2-3 VIEWS: CPT | Mod: 26,RT

## 2022-04-22 PROCEDURE — 71045 X-RAY EXAM CHEST 1 VIEW: CPT | Mod: 26

## 2022-04-22 PROCEDURE — 99285 EMERGENCY DEPT VISIT HI MDM: CPT | Mod: CS,GC

## 2022-04-22 PROCEDURE — 72192 CT PELVIS W/O DYE: CPT | Mod: 26,MA

## 2022-04-22 PROCEDURE — 99223 1ST HOSP IP/OBS HIGH 75: CPT

## 2022-04-22 RX ORDER — CHOLECALCIFEROL (VITAMIN D3) 125 MCG
1000 CAPSULE ORAL DAILY
Refills: 0 | Status: DISCONTINUED | OUTPATIENT
Start: 2022-04-22 | End: 2022-04-29

## 2022-04-22 RX ORDER — ACETAMINOPHEN 500 MG
650 TABLET ORAL EVERY 6 HOURS
Refills: 0 | Status: DISCONTINUED | OUTPATIENT
Start: 2022-04-22 | End: 2022-04-29

## 2022-04-22 RX ORDER — ACETAMINOPHEN 500 MG
650 TABLET ORAL ONCE
Refills: 0 | Status: COMPLETED | OUTPATIENT
Start: 2022-04-22 | End: 2022-04-22

## 2022-04-22 RX ORDER — LIDOCAINE 4 G/100G
1 CREAM TOPICAL ONCE
Refills: 0 | Status: COMPLETED | OUTPATIENT
Start: 2022-04-22 | End: 2022-04-22

## 2022-04-22 RX ORDER — HEPARIN SODIUM 5000 [USP'U]/ML
5000 INJECTION INTRAVENOUS; SUBCUTANEOUS EVERY 12 HOURS
Refills: 0 | Status: DISCONTINUED | OUTPATIENT
Start: 2022-04-22 | End: 2022-04-23

## 2022-04-22 RX ORDER — PREGABALIN 225 MG/1
1000 CAPSULE ORAL DAILY
Refills: 0 | Status: DISCONTINUED | OUTPATIENT
Start: 2022-04-22 | End: 2022-04-29

## 2022-04-22 RX ORDER — LOSARTAN POTASSIUM 100 MG/1
100 TABLET, FILM COATED ORAL DAILY
Refills: 0 | Status: DISCONTINUED | OUTPATIENT
Start: 2022-04-22 | End: 2022-04-25

## 2022-04-22 RX ADMIN — LIDOCAINE 1 PATCH: 4 CREAM TOPICAL at 13:40

## 2022-04-22 RX ADMIN — LIDOCAINE 1 PATCH: 4 CREAM TOPICAL at 22:46

## 2022-04-22 RX ADMIN — Medication 650 MILLIGRAM(S): at 19:01

## 2022-04-22 RX ADMIN — Medication 650 MILLIGRAM(S): at 17:26

## 2022-04-22 NOTE — H&P ADULT - HISTORY OF PRESENT ILLNESS
NIGHT HOSPITALIST:  Patient UNKNOWN to me previously, assigned to me at this point via the ER to admit this independent 89 y/o F--followed by her office physician above--patient with a history of essential HTN maintained on Losartan 100 mg daily, apparent RA (not on treatment and unclear from history and ROS of prior joint involvement ), S/P resection of spinal meningioma in 2019 with chronic residual B/L foot neuropathy, patient lives alone and apparently with an episode last Friday at home when the patient was opening a door with the patient losing her balance with presumed mechanical fall with RIGHT hip pain and unable to weight bear.  Apparently patient was seen at an urgent care center--reported negative film for a RIGHT hip fracture per patient--with patient discharged from the urgent care center to home but with still persistent RIGHT hip pain for the past week and unable to weight bear.  NO LOC.  NO dizziness.  NO HA or focal weakness.   Patient denies neck pain.

## 2022-04-22 NOTE — H&P ADULT - NSHPADDITIONALINFOADULT_GEN_ALL_CORE
NIGHT HOSPITALIST:    Patient/family aware of course and agree with plan/care as above.   The patient did not wish for the examiner to contact her son at this hour, with son with prior review with ED attending.   Emotional support provided to patient.  Care reviewed with covering NP/PA for endorsement to my physician colleagues.    Orville Turner MD  Available on [employer] Teams. NIGHT HOSPITALIST:    Patient/family aware of course and agree with plan/care as above.   The patient did not wish for the examiner to contact her son at this hour, with son with prior review with ED attending.   Emotional support provided to patient.  Care reviewed with covering NP/PA, Radha,  for endorsement to my physician colleagues.    Orville Turner MD  Available on [employer] Teams.

## 2022-04-22 NOTE — H&P ADULT - PROBLEM SELECTOR PLAN 5
Transitions of Care Status:  1.  Name of PCP:   Leroy Gandara MD (PCP) [phone]  2.  PCP Contacted on Admission: [ ] Y    [x ] N    3.  PCP contacted at Discharge: [ ] Y    [ ] N    [ ] N/A  4.  Post-Discharge Appointment Date and Location:  5.  Summary of Handoff given to PCP:

## 2022-04-22 NOTE — ED PROVIDER NOTE - SHIFT CHANGE DETAILS
Attending MD Rodrigez: 89F s/p fall 1 wk ago, has been ambulating, XR showing acute slightly offset right greater trochanter fracture, pending CTr and Ortho consult

## 2022-04-22 NOTE — ED PROVIDER NOTE - NSICDXPASTSURGICALHX_GEN_ALL_CORE_FT
PAST SURGICAL HISTORY:  History of hemorrhoidectomy     History of left hip replacement 10 years ago

## 2022-04-22 NOTE — ED ADULT NURSE NOTE - NSICDXPASTMEDICALHX_GEN_ALL_CORE_FT
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

## 2022-04-22 NOTE — ED ADULT NURSE NOTE - NSIMPLEMENTINTERV_GEN_ALL_ED
Implemented All Fall with Harm Risk Interventions:  [place] to call system. Call bell, personal items and telephone within reach. Instruct patient to call for assistance. Room bathroom lighting operational. Non-slip footwear when patient is off stretcher. Physically safe environment: no spills, clutter or unnecessary equipment. Stretcher in lowest position, wheels locked, appropriate side rails in place. Provide visual cue, wrist band, yellow gown, etc. Monitor gait and stability. Monitor for mental status changes and reorient to person, place, and time. Review medications for side effects contributing to fall risk. Reinforce activity limits and safety measures with patient and family. Provide visual clues: red socks.

## 2022-04-22 NOTE — H&P ADULT - NSHPREVIEWOFSYSTEMS_GEN_ALL_CORE
NO fever, no chills, no rigors.  NO chest pain/pressure.  No palpitations.   Patient recently climbed stairs in her home with no symptoms of chest pain/pressure.  NO abdominal pain, no red blood per rectum or melena.  NO back pain, no tearing back pain.  NO neck pain, no rash.  Denies small joint pain/swelling.    NO breast symptoms.  NO cough, no wheezing, no dyspnoea.  No weight loss or anorexia.  NO dysuria, no hematuria.  NO thyroid symptoms.  No vaginal bleeding.

## 2022-04-22 NOTE — H&P ADULT - PROBLEM SELECTOR PLAN 6
See above.  Never previously on treatment--unclear to past extent of disease.  Cervical spine flex/extension films ordered.
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
## 2022-04-22 NOTE — H&P ADULT - NSHPLABSRESULTS_GEN_ALL_CORE
WBC 10.1  normal differential.    Hgb 10.9    Platelets of 332K>    INR 0.98    Random glucose of 126    K+ 4.7    Cr 1.3 (CKD3) but no change from July 2019 study of 1.38.    COVID-19 PCR>>sent.    EKG ordered.    Chest radiograph and cervical spine flexion/extension films ordered. WBC 10.1  normal differential.    Hgb 10.9    Platelets of 332K>    INR 0.98    Random glucose of 126    K+ 4.7    Cr 1.3 (CKD3) but no change from July 2019 study of 1.38.    COVID-19 PCR>>sent.    EKG ordered.    Chest radiograph and cervical spine flexion/extension films ordered.    X ray RIGHT femur with acute slightly offset RIGHT greater troch fx.    CTT Pelvis with acute minimally displaced RIGHT greater troch fx with possible IT extension>>MRI ordered. WBC 10.1  normal differential.    Hgb 10.9    Platelets of 332K>    INR 0.98    Random glucose of 126    K+ 4.7    Cr 1.3 (CKD3) but no change from July 2019 study of 1.38.    COVID-19 PCR>>sent.    EKG ordered.    Chest radiograph reviewed with no acute infiltrate or effusion.    Cervical spine flexion/extension films ordered.    X ray RIGHT femur with acute slightly offset RIGHT greater troch fx.    CTT Pelvis with acute minimally displaced RIGHT greater troch fx with possible IT extension>>MRI ordered.

## 2022-04-22 NOTE — ED ADULT NURSE REASSESSMENT NOTE - NS ED NURSE REASSESS COMMENT FT1
Patient received from ED [facility]. In bed resting comfortably, AOx4, breathing spontaneous and unlabored on room air. Pending MRI. Comfort and safety maintained.

## 2022-04-22 NOTE — H&P ADULT - NSICDXPASTMEDICALHX_GEN_ALL_CORE_FT
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

## 2022-04-22 NOTE — ED PROVIDER NOTE - PHYSICAL EXAMINATION
Vitals: I have reviewed the patients vital signs  General: Well dressed, well appearing, no acute distress  HEENT: Atraumatic, normocephalic, airway patent  Eyes: EOMI, tracking appropriately  Neck: no tracheal deviation, no JVD  Chest/Lungs: no trauma, symmetric chest rise, speaking in complete sentences, no WOB  Heart: skin and extremities well perfused, regular rate and rhythm  Neuro: A+Ox3, CN grossly intact  MSK: Mild tenderness at R proximal femur and greater trochanter, no leg length discrepancy, neurovasc intact, decreased ROM in flexion due to pain, mild pain with internal/external rotation.   Skin: no cyanosis, no jaundice, no new emergent lesions

## 2022-04-22 NOTE — ED ADULT NURSE NOTE - OBJECTIVE STATEMENT
89 year old female presents to ED c/o R hip pain s /p mech fall last week. No deformity noted but increased pain making it difficult to walk. In no acute distress.

## 2022-04-22 NOTE — H&P ADULT - NSICDXPASTSURGICALHX_GEN_ALL_CORE_FT
PAST SURGICAL HISTORY:  History of hemorrhoidectomy     History of left hip replacement 10 years ago    S/P resection of meningioma Spine 2019 T8 with T7-T9 laminectomy

## 2022-04-22 NOTE — H&P ADULT - NSHPSOURCEINFOTX_GEN_ALL_CORE
Reviewed Medex with patient with HIE note from Dr. Gandara from 3/23/22.   Patient's son present earlier in the ER and the patient did not wish for the examiner to contact her son at this hour.

## 2022-04-22 NOTE — ED PROVIDER NOTE - CLINICAL SUMMARY MEDICAL DECISION MAKING FREE TEXT BOX
89F baseline ambulatory here 1 week s/p fall onto R hip after which she had negative XR at urgent care. Pain persistent and limiting ambulation. On exam mild TTP along prox femur and greater troch, mild decrease in ROM due to pain, no midline back or neck pain, neurovasc at baseline. Plan for repeat XR, if neg and cannot ambulate - CT.

## 2022-04-22 NOTE — ED PROVIDER NOTE - NS ED ROS FT
Constitutional: (-) fever (-) vomiting  Eyes/ENT: (-) vision changes, (-) hearing changes  Cardiovascular: (-) chest pain, (-) wheezing  Respiratory: (-) cough, (-) shortness of breath  Gastrointestinal: (-) vomiting, (-) diarrhea, (-) abdominal pain  : (-) dysuria   Musculoskeletal: +hip pain  Integumentary: (-) rash, (-) edema  Neurological: (-)loc  Allergic/Immunologic: (-) pruritus  Endocrine: No history of thyroid disease

## 2022-04-22 NOTE — ED PROVIDER NOTE - OBJECTIVE STATEMENT
89 y/o F hx HTN, previous spinal column tumor resected 2 years ago, L hip replacement, here now with R hip pain. Had a fall 1 week ago today, was opening a cabinet and fell backwards, landed on R hip, no headstrike, no LOC, no presyncope, no veritgo. Had negative XR at urgent care at that time, comes now because pain is persistent and limiting ambulation. No back pain, no new paresthesia/anesthesia (at baseline has some degree of bilateral LE neuropathy), no f/c, urinary sx, abd pain.

## 2022-04-22 NOTE — CONSULT NOTE ADULT - SUBJECTIVE AND OBJECTIVE BOX
89y Female presents to [facility] ED s/p [facility] fall 1 week ago c/o moderate persistent hip pain and difficulty in ambulation.  Patient denies headstrike or LOC. Localizes mild pain to R hip/femur, moderate pain with ambulation. Patient denies radiation of pain. Patient denies new numbness/tingling/burning in the RLE. Endorses baseline numbness in BL feet from neuropathy, longstanding. No other bone/joint complaints. Patient is a community ambulator at baseline without assistive devices. Patient has been using a walker and cane in the past week since the fall.    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 No TTP to knee/leg/ankle/foot   ROM limited 2/2 pain   Able to SLR; - Log Roll/ + Heel Strike  Motor intact GS/TA/FHL/EHL  SILT L2-S1, partial numbness in dorsum and plantar aspect of foot  DP/PT pulses 2+    SECONDARY EXAM: Benign, Skin intact, SILT throughout, motor grossly intact throughout, no other orthopedic injuries at this time, compartments soft and compressible    SPINE: Skin intact, no bony tenderness or step-offs appreciated throughout cervical/thoracic/lumbar/sacral spine    BLUE: Skin intact, no erythema, ecchymosis, edema, gross deformity, NTTP over the bony prominences of the shoulder/elbow/wrist/hand, painless passive/active ROM of the shoulder/elbow/wrist/hand, C5-T1 SILT, motor grossly intact throughout axillary/musculocutaenous/radial/median/ulnar nerves, + radial pulse    LLE: Skin intact, no erythema, ecchymosis, edema, gross deformity, NTTP over the bony prominences of the hip/knee/ankle/foot, painless passive/active ROM of the hip/knee/ankle/foot, L2-S1 SILT, motor grossly intact throughout hip flexors/quads/hams/TA/EHL/FHL/GSC, + DP/PT pulses, no pain with log roll, no pain on axial loading, compartments soft and compressible, calves nontender      Imaging:  XR demonstrating R Greater Troch fx, with possible extension into IT region    89y Female with R Greater Troch fx, with possible extension into IT region    - CT suspicious for IT extension of fx  - NWB, bedrest for now  - MR recommended for further evaluation of possible fracture  - Will determine clinical management and possible surgical intervention based on MR results  - Will discuss with Dr. Burdick and update plan accordingly

## 2022-04-23 DIAGNOSIS — U07.1 COVID-19: ICD-10-CM

## 2022-04-23 LAB
ANION GAP SERPL CALC-SCNC: 14 MMOL/L — SIGNIFICANT CHANGE UP (ref 5–17)
ANION GAP SERPL CALC-SCNC: 17 MMOL/L — SIGNIFICANT CHANGE UP (ref 5–17)
APTT BLD: 31.3 SEC — SIGNIFICANT CHANGE UP (ref 27.5–35.5)
BLD GP AB SCN SERPL QL: NEGATIVE — SIGNIFICANT CHANGE UP
BUN SERPL-MCNC: 23 MG/DL — SIGNIFICANT CHANGE UP (ref 7–23)
BUN SERPL-MCNC: 23 MG/DL — SIGNIFICANT CHANGE UP (ref 7–23)
CALCIUM SERPL-MCNC: 9.1 MG/DL — SIGNIFICANT CHANGE UP (ref 8.4–10.5)
CALCIUM SERPL-MCNC: 9.4 MG/DL — SIGNIFICANT CHANGE UP (ref 8.4–10.5)
CHLORIDE SERPL-SCNC: 106 MMOL/L — SIGNIFICANT CHANGE UP (ref 96–108)
CHLORIDE SERPL-SCNC: 109 MMOL/L — HIGH (ref 96–108)
CO2 SERPL-SCNC: 18 MMOL/L — LOW (ref 22–31)
CO2 SERPL-SCNC: 18 MMOL/L — LOW (ref 22–31)
CREAT SERPL-MCNC: 1.2 MG/DL — SIGNIFICANT CHANGE UP (ref 0.5–1.3)
CREAT SERPL-MCNC: 1.26 MG/DL — SIGNIFICANT CHANGE UP (ref 0.5–1.3)
EGFR: 40 ML/MIN/1.73M2 — LOW
EGFR: 42 ML/MIN/1.73M2 — LOW
GLUCOSE SERPL-MCNC: 74 MG/DL — SIGNIFICANT CHANGE UP (ref 70–99)
GLUCOSE SERPL-MCNC: 77 MG/DL — SIGNIFICANT CHANGE UP (ref 70–99)
HCT VFR BLD CALC: 35.6 % — SIGNIFICANT CHANGE UP (ref 34.5–45)
HCT VFR BLD CALC: 38.4 % — SIGNIFICANT CHANGE UP (ref 34.5–45)
HGB BLD-MCNC: 11.1 G/DL — LOW (ref 11.5–15.5)
HGB BLD-MCNC: 12 G/DL — SIGNIFICANT CHANGE UP (ref 11.5–15.5)
INR BLD: 0.98 RATIO — SIGNIFICANT CHANGE UP (ref 0.88–1.16)
MCHC RBC-ENTMCNC: 29.2 PG — SIGNIFICANT CHANGE UP (ref 27–34)
MCHC RBC-ENTMCNC: 29.4 PG — SIGNIFICANT CHANGE UP (ref 27–34)
MCHC RBC-ENTMCNC: 31.2 GM/DL — LOW (ref 32–36)
MCHC RBC-ENTMCNC: 31.3 GM/DL — LOW (ref 32–36)
MCV RBC AUTO: 93.4 FL — SIGNIFICANT CHANGE UP (ref 80–100)
MCV RBC AUTO: 94.4 FL — SIGNIFICANT CHANGE UP (ref 80–100)
NRBC # BLD: 0 /100 WBCS — SIGNIFICANT CHANGE UP (ref 0–0)
NRBC # BLD: 0 /100 WBCS — SIGNIFICANT CHANGE UP (ref 0–0)
PLATELET # BLD AUTO: 328 K/UL — SIGNIFICANT CHANGE UP (ref 150–400)
PLATELET # BLD AUTO: 336 K/UL — SIGNIFICANT CHANGE UP (ref 150–400)
POTASSIUM SERPL-MCNC: 4.8 MMOL/L — SIGNIFICANT CHANGE UP (ref 3.5–5.3)
POTASSIUM SERPL-MCNC: 5 MMOL/L — SIGNIFICANT CHANGE UP (ref 3.5–5.3)
POTASSIUM SERPL-SCNC: 4.8 MMOL/L — SIGNIFICANT CHANGE UP (ref 3.5–5.3)
POTASSIUM SERPL-SCNC: 5 MMOL/L — SIGNIFICANT CHANGE UP (ref 3.5–5.3)
PROTHROM AB SERPL-ACNC: 11.4 SEC — SIGNIFICANT CHANGE UP (ref 10.5–13.4)
RBC # BLD: 3.77 M/UL — LOW (ref 3.8–5.2)
RBC # BLD: 4.11 M/UL — SIGNIFICANT CHANGE UP (ref 3.8–5.2)
RBC # FLD: 13.6 % — SIGNIFICANT CHANGE UP (ref 10.3–14.5)
RBC # FLD: 13.8 % — SIGNIFICANT CHANGE UP (ref 10.3–14.5)
RH IG SCN BLD-IMP: POSITIVE — SIGNIFICANT CHANGE UP
RH IG SCN BLD-IMP: POSITIVE — SIGNIFICANT CHANGE UP
SARS-COV-2 RNA SPEC QL NAA+PROBE: DETECTED
SODIUM SERPL-SCNC: 141 MMOL/L — SIGNIFICANT CHANGE UP (ref 135–145)
SODIUM SERPL-SCNC: 141 MMOL/L — SIGNIFICANT CHANGE UP (ref 135–145)
WBC # BLD: 19.07 K/UL — HIGH (ref 3.8–10.5)
WBC # BLD: 8.59 K/UL — SIGNIFICANT CHANGE UP (ref 3.8–10.5)
WBC # FLD AUTO: 19.07 K/UL — HIGH (ref 3.8–10.5)
WBC # FLD AUTO: 8.59 K/UL — SIGNIFICANT CHANGE UP (ref 3.8–10.5)

## 2022-04-23 PROCEDURE — 27245 TREAT THIGH FRACTURE: CPT | Mod: RT

## 2022-04-23 PROCEDURE — 72195 MRI PELVIS W/O DYE: CPT | Mod: 26

## 2022-04-23 PROCEDURE — 99233 SBSQ HOSP IP/OBS HIGH 50: CPT

## 2022-04-23 DEVICE — NAIL CANN TI 130DEG 11X170MM: Type: IMPLANTABLE DEVICE | Site: RIGHT | Status: FUNCTIONAL

## 2022-04-23 DEVICE — SCREW LOCKING 5X30MM: Type: IMPLANTABLE DEVICE | Site: RIGHT | Status: FUNCTIONAL

## 2022-04-23 DEVICE — BLADE TFNA HELICAL 90MM: Type: IMPLANTABLE DEVICE | Site: RIGHT | Status: FUNCTIONAL

## 2022-04-23 RX ORDER — OXYCODONE HYDROCHLORIDE 5 MG/1
2.5 TABLET ORAL EVERY 4 HOURS
Refills: 0 | Status: DISCONTINUED | OUTPATIENT
Start: 2022-04-23 | End: 2022-04-29

## 2022-04-23 RX ORDER — ACETAMINOPHEN 500 MG
975 TABLET ORAL EVERY 8 HOURS
Refills: 0 | Status: DISCONTINUED | OUTPATIENT
Start: 2022-04-23 | End: 2022-04-29

## 2022-04-23 RX ORDER — ENOXAPARIN SODIUM 100 MG/ML
30 INJECTION SUBCUTANEOUS EVERY 24 HOURS
Refills: 0 | Status: DISCONTINUED | OUTPATIENT
Start: 2022-04-24 | End: 2022-04-29

## 2022-04-23 RX ORDER — FENTANYL CITRATE 50 UG/ML
25 INJECTION INTRAVENOUS
Refills: 0 | Status: DISCONTINUED | OUTPATIENT
Start: 2022-04-23 | End: 2022-04-23

## 2022-04-23 RX ORDER — OXYCODONE HYDROCHLORIDE 5 MG/1
5 TABLET ORAL EVERY 4 HOURS
Refills: 0 | Status: DISCONTINUED | OUTPATIENT
Start: 2022-04-23 | End: 2022-04-29

## 2022-04-23 RX ORDER — SODIUM CHLORIDE 9 MG/ML
1000 INJECTION, SOLUTION INTRAVENOUS
Refills: 0 | Status: DISCONTINUED | OUTPATIENT
Start: 2022-04-23 | End: 2022-04-23

## 2022-04-23 RX ORDER — POLYETHYLENE GLYCOL 3350 17 G/17G
17 POWDER, FOR SOLUTION ORAL DAILY
Refills: 0 | Status: DISCONTINUED | OUTPATIENT
Start: 2022-04-23 | End: 2022-04-29

## 2022-04-23 RX ORDER — SODIUM CHLORIDE 9 MG/ML
1000 INJECTION INTRAMUSCULAR; INTRAVENOUS; SUBCUTANEOUS
Refills: 0 | Status: DISCONTINUED | OUTPATIENT
Start: 2022-04-23 | End: 2022-04-23

## 2022-04-23 RX ORDER — MAGNESIUM HYDROXIDE 400 MG/1
30 TABLET, CHEWABLE ORAL DAILY
Refills: 0 | Status: DISCONTINUED | OUTPATIENT
Start: 2022-04-23 | End: 2022-04-29

## 2022-04-23 RX ORDER — SENNA PLUS 8.6 MG/1
2 TABLET ORAL AT BEDTIME
Refills: 0 | Status: DISCONTINUED | OUTPATIENT
Start: 2022-04-23 | End: 2022-04-29

## 2022-04-23 RX ORDER — ONDANSETRON 8 MG/1
4 TABLET, FILM COATED ORAL ONCE
Refills: 0 | Status: DISCONTINUED | OUTPATIENT
Start: 2022-04-23 | End: 2022-04-23

## 2022-04-23 RX ORDER — CHLORHEXIDINE GLUCONATE 213 G/1000ML
1 SOLUTION TOPICAL DAILY
Refills: 0 | Status: DISCONTINUED | OUTPATIENT
Start: 2022-04-23 | End: 2022-04-29

## 2022-04-23 RX ORDER — CEFAZOLIN SODIUM 1 G
2000 VIAL (EA) INJECTION EVERY 8 HOURS
Refills: 0 | Status: COMPLETED | OUTPATIENT
Start: 2022-04-23 | End: 2022-04-24

## 2022-04-23 RX ADMIN — Medication 100 MILLIGRAM(S): at 23:02

## 2022-04-23 RX ADMIN — FENTANYL CITRATE 25 MICROGRAM(S): 50 INJECTION INTRAVENOUS at 18:40

## 2022-04-23 RX ADMIN — LIDOCAINE 1 PATCH: 4 CREAM TOPICAL at 01:30

## 2022-04-23 RX ADMIN — CHLORHEXIDINE GLUCONATE 1 APPLICATION(S): 213 SOLUTION TOPICAL at 12:34

## 2022-04-23 RX ADMIN — SODIUM CHLORIDE 75 MILLILITER(S): 9 INJECTION, SOLUTION INTRAVENOUS at 03:37

## 2022-04-23 RX ADMIN — FENTANYL CITRATE 25 MICROGRAM(S): 50 INJECTION INTRAVENOUS at 18:53

## 2022-04-23 RX ADMIN — Medication 1000 UNIT(S): at 13:06

## 2022-04-23 RX ADMIN — LOSARTAN POTASSIUM 100 MILLIGRAM(S): 100 TABLET, FILM COATED ORAL at 06:45

## 2022-04-23 RX ADMIN — PREGABALIN 1000 MICROGRAM(S): 225 CAPSULE ORAL at 13:06

## 2022-04-23 RX ADMIN — Medication 975 MILLIGRAM(S): at 21:30

## 2022-04-23 RX ADMIN — SENNA PLUS 2 TABLET(S): 8.6 TABLET ORAL at 21:30

## 2022-04-23 RX ADMIN — SODIUM CHLORIDE 75 MILLILITER(S): 9 INJECTION, SOLUTION INTRAVENOUS at 18:30

## 2022-04-23 NOTE — BRIEF OPERATIVE NOTE - NSICDXBRIEFPROCEDURE_GEN_ALL_CORE_FT
PROCEDURES:  Intramedullary rodding of right hip using intertrochanteric-subtrochanteric nail 23-Apr-2022 18:13:21  Brent Virk

## 2022-04-23 NOTE — CHART NOTE - NSCHARTNOTEFT_GEN_A_CORE
s/p R hip IMN; no issues or intraoperative concerns.  Patient handled procedure well.    Plan;  - WBAT RLE  - Pain control PRN  - Ancef x24 hours  - Lovenox to start 4/24 PM

## 2022-04-23 NOTE — PROGRESS NOTE ADULT - SUBJECTIVE AND OBJECTIVE BOX
Orthopedics     Pt seen and examined at the bedside. Pain is well controlled at this time, no concerns at this time.     Vital Signs Last 24 Hrs  T(C): 36.8 (04-23-22 @ 03:31), Max: 36.8 (04-23-22 @ 03:31)  T(F): 98.2 (04-23-22 @ 03:31), Max: 98.2 (04-23-22 @ 03:31)  HR: 60 (04-23-22 @ 03:31) (58 - 66)  BP: 173/70 (04-23-22 @ 03:31) (165/68 - 180/83)  BP(mean): --  RR: 18 (04-23-22 @ 03:31) (16 - 18)  SpO2: 99% (04-23-22 @ 03:31) (97% - 99%)                        10.9   10.18 )-----------( 332      ( 22 Apr 2022 19:50 )             34.3     22 Apr 2022 19:50    141    |  108    |  26     ----------------------------<  126    4.7     |  21     |  1.35     Ca    9.2        22 Apr 2022 19:50    TPro  6.6    /  Alb  3.6    /  TBili  0.3    /  DBili  x      /  AST  12     /  ALT  6      /  AlkPhos  79     22 Apr 2022 19:50    PT/INR - ( 22 Apr 2022 21:48 )   PT: 11.3 sec;   INR: 0.98 ratio         PTT - ( 22 Apr 2022 21:48 )  PTT:40.0 sec    Exam:  GEN: NAD, awake and alert.  RLE  Skin Intact  Mildly TTP over GT  Mild pain with ROM  +EHL FHL TA GS  SILT L2-S1  +DP  Calf soft and nontender, compartments soft and compressible.      A/P:  89yF s/p R Greater Trochanter Fx     Plan:    -Plan for surgical intervention 4/23 pending results of MRI Pelvis, will book and begin preop.  -Preop labs/imaging: CBC/BMP/PT/PTT/INR/T&S/Covid/CXR/EKG.  -NPO except meds/IVFs while NPO.  -Bedrest, NWB RLE  -Hold chem DVT ppx  -Pain control prn  -Medical management, continue home meds.  -Please document medical optimization for possible OR this afternoon  -Case discussed with attending, will advise if plan changes.

## 2022-04-23 NOTE — PROGRESS NOTE ADULT - SUBJECTIVE AND OBJECTIVE BOX
[facility] Division of Hospital Medicine  Petra Mederos MD  Pager (M-F, 8A-5P): [phone]  Other Times:  [phone]    Patient is a 89y old  Female who presents with a chief complaint of S/P fall one week ago with persistent RIGHT hip pain (23 Apr 2022 07:52)      SUBJECTIVE / OVERNIGHT EVENTS:  ADDITIONAL REVIEW OF SYSTEMS:    MEDICATIONS  (STANDING):  chlorhexidine 2% Cloths 1 Application(s) Topical daily  cholecalciferol 1000 Unit(s) Oral daily  cyanocobalamin 1000 MICROGram(s) Oral daily  lactated ringers. 1000 milliLiter(s) (75 mL/Hr) IV Continuous <Continuous>  losartan 100 milliGRAM(s) Oral daily    MEDICATIONS  (PRN):  acetaminophen     Tablet .. 650 milliGRAM(s) Oral every 6 hours PRN Temp greater or equal to 38C (100.4F), Mild Pain (1 - 3)      CAPILLARY BLOOD GLUCOSE        I&O's Summary      PHYSICAL EXAM:  Vital Signs Last 24 Hrs  T(C): 36.7 (23 Apr 2022 07:54), Max: 36.8 (23 Apr 2022 03:31)  T(F): 98.1 (23 Apr 2022 07:54), Max: 98.2 (23 Apr 2022 03:31)  HR: 62 (23 Apr 2022 07:54) (58 - 66)  BP: 165/67 (23 Apr 2022 07:54) (165/67 - 180/83)  BP(mean): --  RR: 18 (23 Apr 2022 07:54) (16 - 18)  SpO2: 98% (23 Apr 2022 07:54) (97% - 99%)  CONSTITUTIONAL: NAD, well-developed, well-groomed  EYES: PERRLA; conjunctiva and sclera clear  ENMT: Moist oral mucosa, no pharyngeal injection or exudates; normal dentition  NECK: Supple, no palpable masses; no thyromegaly  RESPIRATORY: Normal respiratory effort; lungs are clear to auscultation bilaterally  CARDIOVASCULAR: Regular rate and rhythm, normal S1 and S2, no murmur/rub/gallop; No lower extremity edema; Peripheral pulses are 2+ bilaterally  ABDOMEN: Nontender to palpation, normoactive bowel sounds, no rebound/guarding; No hepatosplenomegaly  MUSCULOSKELETAL:  Normal gait; no clubbing or cyanosis of digits; no joint swelling or tenderness to palpation  PSYCH: A+O to person, place, and time; affect appropriate  NEUROLOGY: CN 2-12 are intact and symmetric; no gross sensory deficits   SKIN: No rashes; no palpable lesions    LABS:                        10.9   10.18 )-----------( 332      ( 22 Apr 2022 19:50 )             34.3     04-22    141  |  108  |  26<H>  ----------------------------<  126<H>  4.7   |  21<L>  |  1.35<H>    Ca    9.2      22 Apr 2022 19:50    TPro  6.6  /  Alb  3.6  /  TBili  0.3  /  DBili  x   /  AST  12  /  ALT  6<L>  /  AlkPhos  79  04-22    PT/INR - ( 22 Apr 2022 21:48 )   PT: 11.3 sec;   INR: 0.98 ratio         PTT - ( 22 Apr 2022 21:48 )  PTT:40.0 sec            RADIOLOGY & ADDITIONAL TESTS:  Results Reviewed:   Imaging Personally Reviewed:  Electrocardiogram Personally Reviewed:    COORDINATION OF CARE:  Care Discussed with Consultants/Other Providers [Y/N]:  Prior or Outpatient Records Reviewed [Y/N]:   [facility]  Petra Mederos MD  Pager (M-F, 8A-5P): [phone]  Other Times:  [phone]    Patient is a 89y old  Female who presents with a chief complaint of S/P fall one week ago with persistent RIGHT hip pain (23 Apr 2022 07:52)      SUBJECTIVE / OVERNIGHT EVENTS: Seen and examined at bedside. No acute events. Planned for OR today.  ADDITIONAL REVIEW OF SYSTEMS: negative    MEDICATIONS  (STANDING):  chlorhexidine 2% Cloths 1 Application(s) Topical daily  cholecalciferol 1000 Unit(s) Oral daily  cyanocobalamin 1000 MICROGram(s) Oral daily  lactated ringers. 1000 milliLiter(s) (75 mL/Hr) IV Continuous <Continuous>  losartan 100 milliGRAM(s) Oral daily    MEDICATIONS  (PRN):  acetaminophen     Tablet .. 650 milliGRAM(s) Oral every 6 hours PRN Temp greater or equal to 38C (100.4F), Mild Pain (1 - 3)      CAPILLARY BLOOD GLUCOSE        I&O's Summary      PHYSICAL EXAM:  Vital Signs Last 24 Hrs  T(C): 36.7 (23 Apr 2022 07:54), Max: 36.8 (23 Apr 2022 03:31)  T(F): 98.1 (23 Apr 2022 07:54), Max: 98.2 (23 Apr 2022 03:31)  HR: 62 (23 Apr 2022 07:54) (58 - 66)  BP: 165/67 (23 Apr 2022 07:54) (165/67 - 180/83)  BP(mean): --  RR: 18 (23 Apr 2022 07:54) (16 - 18)  SpO2: 98% (23 Apr 2022 07:54) (97% - 99%)    CONSTITUTIONAL: NAD, well-appearing elderly woman  EYES: PERRLA; conjunctiva and sclera clear  ENMT: Moist oral mucosa, no pharyngeal injection or exudates; normal dentition  NECK: Supple, no palpable masses; no thyromegaly  RESPIRATORY: Normal respiratory effort; lungs are clear to auscultation bilaterally  CARDIOVASCULAR: Regular rate and rhythm, normal S1 and S2, no murmur/rub/gallop; No lower extremity edema; Peripheral pulses are 2+ bilaterally  ABDOMEN: Nontender to palpation, normoactive bowel sounds, no rebound/guarding; No hepatosplenomegaly  MUSCULOSKELETAL: Decreased ROm RLE due to pain  PSYCH: A+O to person, place, and time; affect appropriate  NEUROLOGY: CN 2-12 are intact and symmetric; no gross sensory deficits   SKIN: No rashes; no palpable lesions    LABS: reviewed                        10.9   10.18 )-----------( 332      ( 22 Apr 2022 19:50 )             34.3     04-22    141  |  108  |  26<H>  ----------------------------<  126<H>  4.7   |  21<L>  |  1.35<H>    Ca    9.2      22 Apr 2022 19:50    TPro  6.6  /  Alb  3.6  /  TBili  0.3  /  DBili  x   /  AST  12  /  ALT  6<L>  /  AlkPhos  79  04-22    PT/INR - ( 22 Apr 2022 21:48 )   PT: 11.3 sec;   INR: 0.98 ratio         PTT - ( 22 Apr 2022 21:48 )  PTT:40.0 sec    CT and MRI reviewed.

## 2022-04-23 NOTE — PATIENT PROFILE ADULT - NSPROGENBLOODRESTRICT_GEN_A_NUR
Your Child's Health  FOUR-MONTH-OLD VISIT       Hammadse Persaud  Mariya 10, 2020    Visit Vitals  Pulse 160   Resp 30   Ht 26\" (66 cm)   Wt 6.634 kg   HC 48.3 cm (19\")   BMI 15.21 kg/m²     Weight: 14.63 lbs    YOUR CHILD'S 4 MONTH-OLD VISIT      Key points at this age…    • Car seat safety is critical! Make sure your baby is safely and properly riding in a rear-facing car seat.    • Continue to provide a safe sleep environment for your baby. Items left in a crib can cause choking and suffocation.     • Your baby will be moving around more soon. It is important to protect your baby from falling and from having access to harmful substances and objects.       NUTRITION: Breastmilk will still provide all the nutrition your baby needs at this age. If you are still breastfeeding, continue to give a vitamin D supplement. (If you are formula feeding, your baby will be getting all the needed vitamins in the formula.) Your baby should continue on breastmilk or formula until 12 months of age. Juice is not recommended for infants younger than 12 months of age.    Between now and 6 months, your baby will likely be ready to start some solid foods. When your baby has good head control, try a few spoonfuls of a baby food (infant cereals and pureed meats are good starter foods [especially for breastfeeding babies] because they have lots of iron). If Hammad clearly swallows easily (rather than pushing the food back out of their mouth!), you can start offering foods regularly. Once it is clear your baby is tolerating their first foods well, start experimenting with other baby foods like fruits and vegetables. Try just one food at a time (so that it will be clear if your baby does not tolerate it). Do not give your baby any food that is solid or chunky at this age.     Do not give your baby honey at this age. (Honey should be avoided until age 12 months because of the risk of a rare infection.)     Watch your baby carefully when they  are feeding. Do not “bottle prop” (because of the risk of choking) and do not put cereal or other foods in the bottle.  Also, do not let your baby sleep with a bottle (this can lead to ear infections and tooth decay).       DEVELOPMENT:  Things start happening fast at this age! Your baby is likely to start rolling (from front to back and back to front), reaching and grabbing for things, and they will soon be putting everything in their mouth (so watch them carefully!). They will be doing lots of babbling and cooing and laughing. You will probably start to recognize that their cry is different according to why they are crying (hunger, fear, etc.).    Reading books is a great way to spend time with your baby. Even if it is only for a few minutes at a time, reading to infants has been scientifically proven to stimulate brain development, strengthen parent-child bonding and improve language and literacy skills long term. (Television, on the other hand, has not been shown to provide any benefits. So turn off the TV and read a book!)    PARENTING TIPS:  Parent health is just as important as infant health. Make sure you are taking care of yourself and enjoying your baby. It is normal to be tired and overwhelmed sometimes, but these feelings shouldn’t last for long. Post-partum depression can affect moms by making them feel unable to love their baby, hopeless about the future and not excited about caring for their baby. Talk to your doctor if you are feeling this way emotionally; it can be helped!    Remember never to yell or curse or hit Hammad.  Don’t allow drugs, alcohol, or foul language around your children. (Babies can learn bad behavior quickly!) If someone in your home owns a firearm, make sure it is always stored safely: unloaded and locked in a gun safe with the ammunition stored separately.       Remember to NEVER, EVER shake your baby when you are frustrated.    DENTAL  Sharing spoons or cleaning off a pacifier  in your mouth may increase your baby’s risk of cavities (even if they don’t have teeth yet!) because it transfers bacteria to your baby’s mouth.       SAFETY:   1. Burns and Water Safety:  Prevent scald burns by adjusting your hot-water heater temperature to 120-125°F. Also, do not handle hot items (hot dishes, hot drinks, irons) while carrying your baby. Make sure smoke detectors and carbon monoxide detectors are installed and properly working. Also, never leave your child alone in a bathtub (or sink or pool), even for a moment.      2. Falls: Never leave Hammad alone on a bed, couch, changing table or counter--if they are not already rolling, they will be soon! Always keep one hand on them when you are changing them. Whenever your baby is in any sort of seat (or carrier or swing), make sure they are properly strapped in. Also, your baby will be crawling soon. Start working on grayson for stairways and doorways to prevent falls and to keep them safe. Walkers are actually dangerous for babies (because of the risk of falling); they are not recommended at any age.     3. Harmful Household Items: Be aware that common items can be harmful to infants. Small things (marbles, coins, pieces of plastic, plastic bags, buttons, batteries, balloons) can cause choking or suffocation. Sharp objects (scissors, toys with sharp edges) should be kept out of reach. Never put anything (necklaces, strings, cords) around your baby’s neck because of the risk of strangulation. Button batteries are particularly dangerous to children: they can not only cause choking but they can cause internal tissue and organ damage that can be life-threatening. Be very aware of this risk when you are changing batteries in things like remote controls, garage door openers, cell phones, flameless candles, watches, cameras, and digital thermometers. Button batteries are also present in musical greeting cards (often very appealing to curious babies!) If you think  your child has swallowed a button battery, they should be evaluated immediately.     4. Poisoning:  Medications and cleaning products must be kept out of reach (not just out of sight!). Remember your baby will soon be crawling and will be able to open cupboards which are not locked. Keep and use the original safety caps that come with medications; do not transfer medicines to non-child-proofed or unlabeled containers. Be especially careful when around older persons (either in their home or in yours) because they may be in the habit of keeping their medicines in open view or in non-childproofed pill containers.      Call the Poison Center at 1-346.925.6333 for any known or suspected poisoning. (Put this number in your cell phone so you have it when you need it!)    5. Car Safety:  An approved rear-facing car seat is required by [place] law for Hammad. It should always be in the back-seat. If you are not sure the car seat is installed or adjusted correctly, a couple helpful web sites are www.safercar.gov and the [place] DOT website (search for “car seats”). Remember that the seat straps need to be very snug to protect your baby in case of an accident (so no thick coats or snowsuits while riding in the car during the winter!). Use your child's car seat even for short trips (most accidents occur close to home!) and don't forget to wear your own seat belt.      6. Safe Sleeping: Your baby should continue to sleep alone in their own bed, ideally in your room. Continue to put them to bed on their backs (“back to sleep”), but if they start rolling over on their own, you do not have to keep turning them over.  Continue to avoid loose, soft bedding (blankets, comforters, sheepskins, quilts, pillows, pillow-like bumper pads) and soft toys in the baby’s crib because they are a risk for suffocation (and SIDS). Safety criteria for cribs include: slats or bars no more than 2-3/8 inches (6cm) apart, a snug fitting mattress, and  a distance of no less than 26 inches from the mattress to the top rail.    7. Sun Exposure:  Your baby should not be spending time in the direct sun at this age. Sunscreens are ideally not recommended until after 6 months of age, so it safest to keep your baby in the shade or use sun-protective clothing (including hats and sunglasses!) when you are out. Small amounts of an infant sunscreen with an SPF of 30 or higher can be applied to exposed skin (faces, backs of hands) if your baby has to be outside for short periods. Look for products that contain only titanium dioxide or zinc oxide as the active ingredient (these are the safest and least irritating products for babies; avoid products that list other chemicals at this age).     8. Smoking: Do not let anyone smoke around your baby in the house OR in the car.   Exposure to cigarette smoke will increase your baby’s risk of SIDS (crib death), asthma, ear infections, and respiratory infections (pneumonia).    MEDICATION FOR FEVER OR PAIN:   Acetaminophen liquid (e.g., Tylenol or Tempra) may be given every four hours as needed for pain or fever.      INFANT/CHILDREN’S Tylenol/Acetaminophen  (160 MG/5 mL)    Child’s Weight: Dose:  06 - 11 pounds:   40 mg (1.25 mL  (1/4 Teaspoon))  12 - 17 pounds:   80 mg (2.5 mL  (1/2 Teaspoon))    If Hammad is outside these weight ranges, call your pediatrician's office for advice.         Most Recent Immunizations   Administered Date(s) Administered   • DTaP/Hep B/IPV 2020   • Hep B, adolescent or pediatric 2020   • Hib (PRP-OMP) 2020   • Pneumococcal Conjugate 13 valent 2020   • Rotavirus - pentavalent 2020       If Hammad develops any of the following reactions within 72 hours after an immunization, notify your pediatrician by calling the pediatric phone nurse:  1.   A temperature of 105 degrees or above.  2.   More than 3 hours of continuous crying.  3.   A shrill, high-pitched cry.  4.   A seizure or a  fainting spell.  In this case, you should call 911 or go immediately to the emergency room.        NEXT VISIT: SIX MONTHS OF AGE    Thank you for entrusting your care to [facility].    Also, check out “Children’s Health” on the [facility] Blog for updates on timely topics regarding children’s health!              none

## 2022-04-23 NOTE — PRE-ANESTHESIA EVALUATION ADULT - NSANTHPMHFT_GEN_ALL_CORE
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 Planned for OR today.   Incidentally found to have COVID here, does report mild cold symptoms 1 week ago, no cough/SOB, currently asymtpomatic.     Denies other PMH, Consult/clearance notes reviewed

## 2022-04-23 NOTE — PRE-ANESTHESIA EVALUATION ADULT - WEIGHT IN KG
47.6 Minocycline Counseling: Patient advised regarding possible photosensitivity and discoloration of the teeth, skin, lips, tongue and gums.  Patient instructed to avoid sunlight, if possible.  When exposed to sunlight, patients should wear protective clothing, sunglasses, and sunscreen.  The patient was instructed to call the office immediately if the following severe adverse effects occur:  hearing changes, easy bruising/bleeding, severe headache, or vision changes.  The patient verbalized understanding of the proper use and possible adverse effects of minocycline.  All of the patient's questions and concerns were addressed.

## 2022-04-23 NOTE — PATIENT PROFILE ADULT - FALL HARM RISK - HARM RISK INTERVENTIONS
Assistance with ambulation/Assistance OOB with selected safe patient handling equipment/Communicate Risk of Fall with Harm to all staff/Discuss with provider need for PT consult/Monitor gait and stability/Reinforce activity limits and safety measures with patient and family/Tailored Fall Risk Interventions/Visual Cue: Yellow wristband and red socks/Bed in lowest position, wheels locked, appropriate side rails in place/Call bell, personal items and telephone in reach/Instruct patient to call for assistance before getting out of bed or chair/Non-slip footwear when patient is out of bed/[place] to call system/Physically safe environment - no spills, clutter or unnecessary equipment/Purposeful Proactive Rounding/Room/bathroom lighting operational, light cord in reach

## 2022-04-24 LAB
ANION GAP SERPL CALC-SCNC: 16 MMOL/L — SIGNIFICANT CHANGE UP (ref 5–17)
BUN SERPL-MCNC: 33 MG/DL — HIGH (ref 7–23)
CALCIUM SERPL-MCNC: 8.8 MG/DL — SIGNIFICANT CHANGE UP (ref 8.4–10.5)
CHLORIDE SERPL-SCNC: 104 MMOL/L — SIGNIFICANT CHANGE UP (ref 96–108)
CO2 SERPL-SCNC: 18 MMOL/L — LOW (ref 22–31)
CREAT SERPL-MCNC: 1.54 MG/DL — HIGH (ref 0.5–1.3)
EGFR: 31 ML/MIN/1.73M2 — LOW
GLUCOSE SERPL-MCNC: 101 MG/DL — HIGH (ref 70–99)
HCT VFR BLD CALC: 31 % — LOW (ref 34.5–45)
HGB BLD-MCNC: 9.7 G/DL — LOW (ref 11.5–15.5)
MCHC RBC-ENTMCNC: 29.6 PG — SIGNIFICANT CHANGE UP (ref 27–34)
MCHC RBC-ENTMCNC: 31.3 GM/DL — LOW (ref 32–36)
MCV RBC AUTO: 94.5 FL — SIGNIFICANT CHANGE UP (ref 80–100)
NRBC # BLD: 0 /100 WBCS — SIGNIFICANT CHANGE UP (ref 0–0)
PLATELET # BLD AUTO: 308 K/UL — SIGNIFICANT CHANGE UP (ref 150–400)
POTASSIUM SERPL-MCNC: 5.3 MMOL/L — SIGNIFICANT CHANGE UP (ref 3.5–5.3)
POTASSIUM SERPL-SCNC: 5.3 MMOL/L — SIGNIFICANT CHANGE UP (ref 3.5–5.3)
RBC # BLD: 3.28 M/UL — LOW (ref 3.8–5.2)
RBC # FLD: 14 % — SIGNIFICANT CHANGE UP (ref 10.3–14.5)
SODIUM SERPL-SCNC: 138 MMOL/L — SIGNIFICANT CHANGE UP (ref 135–145)
WBC # BLD: 10.76 K/UL — HIGH (ref 3.8–10.5)
WBC # FLD AUTO: 10.76 K/UL — HIGH (ref 3.8–10.5)

## 2022-04-24 PROCEDURE — 99233 SBSQ HOSP IP/OBS HIGH 50: CPT

## 2022-04-24 RX ORDER — SODIUM CHLORIDE 9 MG/ML
500 INJECTION, SOLUTION INTRAVENOUS
Refills: 0 | Status: DISCONTINUED | OUTPATIENT
Start: 2022-04-24 | End: 2022-04-25

## 2022-04-24 RX ADMIN — Medication 975 MILLIGRAM(S): at 22:00

## 2022-04-24 RX ADMIN — ENOXAPARIN SODIUM 30 MILLIGRAM(S): 100 INJECTION SUBCUTANEOUS at 21:22

## 2022-04-24 RX ADMIN — Medication 975 MILLIGRAM(S): at 13:59

## 2022-04-24 RX ADMIN — PREGABALIN 1000 MICROGRAM(S): 225 CAPSULE ORAL at 11:52

## 2022-04-24 RX ADMIN — POLYETHYLENE GLYCOL 3350 17 GRAM(S): 17 POWDER, FOR SOLUTION ORAL at 11:52

## 2022-04-24 RX ADMIN — Medication 100 MILLIGRAM(S): at 06:28

## 2022-04-24 RX ADMIN — Medication 1000 UNIT(S): at 11:52

## 2022-04-24 RX ADMIN — CHLORHEXIDINE GLUCONATE 1 APPLICATION(S): 213 SOLUTION TOPICAL at 17:21

## 2022-04-24 RX ADMIN — SODIUM CHLORIDE 50 MILLILITER(S): 9 INJECTION, SOLUTION INTRAVENOUS at 11:48

## 2022-04-24 RX ADMIN — Medication 975 MILLIGRAM(S): at 06:25

## 2022-04-24 RX ADMIN — LOSARTAN POTASSIUM 100 MILLIGRAM(S): 100 TABLET, FILM COATED ORAL at 06:25

## 2022-04-24 RX ADMIN — Medication 975 MILLIGRAM(S): at 21:22

## 2022-04-24 NOTE — PROGRESS NOTE ADULT - SUBJECTIVE AND OBJECTIVE BOX
Roland Alcaraz MD  Division of Hospital Medicine  Pager: [phone]  If no response or off-hours, page [phone]  -------------------------------------    Patient is a 89y old  Female who presents with a chief complaint of S/P fall one week ago with persistent RIGHT hip pain (24 Apr 2022 03:42)      SUBJECTIVE / OVERNIGHT EVENTS: pt s/p R hip surgery yesterday, tolerated well  ADDITIONAL REVIEW OF SYSTEMS: denies acute pains, no fevers/chills, no cough/sob/cp    MEDICATIONS  (STANDING):  acetaminophen     Tablet .. 975 milliGRAM(s) Oral every 8 hours  chlorhexidine 2% Cloths 1 Application(s) Topical daily  cholecalciferol 1000 Unit(s) Oral daily  cyanocobalamin 1000 MICROGram(s) Oral daily  enoxaparin Injectable 30 milliGRAM(s) SubCutaneous every 24 hours  lactated ringers. 500 milliLiter(s) (50 mL/Hr) IV Continuous <Continuous>  losartan 100 milliGRAM(s) Oral daily  polyethylene glycol 3350 17 Gram(s) Oral daily  senna 2 Tablet(s) Oral at bedtime    MEDICATIONS  (PRN):  acetaminophen     Tablet .. 650 milliGRAM(s) Oral every 6 hours PRN Temp greater or equal to 38C (100.4F), Mild Pain (1 - 3)  magnesium hydroxide Suspension 30 milliLiter(s) Oral daily PRN Constipation  oxyCODONE    IR 2.5 milliGRAM(s) Oral every 4 hours PRN Moderate Pain (4 - 6)  oxyCODONE    IR 5 milliGRAM(s) Oral every 4 hours PRN Severe Pain (7 - 10)      CAPILLARY BLOOD GLUCOSE        I&O's Summary    23 Apr 2022 07:01  -  24 Apr 2022 07:00  --------------------------------------------------------  IN: 600 mL / OUT: 400 mL / NET: 200 mL    24 Apr 2022 07:01  -  24 Apr 2022 10:22  --------------------------------------------------------  IN: 220 mL / OUT: 0 mL / NET: 220 mL        PHYSICAL EXAM:  Vital Signs Last 24 Hrs  T(C): 36.7 (24 Apr 2022 08:44), Max: 36.7 (23 Apr 2022 19:45)  T(F): 98.1 (24 Apr 2022 08:44), Max: 98.1 (24 Apr 2022 08:44)  HR: 68 (24 Apr 2022 08:44) (64 - 92)  BP: 133/64 (24 Apr 2022 08:44) (124/64 - 170/80)  BP(mean): --  RR: 18 (24 Apr 2022 08:44) (15 - 18)  SpO2: 98% (24 Apr 2022 08:44) (98% - 100%)  CONSTITUTIONAL: NAD, well-developed, well-groomed  EYES: PERRLA; conjunctiva and sclera clear  ENMT: Moist oral mucosa, no pharyngeal injection or exudates; normal dentition  NECK: Supple, no palpable masses; no thyromegaly  RESPIRATORY: Normal respiratory effort; lungs are clear to auscultation bilaterally  CARDIOVASCULAR: Regular rate and rhythm, normal S1 and S2, no murmur/rub/gallop; No lower extremity edema; Peripheral pulses are 2+ bilaterally  ABDOMEN: Nontender to palpation, normoactive bowel sounds, no rebound/guarding; No hepatosplenomegaly  MUSCLOSKELETAL:  +R hip dressings mild oozing  PSYCH: A+O to person, place, and time; affect appropriate  NEUROLOGY: CN 2-12 are intact and symmetric; no gross sensory deficits;   SKIN: No rashes; no palpable lesions    LABS:                        9.7    10.76 )-----------( 308      ( 24 Apr 2022 07:35 )             31.0     04-24    138  |  104  |  33<H>  ----------------------------<  101<H>  5.3   |  18<L>  |  1.54<H>    Ca    8.8      24 Apr 2022 07:35    TPro  6.6  /  Alb  3.6  /  TBili  0.3  /  DBili  x   /  AST  12  /  ALT  6<L>  /  AlkPhos  79  04-22    PT/INR - ( 23 Apr 2022 12:36 )   PT: 11.4 sec;   INR: 0.98 ratio         PTT - ( 23 Apr 2022 12:36 )  PTT:31.3 sec            RADIOLOGY & ADDITIONAL TESTS:  Results Reviewed:   Imaging Personally Reviewed:  Electrocardiogram Personally Reviewed:    COORDINATION OF CARE:  Care Discussed with Consultants/Other Providers [Y/N]: ortho, radiology  Prior or Outpatient Records Reviewed [Y/N]:

## 2022-04-24 NOTE — PHYSICAL THERAPY INITIAL EVALUATION ADULT - PLANNED THERAPY INTERVENTIONS, PT EVAL
GOAL: Pt will ascend/descend (6) steps (I) with U HR and step over step pattern in 4 weeks./balance training/bed mobility training/gait training/strengthening/transfer training

## 2022-04-24 NOTE — PHYSICAL THERAPY INITIAL EVALUATION ADULT - PERTINENT HX OF CURRENT PROBLEM, REHAB EVAL
89 y/o F hx HTN, previous spinal column tumor resected 2 years ago, L hip replacement, here now with R hip pain after mechanical fall. Patient lives alone and apparently with an episode last Friday at home when the patient was opening a door with the patient losing her balance with presumed mechanical fall with RIGHT hip pain and unable to weight bear.

## 2022-04-24 NOTE — PROGRESS NOTE ADULT - SUBJECTIVE AND OBJECTIVE BOX
Pt seen/examined. Doing well. Pain controlled. No acute overnight complaints or events.    T(C): 36.7 (04-23-22 @ 22:45), Max: 36.7 (04-23-22 @ 07:54)  HR: 67 (04-23-22 @ 22:45) (62 - 92)  BP: 128/58 (04-23-22 @ 22:45) (124/64 - 170/80)  RR: 17 (04-23-22 @ 22:45) (15 - 18)  SpO2: 100% (04-23-22 @ 22:45) (98% - 100%)  Wt(kg): --  - Gen: NAD    PE  Gen: NAD, alert and oriented  Resp: Unlabored breathing  RLE: Skin intact, no ecchymosis, dressing clean dry intact       SILT DP/SP/ Xiomara/Saph/tib       +EHL/FHL/TA/Gastroc,        DP+,        soft compartments, no calf ttp         89yFemale s/p IMN POD1. Recovering appropriately.     - Pain control  - mechanical/DVT ppx  - f/u AM labs  - OOB/PT

## 2022-04-24 NOTE — PHYSICAL THERAPY INITIAL EVALUATION ADULT - GAIT DEVIATIONS NOTED, PT EVAL
decreased shelley/increased time in double stance/decreased step length/decreased stride length/decreased weight-shifting ability

## 2022-04-24 NOTE — PHYSICAL THERAPY INITIAL EVALUATION ADULT - PRECAUTIONS/LIMITATIONS, REHAB EVAL
Pt was seen at an urgent care center--reported negative film for a RIGHT hip fracture per patient--with patient discharged from the urgent care center to home but with still persistent RIGHT hip pain for the past week and unable to weight bear.  Pt was found to have R trochanter fx. Now s/p OR for R hip IMN on 4/23, incidentally covid positive, asymptomatic./fall precautions

## 2022-04-24 NOTE — OCCUPATIONAL THERAPY INITIAL EVALUATION ADULT - PERTINENT HX OF CURRENT PROBLEM, REHAB EVAL
89 y/o F hx HTN, previous spinal column tumor resected 2 years ago, L hip replacement, here now with R hip pain after mechanical fall. Patient lives alone and apparently with an episode last Friday at home when the patient was opening a door with the patient losing her balance with presumed mechanical fall with R hip pain and unable to weight bear.

## 2022-04-24 NOTE — PHYSICAL THERAPY INITIAL EVALUATION ADULT - ADDITIONAL COMMENTS
Pt lives alone in a PH +6 SELINA and a few steps to basement with walk-in shower. Pt was (I) with all ADLS and ambulated without an AD PTA. Pt very active, completes IADLs independently (cooking, cleaning).

## 2022-04-24 NOTE — OCCUPATIONAL THERAPY INITIAL EVALUATION ADULT - PRECAUTIONS/LIMITATIONS, REHAB EVAL
Pt was seen at an urgent care center--reported negative film for a R hip fracture per patient--with patient discharged from the urgent care center to home but with still persistent R hip pain for the past week and unable to weight bear.  Pt was found to have R trochanter fx. Now s/p OR for R hip IMN on 4/23, incidentally covid positive, asymptomatic./fall precautions

## 2022-04-24 NOTE — OCCUPATIONAL THERAPY INITIAL EVALUATION ADULT - LIVES WITH, PROFILE
Pt lives alone in PH +6 SEL[place] and a few steps to basement with walk-in shower. Pt reports independence with self care and functional mobility without AD. Pt very active, completes IADLs independently (cooking, cleaning)./alone

## 2022-04-25 LAB
ANION GAP SERPL CALC-SCNC: 13 MMOL/L — SIGNIFICANT CHANGE UP (ref 5–17)
BUN SERPL-MCNC: 41 MG/DL — HIGH (ref 7–23)
CALCIUM SERPL-MCNC: 8.4 MG/DL — SIGNIFICANT CHANGE UP (ref 8.4–10.5)
CHLORIDE SERPL-SCNC: 105 MMOL/L — SIGNIFICANT CHANGE UP (ref 96–108)
CO2 SERPL-SCNC: 20 MMOL/L — LOW (ref 22–31)
CREAT ?TM UR-MCNC: 26 MG/DL — SIGNIFICANT CHANGE UP
CREAT SERPL-MCNC: 1.57 MG/DL — HIGH (ref 0.5–1.3)
EGFR: 30 ML/MIN/1.73M2 — LOW
GLUCOSE SERPL-MCNC: 98 MG/DL — SIGNIFICANT CHANGE UP (ref 70–99)
HCT VFR BLD CALC: 29.7 % — LOW (ref 34.5–45)
HGB BLD-MCNC: 9.4 G/DL — LOW (ref 11.5–15.5)
MCHC RBC-ENTMCNC: 29.7 PG — SIGNIFICANT CHANGE UP (ref 27–34)
MCHC RBC-ENTMCNC: 31.6 GM/DL — LOW (ref 32–36)
MCV RBC AUTO: 94 FL — SIGNIFICANT CHANGE UP (ref 80–100)
NRBC # BLD: 0 /100 WBCS — SIGNIFICANT CHANGE UP (ref 0–0)
PLATELET # BLD AUTO: 252 K/UL — SIGNIFICANT CHANGE UP (ref 150–400)
POTASSIUM SERPL-MCNC: 4.7 MMOL/L — SIGNIFICANT CHANGE UP (ref 3.5–5.3)
POTASSIUM SERPL-SCNC: 4.7 MMOL/L — SIGNIFICANT CHANGE UP (ref 3.5–5.3)
RBC # BLD: 3.16 M/UL — LOW (ref 3.8–5.2)
RBC # FLD: 14.1 % — SIGNIFICANT CHANGE UP (ref 10.3–14.5)
SODIUM SERPL-SCNC: 138 MMOL/L — SIGNIFICANT CHANGE UP (ref 135–145)
SODIUM UR-SCNC: 88 MMOL/L — SIGNIFICANT CHANGE UP
WBC # BLD: 10.94 K/UL — HIGH (ref 3.8–10.5)
WBC # FLD AUTO: 10.94 K/UL — HIGH (ref 3.8–10.5)

## 2022-04-25 PROCEDURE — 99232 SBSQ HOSP IP/OBS MODERATE 35: CPT

## 2022-04-25 RX ADMIN — LOSARTAN POTASSIUM 100 MILLIGRAM(S): 100 TABLET, FILM COATED ORAL at 05:13

## 2022-04-25 RX ADMIN — Medication 975 MILLIGRAM(S): at 13:45

## 2022-04-25 RX ADMIN — ENOXAPARIN SODIUM 30 MILLIGRAM(S): 100 INJECTION SUBCUTANEOUS at 21:11

## 2022-04-25 RX ADMIN — Medication 975 MILLIGRAM(S): at 06:16

## 2022-04-25 RX ADMIN — PREGABALIN 1000 MICROGRAM(S): 225 CAPSULE ORAL at 11:07

## 2022-04-25 RX ADMIN — Medication 975 MILLIGRAM(S): at 22:06

## 2022-04-25 RX ADMIN — Medication 975 MILLIGRAM(S): at 05:13

## 2022-04-25 RX ADMIN — Medication 1000 UNIT(S): at 11:07

## 2022-04-25 RX ADMIN — Medication 975 MILLIGRAM(S): at 21:11

## 2022-04-25 RX ADMIN — CHLORHEXIDINE GLUCONATE 1 APPLICATION(S): 213 SOLUTION TOPICAL at 11:08

## 2022-04-25 RX ADMIN — POLYETHYLENE GLYCOL 3350 17 GRAM(S): 17 POWDER, FOR SOLUTION ORAL at 11:07

## 2022-04-25 NOTE — PROGRESS NOTE ADULT - SUBJECTIVE AND OBJECTIVE BOX
[facility] Division of Hospital Medicine  Abdirashid Aranda MD  Pager (FANNY-F, 8A-5P): [phone]  Other Times:  [phone]    Patient is a 89y old  Female who presents with a chief complaint of S/P fall one week ago with persistent RIGHT hip pain (25 Apr 2022 06:56)    SUBJECTIVE / OVERNIGHT EVENTS: no events, no new complaints some soreness at surgical site  ADDITIONAL REVIEW OF SYSTEMS:    MEDICATIONS  (STANDING):  acetaminophen     Tablet .. 975 milliGRAM(s) Oral every 8 hours  chlorhexidine 2% Cloths 1 Application(s) Topical daily  cholecalciferol 1000 Unit(s) Oral daily  cyanocobalamin 1000 MICROGram(s) Oral daily  enoxaparin Injectable 30 milliGRAM(s) SubCutaneous every 24 hours  lactated ringers. 500 milliLiter(s) (50 mL/Hr) IV Continuous <Continuous>  losartan 100 milliGRAM(s) Oral daily  polyethylene glycol 3350 17 Gram(s) Oral daily  senna 2 Tablet(s) Oral at bedtime    MEDICATIONS  (PRN):  acetaminophen     Tablet .. 650 milliGRAM(s) Oral every 6 hours PRN Temp greater or equal to 38C (100.4F), Mild Pain (1 - 3)  bisacodyl Suppository 10 milliGRAM(s) Rectal daily PRN If no bowel movement  magnesium hydroxide Suspension 30 milliLiter(s) Oral daily PRN Constipation  oxyCODONE    IR 2.5 milliGRAM(s) Oral every 4 hours PRN Moderate Pain (4 - 6)  oxyCODONE    IR 5 milliGRAM(s) Oral every 4 hours PRN Severe Pain (7 - 10)      CAPILLARY BLOOD GLUCOSE        I&O's Summary    24 Apr 2022 07:01  -  25 Apr 2022 07:00  --------------------------------------------------------  IN: 700 mL / OUT: 950 mL / NET: -250 mL    25 Apr 2022 07:01  -  25 Apr 2022 14:43  --------------------------------------------------------  IN: 500 mL / OUT: 550 mL / NET: -50 mL        PHYSICAL EXAM:  Vital Signs Last 24 Hrs  T(C): 36.6 (25 Apr 2022 08:40), Max: 36.7 (25 Apr 2022 00:59)  T(F): 97.8 (25 Apr 2022 08:40), Max: 98 (25 Apr 2022 00:59)  HR: 83 (25 Apr 2022 08:40) (74 - 92)  BP: 112/64 (25 Apr 2022 08:40) (112/46 - 144/54)  BP(mean): --  RR: 18 (25 Apr 2022 08:40) (18 - 18)  SpO2: 98% (25 Apr 2022 08:40) (93% - 99%)  CONSTITUTIONAL: NAD, well-developed, well-groomed  EYES: PERRLA; conjunctiva and sclera clear  ENMT: Moist oral mucosa, no pharyngeal injection or exudates; normal dentition  NECK: Supple, no palpable masses; no thyromegaly  RESPIRATORY: Normal respiratory effort; lungs are clear to auscultation bilaterally  CARDIOVASCULAR: Regular rate and rhythm, normal S1 and S2, no murmur/rub/gallop; No lower extremity edema; Peripheral pulses are 2+ bilaterally  ABDOMEN: Nontender to palpation, normoactive bowel sounds, no rebound/guarding; No hepatosplenomegaly  MUSCLOSKELETAL:  +R hip dressings clean  PSYCH: A+O to person, place, and time; affect appropriate  NEUROLOGY: CN 2-12 are intact and symmetric; no gross sensory deficits;   SKIN: No rashes; no palpable lesions    LABS:                        9.4    10.94 )-----------( 252      ( 25 Apr 2022 07:13 )             29.7     04-25    138  |  105  |  41<H>  ----------------------------<  98  4.7   |  20<L>  |  1.57<H>    Ca    8.4      25 Apr 2022 07:12                  RADIOLOGY & ADDITIONAL TESTS:  Results Reviewed:   Imaging Personally Reviewed:  Electrocardiogram Personally Reviewed:    COORDINATION OF CARE:  Care Discussed with Consultants/Other Providers [Y/N]:  Prior or Outpatient Records Reviewed [Y/N]:

## 2022-04-25 NOTE — PROGRESS NOTE ADULT - SUBJECTIVE AND OBJECTIVE BOX
Pt seen/examined. Doing well. Pain controlled. No acute overnight complaints or events. Has been OOB to chair.     Vital Signs Last 24 Hrs  T(C): 36.4 (25 Apr 2022 04:21), Max: 36.7 (24 Apr 2022 08:44)  T(F): 97.5 (25 Apr 2022 04:21), Max: 98.1 (24 Apr 2022 08:44)  HR: 92 (25 Apr 2022 05:10) (68 - 92)  BP: 144/54 (25 Apr 2022 05:10) (112/46 - 144/54)  BP(mean): --  RR: 18 (25 Apr 2022 05:10) (18 - 18)  SpO2: 99% (25 Apr 2022 05:10) (93% - 99%)    PE  Gen: NAD, alert and oriented  Resp: Unlabored breathing  RLE: Skin intact, no ecchymosis, distal dressing with small stable area of strikethrough       SILT DP/SP/ Xiomara/Saph/tib       +EHL/FHL/TA/Gastroc,        DP+,        soft compartments, no calf ttp         89yFemale s/p IMN for R GT fx POD2. Recovering appropriately.     - Pain control  - WBAT RLE  - DVT ppx: lovenox  - f/u AM labs  - OOB/PT  - Dispo planning: [place]

## 2022-04-26 LAB
ANION GAP SERPL CALC-SCNC: 11 MMOL/L — SIGNIFICANT CHANGE UP (ref 5–17)
BUN SERPL-MCNC: 35 MG/DL — HIGH (ref 7–23)
CALCIUM SERPL-MCNC: 8.5 MG/DL — SIGNIFICANT CHANGE UP (ref 8.4–10.5)
CHLORIDE SERPL-SCNC: 105 MMOL/L — SIGNIFICANT CHANGE UP (ref 96–108)
CO2 SERPL-SCNC: 23 MMOL/L — SIGNIFICANT CHANGE UP (ref 22–31)
CREAT SERPL-MCNC: 1.3 MG/DL — SIGNIFICANT CHANGE UP (ref 0.5–1.3)
EGFR: 38 ML/MIN/1.73M2 — LOW
GLUCOSE SERPL-MCNC: 90 MG/DL — SIGNIFICANT CHANGE UP (ref 70–99)
HCT VFR BLD CALC: 30.3 % — LOW (ref 34.5–45)
HGB BLD-MCNC: 9.4 G/DL — LOW (ref 11.5–15.5)
MCHC RBC-ENTMCNC: 29.5 PG — SIGNIFICANT CHANGE UP (ref 27–34)
MCHC RBC-ENTMCNC: 31 GM/DL — LOW (ref 32–36)
MCV RBC AUTO: 95 FL — SIGNIFICANT CHANGE UP (ref 80–100)
NRBC # BLD: 0 /100 WBCS — SIGNIFICANT CHANGE UP (ref 0–0)
PLATELET # BLD AUTO: 285 K/UL — SIGNIFICANT CHANGE UP (ref 150–400)
POTASSIUM SERPL-MCNC: 4.9 MMOL/L — SIGNIFICANT CHANGE UP (ref 3.5–5.3)
POTASSIUM SERPL-SCNC: 4.9 MMOL/L — SIGNIFICANT CHANGE UP (ref 3.5–5.3)
RBC # BLD: 3.19 M/UL — LOW (ref 3.8–5.2)
RBC # FLD: 14.2 % — SIGNIFICANT CHANGE UP (ref 10.3–14.5)
SODIUM SERPL-SCNC: 139 MMOL/L — SIGNIFICANT CHANGE UP (ref 135–145)
WBC # BLD: 11.07 K/UL — HIGH (ref 3.8–10.5)
WBC # FLD AUTO: 11.07 K/UL — HIGH (ref 3.8–10.5)

## 2022-04-26 PROCEDURE — 99232 SBSQ HOSP IP/OBS MODERATE 35: CPT

## 2022-04-26 RX ADMIN — Medication 1000 UNIT(S): at 12:03

## 2022-04-26 RX ADMIN — Medication 975 MILLIGRAM(S): at 13:23

## 2022-04-26 RX ADMIN — Medication 975 MILLIGRAM(S): at 23:20

## 2022-04-26 RX ADMIN — CHLORHEXIDINE GLUCONATE 1 APPLICATION(S): 213 SOLUTION TOPICAL at 12:05

## 2022-04-26 RX ADMIN — Medication 975 MILLIGRAM(S): at 06:16

## 2022-04-26 RX ADMIN — Medication 975 MILLIGRAM(S): at 14:11

## 2022-04-26 RX ADMIN — Medication 975 MILLIGRAM(S): at 22:27

## 2022-04-26 RX ADMIN — Medication 975 MILLIGRAM(S): at 06:45

## 2022-04-26 RX ADMIN — PREGABALIN 1000 MICROGRAM(S): 225 CAPSULE ORAL at 12:03

## 2022-04-26 RX ADMIN — ENOXAPARIN SODIUM 30 MILLIGRAM(S): 100 INJECTION SUBCUTANEOUS at 22:27

## 2022-04-26 RX ADMIN — SENNA PLUS 2 TABLET(S): 8.6 TABLET ORAL at 22:27

## 2022-04-26 NOTE — PROGRESS NOTE ADULT - SUBJECTIVE AND OBJECTIVE BOX
Pt seen/examined. Doing well. Pain controlled. No acute overnight complaints or events.     LABS:                        9.4    10.94 )-----------( 252      ( 25 Apr 2022 07:13 )             29.7     04-25    138  |  105  |  41<H>  ----------------------------<  98  4.7   |  20<L>  |  1.57<H>    Ca    8.4      25 Apr 2022 07:12            VITAL SIGNS:  T(C): 37.2 (04-25-22 @ 21:18), Max: 37.5 (04-25-22 @ 16:39)  HR: 98 (04-25-22 @ 21:18) (83 - 98)  BP: 141/73 (04-25-22 @ 21:18) (112/64 - 155/67)  RR: 18 (04-25-22 @ 21:18) (18 - 18)  SpO2: 93% (04-25-22 @ 21:18) (93% - 98%)    PE  Gen: NAD, alert and oriented  Resp: Unlabored breathing  RLE: Skin intact, no ecchymosis, distal dressing with small stable area of strikethrough       SILT DP/SP/ Xiomara/Saph/tib       +EHL/FHL/TA/Gastroc,        DP+,        soft compartments, no calf ttp         89yFemale s/p IMN for R GT fx POD3. Recovering appropriately.     - Pain control  - WBAT RLE  - DVT ppx: lovenox  - f/u AM labs  - OOB/PT  - Dispo planning: [facility]  - No further acute orthopaedic surgical intervention indicated at this time. This patient is orthopaedically stable for discharge.   - Patient to follow up with Dr. Burdick as an outpatient in 10-14 days for further evaluation and management.      Pt seen/examined. Doing well. Pain controlled. No acute overnight complaints or events.     LABS:                        9.4    10.94 )-----------( 252      ( 25 Apr 2022 07:13 )             29.7     04-25    138  |  105  |  41<H>  ----------------------------<  98  4.7   |  20<L>  |  1.57<H>    Ca    8.4      25 Apr 2022 07:12            VITAL SIGNS:  T(C): 37.2 (04-25-22 @ 21:18), Max: 37.5 (04-25-22 @ 16:39)  HR: 98 (04-25-22 @ 21:18) (83 - 98)  BP: 141/73 (04-25-22 @ 21:18) (112/64 - 155/67)  RR: 18 (04-25-22 @ 21:18) (18 - 18)  SpO2: 93% (04-25-22 @ 21:18) (93% - 98%)    PE  Gen: NAD, alert and oriented  Resp: Unlabored breathing  RLE: Skin intact, no ecchymosis, distal dressing with small stable area of strikethrough       SILT DP/SP/ Xiomara/Saph/tib       +EHL/FHL/TA/Gastroc,        DP+,        soft compartments, no calf ttp         89yFemale s/p IMN for R GT fx POD3. Recovering appropriately.     - Pain control  - WBAT RLE  - DVT ppx: lovenox  - f/u AM labs  - OOB/PT  - Dispo planning: [facility]  - No further acute orthopaedic surgical intervention indicated at this time. This patient is orthopaedically stable for discharge.   - Any sutures/staples will need to be removed POD#14  - Patient to follow up with Dr. Burdick as an outpatient in 10-14 days for further evaluation and management.

## 2022-04-26 NOTE — PROGRESS NOTE ADULT - SUBJECTIVE AND OBJECTIVE BOX
[facility] Division of Hospital Medicine  Abdirashid Aranda MD  Pager (M-F, 8A-5P): [phone]  Other Times:  [phone]    Patient is a 89y old  Female who presents with a chief complaint of S/P fall one week ago with persistent RIGHT hip pain (26 Apr 2022 06:34)    SUBJECTIVE / OVERNIGHT EVENTS: pt feels well moving to bathroom, minimal pain, no covid symptoms  ADDITIONAL REVIEW OF SYSTEMS:    MEDICATIONS  (STANDING):  acetaminophen     Tablet .. 975 milliGRAM(s) Oral every 8 hours  chlorhexidine 2% Cloths 1 Application(s) Topical daily  cholecalciferol 1000 Unit(s) Oral daily  cyanocobalamin 1000 MICROGram(s) Oral daily  enoxaparin Injectable 30 milliGRAM(s) SubCutaneous every 24 hours  polyethylene glycol 3350 17 Gram(s) Oral daily  senna 2 Tablet(s) Oral at bedtime    MEDICATIONS  (PRN):  acetaminophen     Tablet .. 650 milliGRAM(s) Oral every 6 hours PRN Temp greater or equal to 38C (100.4F), Mild Pain (1 - 3)  bisacodyl Suppository 10 milliGRAM(s) Rectal daily PRN If no bowel movement  magnesium hydroxide Suspension 30 milliLiter(s) Oral daily PRN Constipation  oxyCODONE    IR 2.5 milliGRAM(s) Oral every 4 hours PRN Moderate Pain (4 - 6)  oxyCODONE    IR 5 milliGRAM(s) Oral every 4 hours PRN Severe Pain (7 - 10)      CAPILLARY BLOOD GLUCOSE        I&O's Summary    25 Apr 2022 07:01  -  26 Apr 2022 07:00  --------------------------------------------------------  IN: 500 mL / OUT: 850 mL / NET: -350 mL    26 Apr 2022 07:01  -  26 Apr 2022 16:08  --------------------------------------------------------  IN: 700 mL / OUT: 0 mL / NET: 700 mL    PHYSICAL EXAM:  Vital Signs Last 24 Hrs  T(C): 36.6 (26 Apr 2022 09:15), Max: 37.5 (25 Apr 2022 16:39)  T(F): 97.8 (26 Apr 2022 09:15), Max: 99.5 (25 Apr 2022 16:39)  HR: 89 (26 Apr 2022 15:34) (89 - 98)  BP: 126/60 (26 Apr 2022 15:34) (101/45 - 155/67)  BP(mean): --  RR: 18 (26 Apr 2022 15:34) (18 - 18)  SpO2: 98% (26 Apr 2022 15:34) (93% - 98%)  CONSTITUTIONAL: NAD, well-developed, well-groomed  EYES: PERRLA; conjunctiva and sclera clear  ENMT: Moist oral mucosa, no pharyngeal injection or exudates; normal dentition  NECK: Supple, no palpable masses; no thyromegaly  RESPIRATORY: Normal respiratory effort; lungs are clear to auscultation bilaterally  CARDIOVASCULAR: Regular rate and rhythm, normal S1 and S2, no murmur/rub/gallop; No lower extremity edema; Peripheral pulses are 2+ bilaterally  ABDOMEN: Nontender to palpation, normoactive bowel sounds, no rebound/guarding; No hepatosplenomegaly  MUSCLOSKELETAL:  +R hip dressings clean  PSYCH: A+O to person, place, and time; affect appropriate  NEUROLOGY: CN 2-12 are intact and symmetric; no gross sensory deficits;   SKIN: No rashes; no palpable lesions    LABS:                        9.4    11.07 )-----------( 285      ( 26 Apr 2022 07:07 )             30.3     04-26    139  |  105  |  35<H>  ----------------------------<  90  4.9   |  23  |  1.30    Ca    8.5      26 Apr 2022 06:54                  RADIOLOGY & ADDITIONAL TESTS:  Results Reviewed:   Imaging Personally Reviewed:  Electrocardiogram Personally Reviewed:    COORDINATION OF CARE:  Care Discussed with Consultants/Other Providers [Y/N]:  Prior or Outpatient Records Reviewed [Y/N]:

## 2022-04-27 LAB — SARS-COV-2 RNA SPEC QL NAA+PROBE: DETECTED

## 2022-04-27 PROCEDURE — 99232 SBSQ HOSP IP/OBS MODERATE 35: CPT

## 2022-04-27 RX ADMIN — PREGABALIN 1000 MICROGRAM(S): 225 CAPSULE ORAL at 13:05

## 2022-04-27 RX ADMIN — SENNA PLUS 2 TABLET(S): 8.6 TABLET ORAL at 22:49

## 2022-04-27 RX ADMIN — Medication 975 MILLIGRAM(S): at 22:49

## 2022-04-27 RX ADMIN — Medication 975 MILLIGRAM(S): at 05:12

## 2022-04-27 RX ADMIN — Medication 975 MILLIGRAM(S): at 23:19

## 2022-04-27 RX ADMIN — ENOXAPARIN SODIUM 30 MILLIGRAM(S): 100 INJECTION SUBCUTANEOUS at 22:48

## 2022-04-27 RX ADMIN — Medication 1000 UNIT(S): at 13:04

## 2022-04-27 RX ADMIN — Medication 975 MILLIGRAM(S): at 13:05

## 2022-04-27 RX ADMIN — Medication 975 MILLIGRAM(S): at 06:00

## 2022-04-27 RX ADMIN — CHLORHEXIDINE GLUCONATE 1 APPLICATION(S): 213 SOLUTION TOPICAL at 13:08

## 2022-04-27 RX ADMIN — Medication 975 MILLIGRAM(S): at 14:00

## 2022-04-27 NOTE — PROGRESS NOTE ADULT - SUBJECTIVE AND OBJECTIVE BOX
[facility] Division of Hospital Medicine  Abdirashid Aranda MD  Pager (M-F, 8A-5P): [phone]  Other Times:  [phone]    Patient is a 89y old  Female who presents with a chief complaint of S/P fall one week ago with persistent RIGHT hip pain (26 Apr 2022 16:08)    SUBJECTIVE / OVERNIGHT EVENTS:   ADDITIONAL REVIEW OF SYSTEMS:    MEDICATIONS  (STANDING):  acetaminophen     Tablet .. 975 milliGRAM(s) Oral every 8 hours  chlorhexidine 2% Cloths 1 Application(s) Topical daily  cholecalciferol 1000 Unit(s) Oral daily  cyanocobalamin 1000 MICROGram(s) Oral daily  enoxaparin Injectable 30 milliGRAM(s) SubCutaneous every 24 hours  polyethylene glycol 3350 17 Gram(s) Oral daily  senna 2 Tablet(s) Oral at bedtime    MEDICATIONS  (PRN):  acetaminophen     Tablet .. 650 milliGRAM(s) Oral every 6 hours PRN Temp greater or equal to 38C (100.4F), Mild Pain (1 - 3)  bisacodyl Suppository 10 milliGRAM(s) Rectal daily PRN If no bowel movement  magnesium hydroxide Suspension 30 milliLiter(s) Oral daily PRN Constipation  oxyCODONE    IR 2.5 milliGRAM(s) Oral every 4 hours PRN Moderate Pain (4 - 6)  oxyCODONE    IR 5 milliGRAM(s) Oral every 4 hours PRN Severe Pain (7 - 10)      CAPILLARY BLOOD GLUCOSE        I&O's Summary    26 Apr 2022 07:01  -  27 Apr 2022 07:00  --------------------------------------------------------  IN: 920 mL / OUT: 450 mL / NET: 470 mL        PHYSICAL EXAM:  Vital Signs Last 24 Hrs  T(C): 36.6 (27 Apr 2022 09:27), Max: 36.6 (26 Apr 2022 17:08)  T(F): 97.8 (27 Apr 2022 09:27), Max: 97.9 (26 Apr 2022 17:08)  HR: 83 (27 Apr 2022 09:27) (76 - 89)  BP: 140/96 (27 Apr 2022 09:27) (99/73 - 142/66)  BP(mean): 82 (26 Apr 2022 17:08) (82 - 82)  RR: 18 (27 Apr 2022 09:27) (16 - 18)  SpO2: 100% (27 Apr 2022 09:27) (96% - 100%)  CONSTITUTIONAL: NAD, well-developed, well-groomed  EYES: conjunctiva and sclera clear  ENMT: Moist oral mucosa, no pharyngeal injection or exudates; normal dentition  RESPIRATORY: Normal respiratory effort; lungs are clear to auscultation bilaterally  CARDIOVASCULAR: Regular rate and rhythm, normal S1 and S2, no murmur; No lower extremity edema;   ABDOMEN: Nontender to palpation, normoactive bowel sounds, no rebound/guarding;  MUSCULOSKELETAL:  LEAL no tenderness  PSYCH: A+O to person, place, and time; affect appropriate  NEUROLOGY: grossly nonfocal upper/lower extremity, conversational  SKIN: No rashes; no palpable lesions    LABS:                        9.4    11.07 )-----------( 285      ( 26 Apr 2022 07:07 )             30.3     04-26    139  |  105  |  35<H>  ----------------------------<  90  4.9   |  23  |  1.30    Ca    8.5      26 Apr 2022 06:54                  RADIOLOGY & ADDITIONAL TESTS:  Results Reviewed:   Imaging Personally Reviewed:  Electrocardiogram Personally Reviewed:    COORDINATION OF CARE:  Care Discussed with Consultants/Other Providers [Y/N]:  Prior or Outpatient Records Reviewed [Y/N]:   [facility] Division of Hospital Medicine  Abdirashid Aranda MD  Pager (M-F, 8A-5P): [phone]  Other Times:  [phone]    Patient is a 89y old  Female who presents with a chief complaint of S/P fall one week ago with persistent RIGHT hip pain (26 Apr 2022 16:08)    SUBJECTIVE / OVERNIGHT EVENTS: feels well no complaints  ADDITIONAL REVIEW OF SYSTEMS:    MEDICATIONS  (STANDING):  acetaminophen     Tablet .. 975 milliGRAM(s) Oral every 8 hours  chlorhexidine 2% Cloths 1 Application(s) Topical daily  cholecalciferol 1000 Unit(s) Oral daily  cyanocobalamin 1000 MICROGram(s) Oral daily  enoxaparin Injectable 30 milliGRAM(s) SubCutaneous every 24 hours  polyethylene glycol 3350 17 Gram(s) Oral daily  senna 2 Tablet(s) Oral at bedtime    MEDICATIONS  (PRN):  acetaminophen     Tablet .. 650 milliGRAM(s) Oral every 6 hours PRN Temp greater or equal to 38C (100.4F), Mild Pain (1 - 3)  bisacodyl Suppository 10 milliGRAM(s) Rectal daily PRN If no bowel movement  magnesium hydroxide Suspension 30 milliLiter(s) Oral daily PRN Constipation  oxyCODONE    IR 2.5 milliGRAM(s) Oral every 4 hours PRN Moderate Pain (4 - 6)  oxyCODONE    IR 5 milliGRAM(s) Oral every 4 hours PRN Severe Pain (7 - 10)      CAPILLARY BLOOD GLUCOSE        I&O's Summary    26 Apr 2022 07:01  -  27 Apr 2022 07:00  --------------------------------------------------------  IN: 920 mL / OUT: 450 mL / NET: 470 mL        PHYSICAL EXAM:  Vital Signs Last 24 Hrs  T(C): 36.6 (27 Apr 2022 09:27), Max: 36.6 (26 Apr 2022 17:08)  T(F): 97.8 (27 Apr 2022 09:27), Max: 97.9 (26 Apr 2022 17:08)  HR: 83 (27 Apr 2022 09:27) (76 - 89)  BP: 140/96 (27 Apr 2022 09:27) (99/73 - 142/66)  BP(mean): 82 (26 Apr 2022 17:08) (82 - 82)  RR: 18 (27 Apr 2022 09:27) (16 - 18)  SpO2: 100% (27 Apr 2022 09:27) (96% - 100%)  CONSTITUTIONAL: NAD, well-developed, well-groomed  ENMT: Moist oral mucosa, no pharyngeal injection or exudates; normal dentition  RESPIRATORY: Normal respiratory effort; lungs are clear to auscultation bilaterally  CARDIOVASCULAR: Regular rate and rhythm, normal S1 and S2, no murmur/rub/gallop; No lower extremity edema; Peripheral pulses are 2+ bilaterally  ABDOMEN: Nontender to palpation, normoactive bowel sounds, no rebound/guarding; No hepatosplenomegaly  MUSCLOSKELETAL:  +R hip dressings clean  PSYCH: A+O to person, place, and time; affect appropriate    LABS:                        9.4    11.07 )-----------( 285      ( 26 Apr 2022 07:07 )             30.3     04-26    139  |  105  |  35<H>  ----------------------------<  90  4.9   |  23  |  1.30    Ca    8.5      26 Apr 2022 06:54                  RADIOLOGY & ADDITIONAL TESTS:  Results Reviewed:   Imaging Personally Reviewed:  Electrocardiogram Personally Reviewed:    COORDINATION OF CARE:  Care Discussed with Consultants/Other Providers [Y/N]:  Prior or Outpatient Records Reviewed [Y/N]:

## 2022-04-28 ENCOUNTER — TRANSCRIPTION ENCOUNTER (OUTPATIENT)
Age: 87
End: 2022-04-28

## 2022-04-28 LAB
ANION GAP SERPL CALC-SCNC: 11 MMOL/L — SIGNIFICANT CHANGE UP (ref 5–17)
BUN SERPL-MCNC: 31 MG/DL — HIGH (ref 7–23)
CALCIUM SERPL-MCNC: 8.8 MG/DL — SIGNIFICANT CHANGE UP (ref 8.4–10.5)
CHLORIDE SERPL-SCNC: 105 MMOL/L — SIGNIFICANT CHANGE UP (ref 96–108)
CO2 SERPL-SCNC: 21 MMOL/L — LOW (ref 22–31)
CREAT SERPL-MCNC: 1.13 MG/DL — SIGNIFICANT CHANGE UP (ref 0.5–1.3)
EGFR: 45 ML/MIN/1.73M2 — LOW
GLUCOSE SERPL-MCNC: 89 MG/DL — SIGNIFICANT CHANGE UP (ref 70–99)
HCT VFR BLD CALC: 29.6 % — LOW (ref 34.5–45)
HGB BLD-MCNC: 9.5 G/DL — LOW (ref 11.5–15.5)
MCHC RBC-ENTMCNC: 30.5 PG — SIGNIFICANT CHANGE UP (ref 27–34)
MCHC RBC-ENTMCNC: 32.1 GM/DL — SIGNIFICANT CHANGE UP (ref 32–36)
MCV RBC AUTO: 95.2 FL — SIGNIFICANT CHANGE UP (ref 80–100)
NRBC # BLD: 0 /100 WBCS — SIGNIFICANT CHANGE UP (ref 0–0)
PLATELET # BLD AUTO: 255 K/UL — SIGNIFICANT CHANGE UP (ref 150–400)
POTASSIUM SERPL-MCNC: 4.8 MMOL/L — SIGNIFICANT CHANGE UP (ref 3.5–5.3)
POTASSIUM SERPL-SCNC: 4.8 MMOL/L — SIGNIFICANT CHANGE UP (ref 3.5–5.3)
RBC # BLD: 3.11 M/UL — LOW (ref 3.8–5.2)
RBC # FLD: 14 % — SIGNIFICANT CHANGE UP (ref 10.3–14.5)
SODIUM SERPL-SCNC: 137 MMOL/L — SIGNIFICANT CHANGE UP (ref 135–145)
WBC # BLD: 8.12 K/UL — SIGNIFICANT CHANGE UP (ref 3.8–10.5)
WBC # FLD AUTO: 8.12 K/UL — SIGNIFICANT CHANGE UP (ref 3.8–10.5)

## 2022-04-28 PROCEDURE — 99232 SBSQ HOSP IP/OBS MODERATE 35: CPT

## 2022-04-28 RX ORDER — ENOXAPARIN SODIUM 100 MG/ML
30 INJECTION SUBCUTANEOUS
Qty: 0 | Refills: 0 | DISCHARGE
Start: 2022-04-28

## 2022-04-28 RX ORDER — POLYETHYLENE GLYCOL 3350 17 G/17G
17 POWDER, FOR SOLUTION ORAL
Qty: 0 | Refills: 0 | DISCHARGE
Start: 2022-04-28

## 2022-04-28 RX ORDER — CHOLECALCIFEROL (VITAMIN D3) 125 MCG
1 CAPSULE ORAL
Qty: 0 | Refills: 0 | DISCHARGE

## 2022-04-28 RX ORDER — CHOLECALCIFEROL (VITAMIN D3) 125 MCG
1000 CAPSULE ORAL
Qty: 0 | Refills: 0 | DISCHARGE
Start: 2022-04-28

## 2022-04-28 RX ORDER — PREGABALIN 225 MG/1
1 CAPSULE ORAL
Qty: 0 | Refills: 0 | DISCHARGE

## 2022-04-28 RX ORDER — ACETAMINOPHEN 500 MG
2 TABLET ORAL
Qty: 0 | Refills: 0 | DISCHARGE
Start: 2022-04-28

## 2022-04-28 RX ORDER — PREGABALIN 225 MG/1
1 CAPSULE ORAL
Qty: 0 | Refills: 0 | DISCHARGE
Start: 2022-04-28

## 2022-04-28 RX ORDER — LOSARTAN POTASSIUM 100 MG/1
1 TABLET, FILM COATED ORAL
Qty: 0 | Refills: 0 | DISCHARGE
Start: 2022-04-28

## 2022-04-28 RX ORDER — OXYCODONE HYDROCHLORIDE 5 MG/1
1 TABLET ORAL
Qty: 0 | Refills: 0 | DISCHARGE
Start: 2022-04-28

## 2022-04-28 RX ORDER — MAGNESIUM HYDROXIDE 400 MG/1
30 TABLET, CHEWABLE ORAL
Qty: 0 | Refills: 0 | DISCHARGE
Start: 2022-04-28

## 2022-04-28 RX ORDER — LOSARTAN POTASSIUM 100 MG/1
1 TABLET, FILM COATED ORAL
Qty: 0 | Refills: 0 | DISCHARGE

## 2022-04-28 RX ORDER — LOSARTAN POTASSIUM 100 MG/1
25 TABLET, FILM COATED ORAL DAILY
Refills: 0 | Status: DISCONTINUED | OUTPATIENT
Start: 2022-04-28 | End: 2022-04-29

## 2022-04-28 RX ORDER — SENNA PLUS 8.6 MG/1
2 TABLET ORAL
Qty: 0 | Refills: 0 | DISCHARGE
Start: 2022-04-28

## 2022-04-28 RX ORDER — OXYCODONE HYDROCHLORIDE 5 MG/1
2.5 TABLET ORAL
Qty: 0 | Refills: 0 | DISCHARGE
Start: 2022-04-28

## 2022-04-28 RX ADMIN — Medication 975 MILLIGRAM(S): at 13:45

## 2022-04-28 RX ADMIN — Medication 1000 UNIT(S): at 13:14

## 2022-04-28 RX ADMIN — PREGABALIN 1000 MICROGRAM(S): 225 CAPSULE ORAL at 13:14

## 2022-04-28 RX ADMIN — Medication 975 MILLIGRAM(S): at 06:40

## 2022-04-28 RX ADMIN — Medication 975 MILLIGRAM(S): at 21:26

## 2022-04-28 RX ADMIN — LOSARTAN POTASSIUM 25 MILLIGRAM(S): 100 TABLET, FILM COATED ORAL at 13:14

## 2022-04-28 RX ADMIN — Medication 975 MILLIGRAM(S): at 21:58

## 2022-04-28 RX ADMIN — CHLORHEXIDINE GLUCONATE 1 APPLICATION(S): 213 SOLUTION TOPICAL at 10:45

## 2022-04-28 RX ADMIN — ENOXAPARIN SODIUM 30 MILLIGRAM(S): 100 INJECTION SUBCUTANEOUS at 21:26

## 2022-04-28 RX ADMIN — Medication 975 MILLIGRAM(S): at 13:15

## 2022-04-28 RX ADMIN — Medication 975 MILLIGRAM(S): at 05:40

## 2022-04-28 NOTE — DISCHARGE NOTE PROVIDER - CARE PROVIDER_API CALL
Coy Burdick)  Orthopaedic Surgery  [address]  [place] [address]  Phone: [phone]  Fax: [phone]  Follow Up Time:

## 2022-04-28 NOTE — PROGRESS NOTE ADULT - PROBLEM SELECTOR PLAN 1
2/2 Greater Trochanter Fx   POD 1 s/p R hip IMN with ortho  - pain control and care as per ortho  dispo to [facility]
2/2 Greater Trochanter Fx   POD 1 s/p R hip IMN with ortho  - pain control and care as per ortho  - PT eval
2/2 Greater Trochanter Fx   F/u MRI results  Plan for surgical intervention 4/23 pending results of MRI Pelvis, per ortho  Pain management
2/2 Greater Trochanter Fx   POD 1 s/p R hip IMN with ortho  - pain control and care as per ortho  - PT eval
2/2 Greater Trochanter Fx   POD 1 s/p R hip IMN with ortho  - pain control and care as per ortho  dispo to [facility]
2/2 Greater Trochanter Fx   POD 1 s/p R hip IMN with ortho  - pain control and care as per ortho  dispo to [facility]

## 2022-04-28 NOTE — PROGRESS NOTE ADULT - PROBLEM SELECTOR PLAN 5
HSQ    dispo: to [facility] when able (delay due to covid dx)
HSQ    dispo: pending ortho clearance and PT
HSQ    dispo: pending ortho clearance and PT
HSQ    dispo: to [facility] when able (delay due to covid dx)
HSQ    dispo: to [facility] when able (delay due to covid dx)
HSQ

## 2022-04-28 NOTE — DISCHARGE NOTE PROVIDER - NSDCHOSPICE_GEN_A_CORE
"[facility] PEDIATRICS DISCHARGE SUMMARY     Name: Samuel Toro              Age: 2 days MRN: [id number]             Sex: female BW: 3609 g (7 lb 15.3 oz)              MEL: Gestational Age: 39w6d Pediatrician: Edgar Perry MD      Date of Delivery: 2021     Time of Delivery: 12:37 AM     Delivery Type: Vaginal, Spontaneous    APGARS  One minute Five minutes Ten minutes Fifteen minutes Twenty minutes   Skin color: 1   1             Heart rate: 2   2             Grimace: 2   2              Muscle tone: 2   2              Breathin   2              Totals: 9   9                 Feeding Method: breastfeeding   Maternal Labs: Reviewed, GBS +, Abx given >2hr ptd    Maternal Blood Type: A+   Infant Blood Type: not obtained     Nursery Course: Low temps recorded on DOL 0, with resolution following bundling. Temps stable for ~24 hours prior to discharge.       screen Done      Hep B Vaccine   Immunization History   Administered Date(s) Administered   • Hep B, Adolescent or Pediatric 2021         Hearing screen: Passed      CCHD   Blood Pressure:   BP: 71/51   BP Location: Right arm   BP: 67/41   BP Location: Right leg   Oxygen Saturation:            Bilirubin (serum):    7.3(0.3) @29h (L/I risk)   8.9(0.3) @51h (L/I risk)      I/O (last 24 hours): No intake or output data in the 24 hours ending 21     Birth weight: 3609 g (7 lb 15.3 oz)   Birth Length: 20\"   Birth Head circumference: 35.5 cm        D/C weight: 3320 g (7 lb 5.1 oz)   Weight change since birth: -8%     Physical Exam:    General Appearance  not in distress and asleep but easily arousable   Skin  jaundice   Head  AF open and flat or no cranial molding, caput succedaneum or cephalhematoma   Eyes  sclerae white, pupils equal and reactive   ENT  nares patent, palate intact or oropharynx normal   Lungs  clear to auscultation, no wheezes, rales, or rhonchi, no tachypnea, retractions, or cyanosis   Heart  regular " rate and rhythm, normal S1 and S2, no murmur   Abdomen (including umbilicus) Normal bowel sounds, soft, nondistended, no mass, no organomegaly.   Genitalia  normal female exam   Anus  normal   Trunk/Spine  spine normal, symmetric, no sacral dimple   Extremities Ortolani's and Rodney's signs absent bilaterally, leg length symmetrical and thigh & gluteal folds symmetrical   Reflexes Normal symmetric tone and strength, normal reflexes, symmetric Martha, normal root and suck      Date of Discharge: 2021     Follow-up:   8% weight loss, breast feeding going well per mother and lactation notes.    Encouraged frequent feedings. May consider formula supplement if continued weight loss tomorrow.    Jaundice: bili today L/I risk, MBT A+, will monitor   Plan to have follow up in our office tomorrow (5/18/21).  To call sooner with any concerns.     Edgar Perry MD    [facility]   2021   07:27 EDT       No

## 2022-04-28 NOTE — DISCHARGE NOTE PROVIDER - NSDCCPCAREPLAN_GEN_ALL_CORE_FT
PRINCIPAL DISCHARGE DIAGNOSIS  Diagnosis: Greater trochanter fracture  Assessment and Plan of Treatment: s/p IMN for R GT fx POD5.   - Pain control  - WBAT RLE  - DVT ppx: lovenox  - OOB/PT  - Dispo planning: [facility]  - No further acute orthopaedic surgical intervention indicated at this time. This patient is orthopaedically stable for discharge.   - Any sutures/staples will need to be removed POD#14  - Patient to follow up with Dr. Burdick as an outpatient in 10-14 days for further evaluation and management.

## 2022-04-28 NOTE — PROGRESS NOTE ADULT - PROVIDER SPECIALTY LIST ADULT
Hospitalist
Orthopedics
Hospitalist

## 2022-04-28 NOTE — PROGRESS NOTE ADULT - PROBLEM SELECTOR PLAN 4
Cont to monitor sCr daily  Avoid nephrotoxins
Cont to monitor sCr daily  Avoid nephrotoxins    hold arb  check fena
Cont to monitor sCr daily  Avoid nephrotoxins    hold arb  fena 3.9% - con't to monitor  no fluids for now
Cont to monitor sCr daily  Avoid nephrotoxins

## 2022-04-28 NOTE — DISCHARGE NOTE PROVIDER - HOSPITAL COURSE
89 y/o F hx HTN, previous spinal column tumor resected 2 years ago, L hip replacement, here now with R hip pain after mechanical fall, found to have R trochanter fx, s/p OR for R hip IMN, incidentally covid positive, asymptomatic.   s/p IMN for R GT fx POD5.   - Pain control  - WBAT RLE  - DVT ppx: lovenox  - OOB/PT  - Dispo planning: [facility]  - No further acute orthopaedic surgical intervention indicated at this time. This patient is orthopaedically stable for discharge.   - Any sutures/staples will need to be removed POD#14  - Patient to follow up with Dr. Burdick as an outpatient in 10-14 days for further evaluation and management.     Medically cleared for discharge to rehab with follow up as advised. 89 y/o F hx HTN, previous spinal column tumor resected 2 years ago, L hip replacement, here now with R hip pain after mechanical fall, found to have R trochanter fx, s/p OR for R hip IMN, incidentally covid positive, asymptomatic.   s/p IMN for R GT fx POD5.   - Pain control  - WBAT RLE  - DVT ppx: lovenox  - OOB/PT  - Dispo planning: [facility]  - No further acute orthopaedic surgical intervention indicated at this time. This patient is orthopaedically stable for discharge.   - Any sutures/staples will need to be removed POD#14  - Patient to follow up with Dr. Burdick as an outpatient in 10-14 days for further evaluation and management.     Medically cleared for discharge to rehab by  with follow up as advised. 89 y/o F hx HTN, previous spinal column tumor resected 2 years ago, L hip replacement, here now with R hip pain after mechanical fall, found to have R trochanter fx, s/p OR for R hip IMN, incidentally covid positive, asymptomatic.   s/p IMN for R GT fx POD5.   - Pain control  - WBAT RLE  - DVT ppx: lovenox  - OOB/PT  - Dispo planning: [facility]  - No further acute orthopaedic surgical intervention indicated at this time. This patient is orthopaedically stable for discharge.   - Any sutures/staples will need to be removed POD#14  - Patient to follow up with Dr. Burdick as an outpatient in 10-14 days for further evaluation and management.     Medically cleared for discharge to rehab by  with follow up as advised.          *********Incomplete 89 y/o F hx HTN, previous spinal column tumor resected 2 years ago, L hip replacement, here now with R hip pain after mechanical fall, found to have R trochanter fx, s/p OR for R hip IMN, incidentally covid positive, asymptomatic.   s/p IMN for R GT fx POD5.   - Pain control  - WBAT RLE  - DVT ppx: lovenox  - OOB/PT  - Dispo planning: [facility]  - No further acute orthopaedic surgical intervention indicated at this time. This patient is orthopaedically stable for discharge.   - Any sutures/staples will need to be removed POD#14  - Patient to follow up with Dr. Burdick as an outpatient in 10-14 days for further evaluation and management.     Medically cleared for discharge to rehab by  with follow up as advised.      ********Incomplete 89 y/o F hx HTN, previous spinal column tumor resected 2 years ago, L hip replacement, here now with R hip pain after mechanical fall, found to have R trochanter fx, s/p OR for R hip IMN, incidentally covid positive, asymptomatic.   s/p IMN for R GT fx POD5.   Pain control  WBAT RLE  DVT ppx: lovenox  OOB/PT  Dispo planning: [facility]  - No further acute orthopaedic surgical intervention indicated at this time. This patient is orthopaedically stable for discharge.   - Any sutures/staples will need to be removed POD#14  - Patient to follow up with Dr. Burdick as an outpatient in 10-14 days for further evaluation and management.     Medically cleared for discharge to rehab by  with follow up as advised.

## 2022-04-28 NOTE — DISCHARGE NOTE PROVIDER - NSDCFUADDAPPT_GEN_ALL_CORE_FT
- Any sutures/staples will need to be removed POD#14  - Patient to follow up with Dr. Burdick as an outpatient in 10-14 days for further evaluation and management.  - Any sutures/staples will need to be removed POD#14  - Patient to follow up with Dr. Burdick as an outpatient in 10-14 days for further evaluation and management.   APPTS ARE READY TO BE MADE: [x] YES    Best Family or Patient Contact (if needed):    Additional Information about above appointments (if needed):    1:   2:   3:     Other comments or requests:    - Any sutures/staples will need to be removed POD#14  - Patient to follow up with Dr. Burdick as an outpatient in 10-14 days for further evaluation and management.   APPTS ARE READY TO BE MADE: [x] YES    Best Family or Patient Contact (if needed):    Additional Information about above appointments (if needed):    1:   2:   3:     Patient is being discharged to rehab. Caregiver will arrange follow up.    Other comments or requests:

## 2022-04-28 NOTE — PROGRESS NOTE ADULT - PROBLEM SELECTOR PLAN 2
PCR positive; however pt is vaccinated and asymptomatic  Supportive care
PCR positive; however pt is vaccinated and asymptomatic  Supportive care  Continuos O2
PCR positive; however pt is vaccinated and asymptomatic  Supportive care  Continuos O2
PCR positive; however pt is vaccinated and asymptomatic  Supportive care

## 2022-04-28 NOTE — DISCHARGE NOTE PROVIDER - NSDCPNSUBOBJ_GEN_ALL_CORE
Patient seen and examined on 4/29/22 at 11AM  Clinically stable  Tolerated low dose ARB started yesterday and BP improved  may go to [facility] today  35 minutes spent orchestrating discharge

## 2022-04-28 NOTE — PROGRESS NOTE ADULT - PROBLEM SELECTOR PLAN 3
Will cautiously continue Losartan for now with stable Cr from 2019
not tolerating ARB due to rising cr will dc
not tolerating ARB due to rising cr will dc - cr now improving
not tolerating ARB due to rising cr will dc - cr now improving
Will cautiously continue Losartan for now with stable Cr from 2019
not tolerating ARB due to rising cr will dc - cr now improving

## 2022-04-28 NOTE — PROGRESS NOTE ADULT - REASON FOR ADMISSION
S/P fall one week ago with persistent RIGHT hip pain

## 2022-04-28 NOTE — PROGRESS NOTE ADULT - ASSESSMENT
89 y/o F hx HTN, previous spinal column tumor resected 2 years ago, L hip replacement, here now with R hip pain after mechanical fall, found to have R trochanter fx. Planned for OR today 
89 y/o F hx HTN, previous spinal column tumor resected 2 years ago, L hip replacement, here now with R hip pain after mechanical fall, found to have R trochanter fx, s/p OR for R hip IMN, incidentally covid positive, asymptomatic.
89 y/o F hx HTN, previous spinal column tumor resected 2 years ago, L hip replacement, here now with R hip pain after mechanical fall, found to have R trochanter fx, s/p OR for R hip IMN, incidentally covid positive, asymptomatic.
89 y/o F hx HTN, previous spinal column tumor resected 2 years ago, L hip replacement, here now with R hip pain after mechanical fall, found to have R trochanter fx, s/p OR for R hip IMN, incidentally covid positive, asymptomatic.
89 y/o F hx HTN, previous spinal column tumor resected 2 years ago, L hip replacement, here now with R hip pain after mechanical fall, found to have R trochanter fx, s/p OR for R hip IMN, incidentally covid positive, asymptomatic.
89 y/o F hx HTN, previous spinal column tumor resected 2 years ago, L hip replacement, here now with R hip pain after mechanical fall, found to have R trochanter fx, s/p OR for R hip IMN, incidentally covid positive, asymptomatic.

## 2022-04-29 ENCOUNTER — TRANSCRIPTION ENCOUNTER (OUTPATIENT)
Age: 87
End: 2022-04-29

## 2022-04-29 VITALS
OXYGEN SATURATION: 99 % | SYSTOLIC BLOOD PRESSURE: 128 MMHG | HEART RATE: 74 BPM | RESPIRATION RATE: 18 BRPM | DIASTOLIC BLOOD PRESSURE: 64 MMHG

## 2022-04-29 LAB
ANION GAP SERPL CALC-SCNC: 11 MMOL/L — SIGNIFICANT CHANGE UP (ref 5–17)
BUN SERPL-MCNC: 26 MG/DL — HIGH (ref 7–23)
CALCIUM SERPL-MCNC: 8.9 MG/DL — SIGNIFICANT CHANGE UP (ref 8.4–10.5)
CHLORIDE SERPL-SCNC: 102 MMOL/L — SIGNIFICANT CHANGE UP (ref 96–108)
CO2 SERPL-SCNC: 22 MMOL/L — SIGNIFICANT CHANGE UP (ref 22–31)
CREAT SERPL-MCNC: 1.1 MG/DL — SIGNIFICANT CHANGE UP (ref 0.5–1.3)
EGFR: 47 ML/MIN/1.73M2 — LOW
GLUCOSE SERPL-MCNC: 101 MG/DL — HIGH (ref 70–99)
POTASSIUM SERPL-MCNC: 4.9 MMOL/L — SIGNIFICANT CHANGE UP (ref 3.5–5.3)
POTASSIUM SERPL-SCNC: 4.9 MMOL/L — SIGNIFICANT CHANGE UP (ref 3.5–5.3)
SODIUM SERPL-SCNC: 135 MMOL/L — SIGNIFICANT CHANGE UP (ref 135–145)

## 2022-04-29 PROCEDURE — C1769: CPT

## 2022-04-29 PROCEDURE — 72040 X-RAY EXAM NECK SPINE 2-3 VW: CPT

## 2022-04-29 PROCEDURE — 73502 X-RAY EXAM HIP UNI 2-3 VIEWS: CPT

## 2022-04-29 PROCEDURE — 72195 MRI PELVIS W/O DYE: CPT | Mod: MG

## 2022-04-29 PROCEDURE — 82570 ASSAY OF URINE CREATININE: CPT

## 2022-04-29 PROCEDURE — 84300 ASSAY OF URINE SODIUM: CPT

## 2022-04-29 PROCEDURE — U0005: CPT

## 2022-04-29 PROCEDURE — 99239 HOSP IP/OBS DSCHRG MGMT >30: CPT

## 2022-04-29 PROCEDURE — 85610 PROTHROMBIN TIME: CPT

## 2022-04-29 PROCEDURE — 85730 THROMBOPLASTIN TIME PARTIAL: CPT

## 2022-04-29 PROCEDURE — C1713: CPT

## 2022-04-29 PROCEDURE — 86900 BLOOD TYPING SEROLOGIC ABO: CPT

## 2022-04-29 PROCEDURE — 71045 X-RAY EXAM CHEST 1 VIEW: CPT

## 2022-04-29 PROCEDURE — 97110 THERAPEUTIC EXERCISES: CPT

## 2022-04-29 PROCEDURE — 85025 COMPLETE CBC W/AUTO DIFF WBC: CPT

## 2022-04-29 PROCEDURE — 86901 BLOOD TYPING SEROLOGIC RH(D): CPT

## 2022-04-29 PROCEDURE — 97165 OT EVAL LOW COMPLEX 30 MIN: CPT

## 2022-04-29 PROCEDURE — 99285 EMERGENCY DEPT VISIT HI MDM: CPT | Mod: 25

## 2022-04-29 PROCEDURE — 73552 X-RAY EXAM OF FEMUR 2/>: CPT

## 2022-04-29 PROCEDURE — 72192 CT PELVIS W/O DYE: CPT | Mod: MA

## 2022-04-29 PROCEDURE — 86850 RBC ANTIBODY SCREEN: CPT

## 2022-04-29 PROCEDURE — 76000 FLUOROSCOPY <1 HR PHYS/QHP: CPT

## 2022-04-29 PROCEDURE — 97161 PT EVAL LOW COMPLEX 20 MIN: CPT

## 2022-04-29 PROCEDURE — 80053 COMPREHEN METABOLIC PANEL: CPT

## 2022-04-29 PROCEDURE — 80048 BASIC METABOLIC PNL TOTAL CA: CPT

## 2022-04-29 PROCEDURE — G1004: CPT

## 2022-04-29 PROCEDURE — 36415 COLL VENOUS BLD VENIPUNCTURE: CPT

## 2022-04-29 PROCEDURE — 76377 3D RENDER W/INTRP POSTPROCES: CPT

## 2022-04-29 PROCEDURE — 85027 COMPLETE CBC AUTOMATED: CPT

## 2022-04-29 PROCEDURE — 97116 GAIT TRAINING THERAPY: CPT

## 2022-04-29 PROCEDURE — U0003: CPT

## 2022-04-29 PROCEDURE — 97530 THERAPEUTIC ACTIVITIES: CPT

## 2022-04-29 RX ADMIN — LOSARTAN POTASSIUM 25 MILLIGRAM(S): 100 TABLET, FILM COATED ORAL at 05:48

## 2022-04-29 RX ADMIN — CHLORHEXIDINE GLUCONATE 1 APPLICATION(S): 213 SOLUTION TOPICAL at 12:49

## 2022-04-29 RX ADMIN — Medication 975 MILLIGRAM(S): at 13:26

## 2022-04-29 RX ADMIN — POLYETHYLENE GLYCOL 3350 17 GRAM(S): 17 POWDER, FOR SOLUTION ORAL at 12:50

## 2022-04-29 RX ADMIN — Medication 975 MILLIGRAM(S): at 05:48

## 2022-04-29 RX ADMIN — PREGABALIN 1000 MICROGRAM(S): 225 CAPSULE ORAL at 12:49

## 2022-04-29 RX ADMIN — Medication 975 MILLIGRAM(S): at 06:55

## 2022-04-29 RX ADMIN — Medication 1000 UNIT(S): at 12:50

## 2022-04-29 NOTE — DISCHARGE NOTE NURSING/CASE MANAGEMENT/SOCIAL WORK - PATIENT PORTAL LINK FT
You can access the FollowMyHealth Patient Portal offered by [facility] by registering at the following website: [email or web address]. By joining [facility]’s FollowMyHealth portal, you will also be able to view your health information using other applications (apps) compatible with our system.

## 2022-04-29 NOTE — DISCHARGE NOTE NURSING/CASE MANAGEMENT/SOCIAL WORK - NSDCPEFALRISK_GEN_ALL_CORE
For information on Fall & Injury Prevention, visit: [email or web address] OR  [email or web address] OR  https://www.cdc.gov/steadi/patient.html

## 2022-04-29 NOTE — DISCHARGE NOTE NURSING/CASE MANAGEMENT/SOCIAL WORK - NSDCFUADDAPPT_GEN_ALL_CORE_FT
- Any sutures/staples will need to be removed POD#14  - Patient to follow up with Dr. Burdick as an outpatient in 10-14 days for further evaluation and management.   APPTS ARE READY TO BE MADE: [x] YES    Best Family or Patient Contact (if needed):    Additional Information about above appointments (if needed):    1:   2:   3:     Other comments or requests:

## 2022-05-05 PROBLEM — M25.551 PAIN IN RIGHT HIP: Chronic | Status: ACTIVE | Noted: 2022-04-22

## 2022-05-12 ENCOUNTER — APPOINTMENT (OUTPATIENT)
Dept: ORTHOPEDIC SURGERY | Facility: CLINIC | Age: 87
End: 2022-05-12
Payer: MEDICARE

## 2022-05-12 VITALS — HEIGHT: 57 IN | WEIGHT: 105 LBS | BODY MASS INDEX: 22.65 KG/M2

## 2022-05-12 DIAGNOSIS — S72.141D DISPLACED INTERTROCHANTERIC FRACTURE OF RIGHT FEMUR, SUBSEQUENT ENCOUNTER FOR CLOSED FRACTURE WITH ROUTINE HEALING: ICD-10-CM

## 2022-05-12 PROCEDURE — 99024 POSTOP FOLLOW-UP VISIT: CPT

## 2022-05-12 PROCEDURE — 73501 X-RAY EXAM HIP UNI 1 VIEW: CPT

## 2022-05-12 PROCEDURE — 73502 X-RAY EXAM HIP UNI 2-3 VIEWS: CPT | Mod: 26,RT

## 2022-05-12 PROCEDURE — 72170 X-RAY EXAM OF PELVIS: CPT | Mod: RT

## 2022-05-12 NOTE — ADDENDUM
[FreeTextEntry1] : I, Mikayla Collado wrote this note acting as a scribe for Dr. Coy Burdick on May 12, 2022.

## 2022-05-12 NOTE — END OF VISIT
[FreeTextEntry3] : All medical record entries made by the Scribe were at my,  Dr. Coy Burdick MD., direction and personally dictated by me on 05/12/2022. I have personally reviewed the chart and agree that the record accurately reflects my personal performance of the history, physical exam, assessment and plan.

## 2022-05-12 NOTE — HISTORY OF PRESENT ILLNESS
[de-identified] : s/p ORIF of right hip [de-identified] : The patient is a 89 year female who returns for the 1st postoperative visit after undergoing ORIF of right hip at [facility]. The surgery was on 04/23/2022. The patient is recovering at [facility] rehab facility. She reports to be doing well and denies pain. She does reports mild discomfort. [de-identified] : Patient is WDWN, alert, and in no acute distress. Breathing is unlabored. She is grossly oriented to person, place, and time.\par \par Dissolvable sutures in place. The incision site is well healed, without signs of postoperative infection.\par Normal amount of postoperative edema and tenderness present.\par  [de-identified] : AP and lateral views of the RIGHT hip and pelvis were obtained and revealed an intertrochanteric fracture stabilized by Size 11 x 170 Synthes TFNA trochanteric femoral nail advanced  with a size 90 spiral blade and a size 32 distal locking bolt. [de-identified] : Xrays reviewed with the patient.\par She was encouraged on exercise such as walking but with use of a walker.\par Paperwork was filled out for the [facility].\par She will follow up as needed.

## 2022-05-19 ENCOUNTER — FORM ENCOUNTER (OUTPATIENT)
Age: 87
End: 2022-05-19

## 2022-09-07 NOTE — PRE-OP CHECKLIST - NS PREOP CHK CHLOROHEX WASH
in ASU: Additional Notes: Patient consent was obtained to proceed with the visit and recommended plan of care after discussion of all risks and benefits, including the risks of COVID-19 exposure. Detail Level: Simple

## 2022-09-07 NOTE — PHYSICAL THERAPY INITIAL EVALUATION ADULT - SITTING BALANCE: DYNAMIC
We are going to increase your furosemide and potassium to twice daily, you will take them both at 7am and 1pm for the next 2 weeks. Please check your weight on a daily basis and keep a log.    For the next 2 weeks I do not want you to take your lisinopril.   
good balance

## 2022-09-21 ENCOUNTER — APPOINTMENT (OUTPATIENT)
Dept: PULMONOLOGY | Facility: CLINIC | Age: 87
End: 2022-09-21

## 2022-09-21 VITALS
HEART RATE: 82 BPM | RESPIRATION RATE: 16 BRPM | OXYGEN SATURATION: 96 % | SYSTOLIC BLOOD PRESSURE: 138 MMHG | DIASTOLIC BLOOD PRESSURE: 80 MMHG

## 2022-09-21 PROCEDURE — G0008: CPT

## 2022-09-21 PROCEDURE — 90662 IIV NO PRSV INCREASED AG IM: CPT

## 2022-09-21 PROCEDURE — 99214 OFFICE O/P EST MOD 30 MIN: CPT | Mod: 25

## 2022-09-21 NOTE — HISTORY OF PRESENT ILLNESS
[Never] : never [TextBox_4] : She had COVID in April 2022. She had fallen before. Her son took her to urgent care, [facility]. Had pain after that and went to [facility]. \par \par She was in the hospital. Was found to have COVID on PCR. Had surgery, ORIF on 4/23/22, on her right hip. After that went to the [facility] for rehab. \par \par \par \par \par \par  [Difficulty Initiating Sleep] : does not have difficulty initiating sleep [Difficulty Maintaining Sleep] : does not have difficulty maintaining sleep

## 2022-09-21 NOTE — DISCUSSION/SUMMARY
[FreeTextEntry1] : She is a 89 year-old woman with a history of hypertension. Had neuropathy in her feet. MRI done in June of 2019 showed a mass at T8. Surgical intervention was performed in July 2019. She was found to have a meningioma. She did well and resumed her normal activity except she still has a neuropathy in her feet. Fell at home in April 2022. Waited several days before having a medical evaluation. Went to [facility]. S/P ORIF 4/23/22. \par \par She continues to live alone which is her preference.  Her family is very much involved in keeping an eye on her.  \par \par Her blood pressure is well controlled. Renew losartan 50 mg per day.  She was advised to follow her blood pressure at home and keep a record of it.  Influenza vaccination given.\par \par Follow up in 6 months.

## 2022-09-21 NOTE — PHYSICAL EXAM
[General Appearance - In No Acute Distress] : no acute distress [Neck Cervical Mass (___cm)] : no neck mass was observed [Thyroid Diffuse Enlargement] : the thyroid was not enlarged [Heart Sounds] : normal S1 and S2 [Murmurs] : no murmurs present [Auscultation Breath Sounds / Voice Sounds] : lungs were clear to auscultation bilaterally [Abnormal Walk] : normal gait [Cyanosis, Localized] : no localized cyanosis [Skin Turgor] : normal skin turgor [No Focal Deficits] : no focal deficits [Oriented To Time, Place, And Person] : oriented to person, place, and time [Neck Appearance] : the appearance of the neck was normal [] : no respiratory distress [FreeTextEntry1] : Crepitations noted in the right shoulder with movement. Skin intact. No swelling. No redness.

## 2022-09-21 NOTE — REVIEW OF SYSTEMS
[Hypertension] : ~T hypertension [Numbness] : numbness [Memory Loss] : ~T memory lapses or loss [Fever] : no fever [Chills] : no chills [Fatigue] : no fatigue [Nasal Congestion] : no nasal congestion [Cough] : no cough [Dyspnea] : no dyspnea [Chest Tightness] : no chest tightness [Wheezing] : no wheezing [Palpitations] : no palpitations [Edema] : ~T edema was not present [Nasal Discharge] : no nasal discharge [Heartburn] : no heartburn [Indigestion] : no indigestion [Dysuria] : no dysuria [Back Pain] : ~T no back pain [Myalgias] : no myalgias [Arthralgias] : no arthralgias [Rash] : no [unfilled] rash [Anemia] : no anemia [Easy Bruising] : no ~M tendency for easy bruising [Headache] : no headache [Dizziness] : no dizziness [Syncope] : no fainting [Focal Weakness] : no focal weakness [Paralysis] : no paralysis was seen [Seizures] : no seizures [Confusion] : no confusion [Head Injury] : no head injury [Involuntary Movements] : ~T no involuntary movements [Tremor] : ~T no ~M tremor was seen [Anxiety] : no anxiety [Diabetes] : no diabetes mellitus [Thyroid Problem] : no thyroid problem [DVT] : no DVT [Pulmonary Embolism] : no pulmonary embolism

## 2023-01-06 NOTE — PROGRESS NOTE ADULT - PROBLEM SELECTOR PROBLEM 2
Essential hypertension 2019 novel coronavirus disease (COVID-19) Same Histology In Subsequent Stages Text: The pattern and morphology of the tumor is as described in the first stage.

## 2023-03-22 ENCOUNTER — APPOINTMENT (OUTPATIENT)
Dept: PULMONOLOGY | Facility: CLINIC | Age: 88
End: 2023-03-22
Payer: MEDICARE

## 2023-03-22 VITALS
SYSTOLIC BLOOD PRESSURE: 136 MMHG | DIASTOLIC BLOOD PRESSURE: 84 MMHG | HEART RATE: 68 BPM | TEMPERATURE: 96 F | WEIGHT: 107 LBS | OXYGEN SATURATION: 99 % | BODY MASS INDEX: 23.15 KG/M2

## 2023-03-22 PROCEDURE — 36415 COLL VENOUS BLD VENIPUNCTURE: CPT

## 2023-03-22 PROCEDURE — 99214 OFFICE O/P EST MOD 30 MIN: CPT | Mod: 25

## 2023-03-22 NOTE — DISCUSSION/SUMMARY
[FreeTextEntry1] : She is a 89 year-old woman with a history of hypertension. H/O neuropathy in her feet. MRI done in June of 2019 showed a mass at T8. Surgical intervention was performed in July 2019. She was found to have a meningioma. She did well and resumed her normal activity except she still has a neuropathy in her feet. Fell at home in April 2022. Waited several days before having a medical evaluation. Went to [facility]. S/P ORIF 4/23/22. \par \par She continues to live alone which is her preference.  Her family is very much involved in keeping an eye on her.  \par \par Impression: \par Hypertension controlled\par Neuropathy\par - follow up with Neurology prn\par \par Plan: \par Check blood work\par Follow up in 6 months\par

## 2023-03-22 NOTE — REVIEW OF SYSTEMS
[Hypertension] : ~T hypertension [Numbness] : numbness [Memory Loss] : ~T memory lapses or loss [Fatigue] : no fatigue [Nasal Congestion] : no nasal congestion [Dyspnea] : no dyspnea [Chest Tightness] : no chest tightness [Wheezing] : no wheezing [Palpitations] : no palpitations [Edema] : ~T edema was not present [Nasal Discharge] : no nasal discharge [Heartburn] : no heartburn [Indigestion] : no indigestion [Dysuria] : no dysuria [Back Pain] : ~T no back pain [Myalgias] : no myalgias [Arthralgias] : no arthralgias [Rash] : no [unfilled] rash [Anemia] : no anemia [Easy Bruising] : no ~M tendency for easy bruising [Headache] : no headache [Dizziness] : no dizziness [Syncope] : no fainting [Focal Weakness] : no focal weakness [Paralysis] : no paralysis was seen [Seizures] : no seizures [Confusion] : no confusion [Head Injury] : no head injury [Involuntary Movements] : ~T no involuntary movements [Tremor] : ~T no ~M tremor was seen [Anxiety] : no anxiety [Diabetes] : no diabetes mellitus [Thyroid Problem] : no thyroid problem [DVT] : no DVT [Pulmonary Embolism] : no pulmonary embolism

## 2023-03-22 NOTE — PHYSICAL EXAM
[General Appearance - In No Acute Distress] : no acute distress [Neck Appearance] : the appearance of the neck was normal [Neck Cervical Mass (___cm)] : no neck mass was observed [Thyroid Diffuse Enlargement] : the thyroid was not enlarged [Heart Sounds] : normal S1 and S2 [Murmurs] : no murmurs present [Auscultation Breath Sounds / Voice Sounds] : lungs were clear to auscultation bilaterally [] : no hepato-splenomegaly [Abnormal Walk] : normal gait [Cyanosis, Localized] : no localized cyanosis [Skin Turgor] : normal skin turgor [No Focal Deficits] : no focal deficits [Oriented To Time, Place, And Person] : oriented to person, place, and time [Normal Appearance] : normal appearance [Respiration, Rhythm And Depth] : normal respiratory rhythm and effort [Bowel Sounds] : normal bowel sounds [Impaired Insight] : insight and judgment were intact [FreeTextEntry1] : Crepitations noted in the right shoulder with movement. Skin intact. No swelling. No redness.

## 2023-03-22 NOTE — HISTORY OF PRESENT ILLNESS
[Never] : never [TextBox_4] : She is feeling well. Lives alone Doing well. Has not had any falls. Came with her daughter in law. \par \par Still has tingling in both feet. \par  [Awakes Unrefreshed] : does not awaken unrefreshed [Difficulty Initiating Sleep] : does not have difficulty initiating sleep [Difficulty Maintaining Sleep] : does not have difficulty maintaining sleep

## 2023-03-23 ENCOUNTER — NON-APPOINTMENT (OUTPATIENT)
Age: 88
End: 2023-03-23

## 2023-03-23 LAB
ALBUMIN SERPL ELPH-MCNC: 4.4 G/DL
ALP BLD-CCNC: 78 U/L
ALT SERPL-CCNC: 10 U/L
ANION GAP SERPL CALC-SCNC: 13 MMOL/L
AST SERPL-CCNC: 15 U/L
BASOPHILS # BLD AUTO: 0.04 K/UL
BASOPHILS NFR BLD AUTO: 0.4 %
BILIRUB SERPL-MCNC: 0.3 MG/DL
BUN SERPL-MCNC: 44 MG/DL
CALCIUM SERPL-MCNC: 9.9 MG/DL
CHLORIDE SERPL-SCNC: 105 MMOL/L
CHOLEST SERPL-MCNC: 194 MG/DL
CO2 SERPL-SCNC: 22 MMOL/L
CREAT SERPL-MCNC: 1.5 MG/DL
EGFR: 32 ML/MIN/1.73M2
EOSINOPHIL # BLD AUTO: 0.04 K/UL
EOSINOPHIL NFR BLD AUTO: 0.4 %
GLUCOSE SERPL-MCNC: 98 MG/DL
HCT VFR BLD CALC: 42.3 %
HDLC SERPL-MCNC: 54 MG/DL
HGB BLD-MCNC: 12.8 G/DL
IMM GRANULOCYTES NFR BLD AUTO: 0.3 %
LDLC SERPL CALC-MCNC: 119 MG/DL
LYMPHOCYTES # BLD AUTO: 2.02 K/UL
LYMPHOCYTES NFR BLD AUTO: 19.3 %
MAN DIFF?: NORMAL
MCHC RBC-ENTMCNC: 29.6 PG
MCHC RBC-ENTMCNC: 30.3 GM/DL
MCV RBC AUTO: 97.9 FL
MONOCYTES # BLD AUTO: 0.44 K/UL
MONOCYTES NFR BLD AUTO: 4.2 %
NEUTROPHILS # BLD AUTO: 7.91 K/UL
NEUTROPHILS NFR BLD AUTO: 75.4 %
NONHDLC SERPL-MCNC: 139 MG/DL
PLATELET # BLD AUTO: 289 K/UL
POTASSIUM SERPL-SCNC: 5.4 MMOL/L
PROT SERPL-MCNC: 7.1 G/DL
RBC # BLD: 4.32 M/UL
RBC # FLD: 14.2 %
SODIUM SERPL-SCNC: 140 MMOL/L
TRIGL SERPL-MCNC: 105 MG/DL
WBC # FLD AUTO: 10.48 K/UL

## 2023-06-01 ENCOUNTER — APPOINTMENT (OUTPATIENT)
Dept: PULMONOLOGY | Facility: CLINIC | Age: 88
End: 2023-06-01
Payer: MEDICARE

## 2023-06-01 VITALS
WEIGHT: 107 LBS | TEMPERATURE: 96.2 F | HEART RATE: 85 BPM | DIASTOLIC BLOOD PRESSURE: 88 MMHG | BODY MASS INDEX: 23.15 KG/M2 | OXYGEN SATURATION: 97 % | SYSTOLIC BLOOD PRESSURE: 172 MMHG

## 2023-06-01 VITALS — DIASTOLIC BLOOD PRESSURE: 94 MMHG | SYSTOLIC BLOOD PRESSURE: 178 MMHG

## 2023-06-01 DIAGNOSIS — M25.472 EFFUSION, RIGHT ANKLE: ICD-10-CM

## 2023-06-01 DIAGNOSIS — N18.9 CHRONIC KIDNEY DISEASE, UNSPECIFIED: ICD-10-CM

## 2023-06-01 DIAGNOSIS — M25.471 EFFUSION, RIGHT ANKLE: ICD-10-CM

## 2023-06-01 PROCEDURE — 99214 OFFICE O/P EST MOD 30 MIN: CPT | Mod: 25

## 2023-06-01 PROCEDURE — 36415 COLL VENOUS BLD VENIPUNCTURE: CPT

## 2023-06-01 NOTE — REVIEW OF SYSTEMS
[Hypertension] : ~T hypertension [Numbness] : numbness [Memory Loss] : ~T memory lapses or loss [Fever] : no fever [Fatigue] : no fatigue [Nasal Congestion] : no nasal congestion [Dyspnea] : no dyspnea [Chest Tightness] : no chest tightness [Wheezing] : no wheezing [Hypotension] : no hypotension [Palpitations] : no palpitations [Edema] : ~T edema was not present [Nasal Discharge] : no nasal discharge [Heartburn] : no heartburn [Indigestion] : no indigestion [Dysuria] : no dysuria [Back Pain] : ~T no back pain [Myalgias] : no myalgias [Arthralgias] : no arthralgias [Rash] : no [unfilled] rash [Anemia] : no anemia [Easy Bruising] : no ~M tendency for easy bruising [Headache] : no headache [Dizziness] : no dizziness [Syncope] : no fainting [Focal Weakness] : no focal weakness [Paralysis] : no paralysis was seen [Seizures] : no seizures [Confusion] : no confusion [Head Injury] : no head injury [Involuntary Movements] : ~T no involuntary movements [Tremor] : ~T no ~M tremor was seen [Anxiety] : no anxiety [Diabetes] : no diabetes mellitus [Thyroid Problem] : no thyroid problem [DVT] : no DVT [Pulmonary Embolism] : no pulmonary embolism

## 2023-06-01 NOTE — PHYSICAL EXAM
[Normal Appearance] : normal appearance [General Appearance - In No Acute Distress] : no acute distress [Neck Appearance] : the appearance of the neck was normal [Neck Cervical Mass (___cm)] : no neck mass was observed [Thyroid Diffuse Enlargement] : the thyroid was not enlarged [Heart Sounds] : normal S1 and S2 [Murmurs] : no murmurs present [Auscultation Breath Sounds / Voice Sounds] : lungs were clear to auscultation bilaterally [] : no hepato-splenomegaly [Abnormal Walk] : normal gait [Cyanosis, Localized] : no localized cyanosis [Skin Turgor] : normal skin turgor [No Focal Deficits] : no focal deficits [Oriented To Time, Place, And Person] : oriented to person, place, and time [Impaired Insight] : insight and judgment were intact [Heart Rate And Rhythm] : heart rate and rhythm were normal [1+ Pitting] : 1+  pitting [FreeTextEntry1] : Crepitations noted in the right shoulder with movement. Skin intact. No swelling. No redness.

## 2023-06-01 NOTE — HISTORY OF PRESENT ILLNESS
[Never] : never [TextBox_4] : She complains of swelling at the ankles.  She was started on amlodipine.  She has not taken it since May 19.  She is feeling well otherwise.  She monitors her blood pressure at home and in general it runs about 160/90.  [Awakes Unrefreshed] : does not awaken unrefreshed [Difficulty Initiating Sleep] : does not have difficulty initiating sleep [Difficulty Maintaining Sleep] : does not have difficulty maintaining sleep

## 2023-06-01 NOTE — DISCUSSION/SUMMARY
[FreeTextEntry1] : She is a 89 year-old woman with a history of hypertension. H/O neuropathy in her feet. MRI done in June of 2019 showed a mass at T8. Surgical intervention was performed in July 2019. She was found to have a meningioma. She did well and resumed her normal activity except she still has a neuropathy in her feet. Fell at home in April 2022. Waited several days before having a medical evaluation. Went to [facility]. S/P ORIF 4/23/22. \par \par Impression: \par Hypertension\par Mild dependent edema most likely due to amlodipine\par CKD ? \par \par Plan: \par Check blood work today\par Hold amlodipine for now\par - Will replace when BMP is completed\par Monitor BP at home and keep a record\par Watch salt intake\par Follow up in 6 months\par

## 2023-06-02 ENCOUNTER — NON-APPOINTMENT (OUTPATIENT)
Age: 88
End: 2023-06-02

## 2023-06-02 LAB
ALBUMIN SERPL ELPH-MCNC: 4.1 G/DL
ALP BLD-CCNC: 72 U/L
ALT SERPL-CCNC: 12 U/L
ANION GAP SERPL CALC-SCNC: 12 MMOL/L
AST SERPL-CCNC: 19 U/L
BILIRUB SERPL-MCNC: 0.3 MG/DL
BUN SERPL-MCNC: 18 MG/DL
CALCIUM SERPL-MCNC: 9.5 MG/DL
CHLORIDE SERPL-SCNC: 108 MMOL/L
CO2 SERPL-SCNC: 23 MMOL/L
CREAT SERPL-MCNC: 1.09 MG/DL
EGFR: 47 ML/MIN/1.73M2
GLUCOSE SERPL-MCNC: 105 MG/DL
POTASSIUM SERPL-SCNC: 4.3 MMOL/L
PROT SERPL-MCNC: 6.8 G/DL
SODIUM SERPL-SCNC: 143 MMOL/L

## 2023-06-02 RX ORDER — CHLORTHALIDONE 25 MG/1
25 TABLET ORAL DAILY
Qty: 45 | Refills: 1 | Status: ACTIVE | COMMUNITY
Start: 2023-06-02 | End: 1900-01-01

## 2023-06-06 NOTE — OCCUPATIONAL THERAPY INITIAL EVALUATION ADULT - LEVEL OF INDEPENDENCE: BED TO CHAIR, REHAB EVAL
bag.  Cover up or remove any of your personal information on the empty containers by covering it with black permanent marker or duct tape. Place the sealed container with the mixture, and the empty drug containers, in the trash. If you use a medication that is in the form of a patch, dispose of used patches by folding them in half so that the sticky sides meet, and then flushing them down a toilet. They should not be placed in the household trash where children or pets can find them. If you have any questions, ask your provider or pharmacist for more information. Be sure to keep all appointments for provider visits or tests. We are committed to providing you with the best care possible. In order to help us achieve these goals please remember to bring all medications, herbal products, and over the counter supplements with you to each visit. If your provider has ordered testing for you, please be sure to follow up with our office if you have not received results within 7 days after the testing took place. *If you receive a survey after visiting one of our offices, please take time to share your experience concerning your physician office visit. These surveys are confidential and no health information about you is shared. We are eager to improve for you and we are counting on your feedback to help make that happen. contact guard

## 2023-07-26 NOTE — PRE-ANESTHESIA EVALUATION ADULT - NSANTHADDINFOFT_GEN_ALL_CORE
HLD (hyperlipidemia) risks and benefits of GETA discussed including n/v, sore throat, cardiopulmoanry complications. HTN (hypertension)

## 2023-09-20 ENCOUNTER — APPOINTMENT (OUTPATIENT)
Dept: PULMONOLOGY | Facility: CLINIC | Age: 88
End: 2023-09-20

## 2023-10-04 ENCOUNTER — APPOINTMENT (OUTPATIENT)
Dept: PULMONOLOGY | Facility: CLINIC | Age: 88
End: 2023-10-04

## 2023-10-30 ENCOUNTER — APPOINTMENT (OUTPATIENT)
Dept: PULMONOLOGY | Facility: CLINIC | Age: 88
End: 2023-10-30
Payer: MEDICARE

## 2023-10-30 VITALS — SYSTOLIC BLOOD PRESSURE: 142 MMHG | DIASTOLIC BLOOD PRESSURE: 82 MMHG

## 2023-10-30 VITALS
TEMPERATURE: 96.5 F | DIASTOLIC BLOOD PRESSURE: 80 MMHG | BODY MASS INDEX: 21.64 KG/M2 | SYSTOLIC BLOOD PRESSURE: 160 MMHG | WEIGHT: 100 LBS | OXYGEN SATURATION: 97 % | HEART RATE: 69 BPM

## 2023-10-30 DIAGNOSIS — I10 ESSENTIAL (PRIMARY) HYPERTENSION: ICD-10-CM

## 2023-10-30 DIAGNOSIS — Z00.00 ENCOUNTER FOR GENERAL ADULT MEDICAL EXAMINATION W/OUT ABNORMAL FINDINGS: ICD-10-CM

## 2023-10-30 PROCEDURE — 36415 COLL VENOUS BLD VENIPUNCTURE: CPT

## 2023-10-30 PROCEDURE — 99214 OFFICE O/P EST MOD 30 MIN: CPT | Mod: 25

## 2023-10-30 RX ORDER — AMLODIPINE BESYLATE 2.5 MG/1
2.5 TABLET ORAL
Qty: 90 | Refills: 1 | Status: DISCONTINUED | COMMUNITY
Start: 2023-03-23 | End: 2023-10-30

## 2023-10-31 ENCOUNTER — NON-APPOINTMENT (OUTPATIENT)
Age: 88
End: 2023-10-31

## 2023-10-31 LAB
ALBUMIN SERPL ELPH-MCNC: 4.2 G/DL
ALP BLD-CCNC: 79 U/L
ALT SERPL-CCNC: 10 U/L
ANION GAP SERPL CALC-SCNC: 12 MMOL/L
AST SERPL-CCNC: 15 U/L
BILIRUB SERPL-MCNC: 0.3 MG/DL
BUN SERPL-MCNC: 35 MG/DL
CALCIUM SERPL-MCNC: 9.5 MG/DL
CHLORIDE SERPL-SCNC: 103 MMOL/L
CO2 SERPL-SCNC: 25 MMOL/L
CREAT SERPL-MCNC: 1.42 MG/DL
EGFR: 34 ML/MIN/1.73M2
ESTIMATED AVERAGE GLUCOSE: 120 MG/DL
GLUCOSE SERPL-MCNC: 96 MG/DL
HBA1C MFR BLD HPLC: 5.8 %
HCT VFR BLD CALC: 41.1 %
HGB BLD-MCNC: 13 G/DL
MCHC RBC-ENTMCNC: 29.8 PG
MCHC RBC-ENTMCNC: 31.6 GM/DL
MCV RBC AUTO: 94.3 FL
PLATELET # BLD AUTO: 325 K/UL
POTASSIUM SERPL-SCNC: 4.7 MMOL/L
PROT SERPL-MCNC: 7.1 G/DL
RBC # BLD: 4.36 M/UL
RBC # FLD: 14.5 %
SODIUM SERPL-SCNC: 140 MMOL/L
WBC # FLD AUTO: 9.78 K/UL

## 2023-11-29 NOTE — ED PROVIDER NOTE - ATTENDING CONTRIBUTION TO CARE
Otc Regimen: Recommend SPF 50 or greater for face, 30 or greater for body\\nRecommend mineral sunscreen in all cases (active ingredients zinc oxide and/or titanium dioxide)\\nBrands: Neutrogena, Tizo, La Roche Posay, Elta MD
Detail Level: Simple
Attending MD Crespo.  Agree with above.  Pt is a 89 yo fem with pmhx of HTN and spinal column tumor at T7 resected 2 yrs ago.  Baseline pt is ambulatory, functional.  Last friday pt opened a cabinet and lost her balance and landed on R hip and had neg XR at  at that time.  PT has bilateral neuropathy at baseline without change in sensation deficits.  Pt with mildly reduce motion 2/2 pain.  Planned XR's hip/pelvis.  If neg planned CT hip to eval for occult fx missed on XR.

## 2023-12-05 ENCOUNTER — MED ADMIN CHARGE (OUTPATIENT)
Age: 88
End: 2023-12-05

## 2024-09-17 ENCOUNTER — NON-APPOINTMENT (OUTPATIENT)
Age: 89
End: 2024-09-17

## 2024-09-18 ENCOUNTER — APPOINTMENT (OUTPATIENT)
Dept: PULMONOLOGY | Facility: CLINIC | Age: 89
End: 2024-09-18
Payer: MEDICARE

## 2024-09-18 VITALS — DIASTOLIC BLOOD PRESSURE: 92 MMHG | SYSTOLIC BLOOD PRESSURE: 174 MMHG

## 2024-09-18 VITALS
OXYGEN SATURATION: 99 % | WEIGHT: 102 LBS | BODY MASS INDEX: 22.07 KG/M2 | TEMPERATURE: 96.4 F | HEART RATE: 62 BPM | SYSTOLIC BLOOD PRESSURE: 190 MMHG | DIASTOLIC BLOOD PRESSURE: 100 MMHG

## 2024-09-18 DIAGNOSIS — Z87.01 PERSONAL HISTORY OF PNEUMONIA (RECURRENT): ICD-10-CM

## 2024-09-18 DIAGNOSIS — I10 ESSENTIAL (PRIMARY) HYPERTENSION: ICD-10-CM

## 2024-09-18 PROCEDURE — G0008: CPT

## 2024-09-18 PROCEDURE — 99214 OFFICE O/P EST MOD 30 MIN: CPT | Mod: 25

## 2024-09-18 PROCEDURE — 90662 IIV NO PRSV INCREASED AG IM: CPT

## 2024-09-18 PROCEDURE — 36415 COLL VENOUS BLD VENIPUNCTURE: CPT

## 2024-09-18 NOTE — PROCEDURE
[FreeTextEntry1] : MRI 6/28/19: Mass lesion at T8 level with compression on the spinal cord. The findings were felt to be consistent with a meningioma or a schwannoma.  EKG 7/24/19: Regular sinus rhythm with poor R-wave progression.

## 2024-09-18 NOTE — HISTORY OF PRESENT ILLNESS
[Never] : never [TextBox_4] : She is feeling well.  She had pneumonia in July.  She was seen at [facility] and was tested negative for COVID.  She was given antibiotics.  At the present time no cough, no shortness of breath.  She is not taking any medications for blood pressure. [Awakes Unrefreshed] : does not awaken unrefreshed [Difficulty Initiating Sleep] : does not have difficulty initiating sleep [Difficulty Maintaining Sleep] : does not have difficulty maintaining sleep

## 2024-09-18 NOTE — PHYSICAL EXAM
[Normal Appearance] : normal appearance [General Appearance - In No Acute Distress] : no acute distress [Neck Appearance] : the appearance of the neck was normal [Heart Rate And Rhythm] : heart rate and rhythm were normal [Heart Sounds] : normal S1 and S2 [Murmurs] : no murmurs present [Auscultation Breath Sounds / Voice Sounds] : lungs were clear to auscultation bilaterally [Abnormal Walk] : normal gait [Cyanosis, Localized] : no localized cyanosis [1+ Pitting] : 1+  pitting [Skin Turgor] : normal skin turgor [No Focal Deficits] : no focal deficits [Oriented To Time, Place, And Person] : oriented to person, place, and time [Impaired Insight] : insight and judgment were intact [Well Groomed] : well groomed [] : no respiratory distress [Exaggerated Use Of Accessory Muscles For Inspiration] : no accessory muscle use [Bowel Sounds] : normal bowel sounds [Abdomen Tenderness] : non-tender [FreeTextEntry1] : Crepitations noted in the right shoulder with movement. Skin intact. No swelling. No redness.  [Nail Clubbing] : no clubbing of the fingernails

## 2024-09-18 NOTE — REVIEW OF SYSTEMS
[Hypertension] : ~T hypertension [Numbness] : numbness [Memory Loss] : ~T memory lapses or loss [Fatigue] : no fatigue [Poor Appetite] : normal appetite  [Nasal Congestion] : no nasal congestion [Dyspnea] : no dyspnea [Chest Tightness] : no chest tightness [Wheezing] : no wheezing [Hypotension] : no hypotension [Palpitations] : no palpitations [Edema] : ~T edema was not present [Nasal Discharge] : no nasal discharge [Heartburn] : no heartburn [Indigestion] : no indigestion [Dysuria] : no dysuria [Back Pain] : ~T no back pain [Myalgias] : no myalgias [Arthralgias] : no arthralgias [Rash] : no [unfilled] rash [Anemia] : no anemia [Easy Bruising] : no ~M tendency for easy bruising [Headache] : no headache [Dizziness] : no dizziness [Syncope] : no fainting [Focal Weakness] : no focal weakness [Paralysis] : no paralysis was seen [Seizures] : no seizures [Confusion] : no confusion [Head Injury] : no head injury [Involuntary Movements] : ~T no involuntary movements [Tremor] : ~T no ~M tremor was seen [Anxiety] : no anxiety [Diabetes] : no diabetes mellitus [Thyroid Problem] : no thyroid problem [DVT] : no DVT [Pulmonary Embolism] : no pulmonary embolism

## 2024-09-19 LAB
ALBUMIN SERPL ELPH-MCNC: 4.1 G/DL
ALP BLD-CCNC: 74 U/L
ALT SERPL-CCNC: 13 U/L
ANION GAP SERPL CALC-SCNC: 13 MMOL/L
AST SERPL-CCNC: 17 U/L
BILIRUB SERPL-MCNC: 0.3 MG/DL
BUN SERPL-MCNC: 25 MG/DL
CALCIUM SERPL-MCNC: 9.4 MG/DL
CHLORIDE SERPL-SCNC: 106 MMOL/L
CO2 SERPL-SCNC: 22 MMOL/L
CREAT SERPL-MCNC: 1.22 MG/DL
EGFR: 40 ML/MIN/1.73M2
GLUCOSE SERPL-MCNC: 90 MG/DL
HCT VFR BLD CALC: 39.9 %
HGB BLD-MCNC: 12.1 G/DL
MCHC RBC-ENTMCNC: 28.7 PG
MCHC RBC-ENTMCNC: 30.3 GM/DL
MCV RBC AUTO: 94.8 FL
PLATELET # BLD AUTO: 305 K/UL
POTASSIUM SERPL-SCNC: 4.4 MMOL/L
PROT SERPL-MCNC: 7.1 G/DL
RBC # BLD: 4.21 M/UL
RBC # FLD: 15 %
SODIUM SERPL-SCNC: 141 MMOL/L
WBC # FLD AUTO: 9.86 K/UL

## 2024-09-19 RX ORDER — VALSARTAN AND HYDROCHLOROTHIAZIDE 160; 12.5 MG/1; MG/1
160-12.5 TABLET, FILM COATED ORAL
Qty: 90 | Refills: 0 | Status: ACTIVE | COMMUNITY
Start: 2024-09-19 | End: 1900-01-01

## 2024-12-12 ENCOUNTER — RX RENEWAL (OUTPATIENT)
Age: 88
End: 2024-12-12

## 2024-12-30 ENCOUNTER — APPOINTMENT (OUTPATIENT)
Dept: PULMONOLOGY | Facility: CLINIC | Age: 88
End: 2024-12-30
Payer: MEDICARE

## 2024-12-30 VITALS — DIASTOLIC BLOOD PRESSURE: 78 MMHG | SYSTOLIC BLOOD PRESSURE: 148 MMHG

## 2024-12-30 VITALS
SYSTOLIC BLOOD PRESSURE: 168 MMHG | BODY MASS INDEX: 22.44 KG/M2 | WEIGHT: 104 LBS | DIASTOLIC BLOOD PRESSURE: 76 MMHG | TEMPERATURE: 97.8 F | OXYGEN SATURATION: 96 % | HEART RATE: 94 BPM | HEIGHT: 57 IN

## 2024-12-30 DIAGNOSIS — M25.472 EFFUSION, RIGHT ANKLE: ICD-10-CM

## 2024-12-30 DIAGNOSIS — M25.471 EFFUSION, RIGHT ANKLE: ICD-10-CM

## 2024-12-30 DIAGNOSIS — I10 ESSENTIAL (PRIMARY) HYPERTENSION: ICD-10-CM

## 2024-12-30 PROCEDURE — 99214 OFFICE O/P EST MOD 30 MIN: CPT

## 2025-01-03 NOTE — OCCUPATIONAL THERAPY INITIAL EVALUATION ADULT - EATING, PREVIOUS LEVEL OF FUNCTION, OT EVAL
LVM regarding referral to gastroenterology. Inquiring if has been scheduled or has been seen, Gave phone number to [facility] [employer] at [phone] to call and schedule     Relay  
independent

## 2025-03-26 ENCOUNTER — APPOINTMENT (OUTPATIENT)
Dept: PULMONOLOGY | Facility: CLINIC | Age: 89
End: 2025-03-26
Payer: MEDICARE

## 2025-03-26 ENCOUNTER — NON-APPOINTMENT (OUTPATIENT)
Age: 89
End: 2025-03-26

## 2025-03-26 VITALS
OXYGEN SATURATION: 99 % | HEART RATE: 65 BPM | SYSTOLIC BLOOD PRESSURE: 130 MMHG | DIASTOLIC BLOOD PRESSURE: 84 MMHG | TEMPERATURE: 97.1 F | BODY MASS INDEX: 23.37 KG/M2 | WEIGHT: 108 LBS

## 2025-03-26 VITALS — DIASTOLIC BLOOD PRESSURE: 88 MMHG | SYSTOLIC BLOOD PRESSURE: 138 MMHG

## 2025-03-26 DIAGNOSIS — I10 ESSENTIAL (PRIMARY) HYPERTENSION: ICD-10-CM

## 2025-03-26 PROCEDURE — 99214 OFFICE O/P EST MOD 30 MIN: CPT | Mod: 25

## 2025-03-26 PROCEDURE — 36415 COLL VENOUS BLD VENIPUNCTURE: CPT

## 2025-03-31 LAB
ALBUMIN SERPL ELPH-MCNC: 4.2 G/DL
ALP BLD-CCNC: 102 U/L
ALT SERPL-CCNC: 20 U/L
ANION GAP SERPL CALC-SCNC: 13 MMOL/L
AST SERPL-CCNC: 22 U/L
BILIRUB SERPL-MCNC: 0.2 MG/DL
BUN SERPL-MCNC: 32 MG/DL
CALCIUM SERPL-MCNC: 9.5 MG/DL
CHLORIDE SERPL-SCNC: 106 MMOL/L
CO2 SERPL-SCNC: 21 MMOL/L
CREAT SERPL-MCNC: 1.49 MG/DL
EGFRCR SERPLBLD CKD-EPI 2021: 32 ML/MIN/1.73M2
GLUCOSE SERPL-MCNC: 96 MG/DL
HCT VFR BLD CALC: 37.7 %
HGB BLD-MCNC: 11.7 G/DL
MCHC RBC-ENTMCNC: 30.2 PG
MCHC RBC-ENTMCNC: 31 G/DL
MCV RBC AUTO: 97.2 FL
PLATELET # BLD AUTO: 311 K/UL
POTASSIUM SERPL-SCNC: 5.3 MMOL/L
PROT SERPL-MCNC: 6.9 G/DL
RBC # BLD: 3.88 M/UL
RBC # FLD: 14.1 %
SODIUM SERPL-SCNC: 140 MMOL/L
WBC # FLD AUTO: 9.17 K/UL

## 2025-03-31 RX ORDER — HYDROCHLOROTHIAZIDE 12.5 MG/1
12.5 TABLET ORAL
Qty: 90 | Refills: 1 | Status: ACTIVE | COMMUNITY
Start: 2025-03-31 | End: 1900-01-01

## 2025-04-01 NOTE — ED ADULT NURSE NOTE - PRIMARY CARE PROVIDER
- Turn and position for postural drainage.  - Continue with Robinul 0.4mg IV q6hr PRN. ( 1 required in a 24hr period 7am-7am) unkn

## 2025-04-01 NOTE — DISCHARGE NOTE NURSING/CASE MANAGEMENT/SOCIAL WORK - NURSING SECTION COMPLETE
HPI:  Pt is a 71 years old female with Adrenal tumor, s/p  Adrenalectomy 2021, Chronic GERD, hiatal hernia, HTN, Obesity, Pt reports longstanding knee pain now progressively worsening. Pt denies traumatic injury, denies fall. Pt reports pain is aggravated with walking, prolonged standing, rising from a seated position to stand. Pt rate pain as maximum 5/10 and at rest 2/10,  Pt5 reports prior conservative treatment of steroid injection, last cortisone injection October 2024"? Pt currently take extra Tylenol for pain with mild relief .  Pt s/p left Robotic assisted TKR with TONYA 9/28/2022 by Dr. Delarosa . Now S/P right knee replacement  3/31/25 with Dr. Delarosa          PAST MEDICAL & SURGICAL HISTORY:  HTN (hypertension)      Obesity      Chronic GERD      Gastritis      H/O hiatal hernia      Adrenal tumor      Unilateral primary osteoarthritis, right knee      S/P cholecystectomy      S/P cataract surgery      H/O excision of mass  benign mass from forehead      H/O breast biopsy      S/P anal fissurectomy      History of adrenal surgery  2021      Home Medications:   · 	aspirin 81 mg oral delayed release tablet:  1 tab(s) orally once a day  · 	omeprazole 40 mg oral delayed release capsule:  1 cap(s) orally once a day  · 	dilTIAZem 120 mg/24 hours oral capsule, extended release: 1 cap(s) orally once a day  · 	famotidine 20 mg oral tablet:  1 tab(s) orally once a day  · 	fluocinolone 0.01% topical cream:   , Apply topically to affected area  · 	Vitamin D3 1000 intl units (25 mcg) oral tablet  , 1 tab(s) orally once a day  · 	Multiple Vitamins oral tablet:  1 tab(s) orally  · 	fenofibrate 160 mg oral tablet , 1 tab(s) orally once a day  · 	Probiotic Formula oral capsule     MEDICATIONS  (STANDING):  acetaminophen     Tablet .. 975 milliGRAM(s) Oral every 8 hours  aspirin enteric coated 81 milliGRAM(s) Oral two times a day  diltiazem    milliGRAM(s) Oral daily  famotidine    Tablet 20 milliGRAM(s) Oral daily  ketorolac   Injectable 15 milliGRAM(s) IV Push every 6 hours  pantoprazole    Tablet 40 milliGRAM(s) Oral before breakfast  polyethylene glycol 3350 17 Gram(s) Oral at bedtime  senna 2 Tablet(s) Oral at bedtime  sodium chloride 0.9%. 1000 milliLiter(s) (100 mL/Hr) IV Continuous <Continuous>    MEDICATIONS  (PRN):  aluminum hydroxide/magnesium hydroxide/simethicone Suspension 30 milliLiter(s) Oral four times a day PRN Indigestion  HYDROmorphone   Tablet 2 milliGRAM(s) Oral every 3 hours PRN Severe Pain (7 - 10)  magnesium hydroxide Suspension 30 milliLiter(s) Oral daily PRN Constipation  ondansetron Injectable 4 milliGRAM(s) IV Push every 6 hours PRN Nausea and/or Vomiting  oxyCODONE    IR 5 milliGRAM(s) Oral every 3 hours PRN Mild Pain (1 - 3)  oxyCODONE    IR 10 milliGRAM(s) Oral every 3 hours PRN Moderate Pain (4 - 6)      Allergies    No Known Allergies    Intolerances        SOCIAL HISTORY:  Denies ETOH/IVDA  Never a smoker    FAMILY HISTORY:  FH: breast cancer (Sibling, Mother)    FH: ovarian cancer (Mother)    FH: colon cancer (Mother)    FH: lung cancer (Mother)        Vital Signs Last 24 Hrs  T(C): 36.6 (01 Apr 2025 05:58), Max: 36.7 (31 Mar 2025 11:08)  T(F): 97.8 (01 Apr 2025 05:58), Max: 98.1 (31 Mar 2025 11:08)  HR: 78 (01 Apr 2025 05:58) (73 - 89)  BP: 112/68 (01 Apr 2025 05:58) (109/69 - 137/100)  BP(mean): 82 (31 Mar 2025 21:00) (79 - 94)  RR: 18 (01 Apr 2025 05:58) (12 - 20)  SpO2: 92% (01 Apr 2025 05:58) (92% - 100%)    Parameters below as of 01 Apr 2025 05:58  Patient On (Oxygen Delivery Method): room air    PHYSICAL EXAM:    GENERAL: Awake, alert. NAD  HEENT: PERRL, +EOMI  NECK: soft, Supple, No JVD,   CHEST/LUNG: Clear to auscultate bilaterally; No wheezing  HEART: S1S2+, Regular rate and rhythm; No murmurs  ABDOMEN: Soft, Nontender, Nondistended; Bowel sounds present  EXTREMITIES:  2+ Peripheral Pulses, No edema, right  Knee dressing C/D/I  SKIN: No rashes or lesions  NEURO: No focal deficits, no motor or sensory loss  PSYCH: Calm and cooperative    LABS:                  RADIOLOGY & ADDITIONAL STUDIES: HPI:  Pt is a 71 years old female with Adrenal tumor, s/p  Adrenalectomy 2021, Chronic GERD, hiatal hernia, HTN, Obesity, Pt reports longstanding knee pain now progressively worsening. Pt denies traumatic injury, denies fall. Pt reports pain is aggravated with walking, prolonged standing, rising from a seated position to stand. Pt rate pain as maximum 5/10 and at rest 2/10,  Pt5 reports prior conservative treatment of steroid injection, last cortisone injection October 2024"? Pt currently take extra Tylenol for pain with mild relief .  Pt s/p left Robotic assisted TKR with TONYA 9/28/2022 by Dr. Delarosa . Now S/P right knee replacement  3/31/25 with Dr. Delarosa. Seen and examined POD#1. No acute issues overnight. Pain controlled on current RX. Denies chest pain, SOB, dizziness, nausea, vomiting, fever, chills.          PAST MEDICAL & SURGICAL HISTORY:  HTN (hypertension)      Obesity      Chronic GERD      Gastritis      H/O hiatal hernia      Adrenal tumor      Unilateral primary osteoarthritis, right knee      S/P cholecystectomy      S/P cataract surgery      H/O excision of mass  benign mass from forehead      H/O breast biopsy      S/P anal fissurectomy      History of adrenal surgery  2021      Home Medications:   · 	aspirin 81 mg oral delayed release tablet:  1 tab(s) orally once a day  · 	omeprazole 40 mg oral delayed release capsule:  1 cap(s) orally once a day  · 	dilTIAZem 120 mg/24 hours oral capsule, extended release: 1 cap(s) orally once a day  · 	famotidine 20 mg oral tablet:  1 tab(s) orally once a day  · 	fluocinolone 0.01% topical cream:   , Apply topically to affected area  · 	Vitamin D3 1000 intl units (25 mcg) oral tablet  , 1 tab(s) orally once a day  · 	Multiple Vitamins oral tablet:  1 tab(s) orally  · 	fenofibrate 160 mg oral tablet , 1 tab(s) orally once a day  · 	Probiotic Formula oral capsule     MEDICATIONS  (STANDING):  acetaminophen     Tablet .. 975 milliGRAM(s) Oral every 8 hours  aspirin enteric coated 81 milliGRAM(s) Oral two times a day  diltiazem    milliGRAM(s) Oral daily  famotidine    Tablet 20 milliGRAM(s) Oral daily  ketorolac   Injectable 15 milliGRAM(s) IV Push every 6 hours  pantoprazole    Tablet 40 milliGRAM(s) Oral before breakfast  polyethylene glycol 3350 17 Gram(s) Oral at bedtime  senna 2 Tablet(s) Oral at bedtime  sodium chloride 0.9%. 1000 milliLiter(s) (100 mL/Hr) IV Continuous <Continuous>    MEDICATIONS  (PRN):  aluminum hydroxide/magnesium hydroxide/simethicone Suspension 30 milliLiter(s) Oral four times a day PRN Indigestion  HYDROmorphone   Tablet 2 milliGRAM(s) Oral every 3 hours PRN Severe Pain (7 - 10)  magnesium hydroxide Suspension 30 milliLiter(s) Oral daily PRN Constipation  ondansetron Injectable 4 milliGRAM(s) IV Push every 6 hours PRN Nausea and/or Vomiting  oxyCODONE    IR 5 milliGRAM(s) Oral every 3 hours PRN Mild Pain (1 - 3)  oxyCODONE    IR 10 milliGRAM(s) Oral every 3 hours PRN Moderate Pain (4 - 6)      Allergies    No Known Allergies    Intolerances        SOCIAL HISTORY:  Denies ETOH/IVDA  Never a smoker    FAMILY HISTORY:  FH: breast cancer (Sibling, Mother)    FH: ovarian cancer (Mother)    FH: colon cancer (Mother)    FH: lung cancer (Mother)        Vital Signs Last 24 Hrs  T(C): 36.6 (01 Apr 2025 05:58), Max: 36.7 (31 Mar 2025 11:08)  T(F): 97.8 (01 Apr 2025 05:58), Max: 98.1 (31 Mar 2025 11:08)  HR: 78 (01 Apr 2025 05:58) (73 - 89)  BP: 112/68 (01 Apr 2025 05:58) (109/69 - 137/100)  BP(mean): 82 (31 Mar 2025 21:00) (79 - 94)  RR: 18 (01 Apr 2025 05:58) (12 - 20)  SpO2: 92% (01 Apr 2025 05:58) (92% - 100%)    Parameters below as of 01 Apr 2025 05:58  Patient On (Oxygen Delivery Method): room air    PHYSICAL EXAM:    GENERAL: Awake, alert. NAD  HEENT: PERRL, +EOMI  NECK: soft, Supple, No JVD,   CHEST/LUNG: Clear to auscultate bilaterally; No wheezing  HEART: S1S2+, Regular rate and rhythm; No murmurs  ABDOMEN: Soft, Nontender, Nondistended; Bowel sounds present  EXTREMITIES:  2+ Peripheral Pulses, No edema, right  Knee dressing C/D/I  SKIN: No rashes or lesions  NEURO: No focal deficits, no motor or sensory loss  PSYCH: Calm and cooperative    LABS:                  RADIOLOGY & ADDITIONAL STUDIES: 71 years old female with Adrenal tumor, s/p  Adrenalectomy 2021, Chronic GERD, hiatal hernia, HTN, Obesity, Pt reports longstanding knee pain now progressively worsening. Pt denies traumatic injury, denies fall. Pt reports pain is aggravated with walking, prolonged standing, rising from a seated position to stand. Pt rate pain as maximum 5/10 and at rest 2/10,  Pt5 reports prior conservative treatment of steroid injection, last cortisone injection October 2024"? Pt currently take extra Tylenol for pain with mild relief .  Pt s/p left Robotic assisted TKR with TONYA 9/28/2022 by Dr. Delarosa . Now S/P right knee replacement  3/31/25 with Dr. Delarosa. Seen and examined POD#1. No acute issues overnight. Pain controlled on current RX. Denies chest pain, SOB, dizziness, nausea, vomiting, fever, chills.          PAST MEDICAL & SURGICAL HISTORY:  HTN (hypertension)      Obesity      Chronic GERD      Gastritis      H/O hiatal hernia      Adrenal tumor      Unilateral primary osteoarthritis, right knee      S/P cholecystectomy      S/P cataract surgery      H/O excision of mass  benign mass from forehead      H/O breast biopsy      S/P anal fissurectomy      History of adrenal surgery  2021      Home Medications:   · 	aspirin 81 mg oral delayed release tablet:  1 tab(s) orally once a day  · 	omeprazole 40 mg oral delayed release capsule:  1 cap(s) orally once a day  · 	dilTIAZem 120 mg/24 hours oral capsule, extended release: 1 cap(s) orally once a day  · 	famotidine 20 mg oral tablet:  1 tab(s) orally once a day  · 	fluocinolone 0.01% topical cream:   , Apply topically to affected area  · 	Vitamin D3 1000 intl units (25 mcg) oral tablet  , 1 tab(s) orally once a day  · 	Multiple Vitamins oral tablet:  1 tab(s) orally  · 	fenofibrate 160 mg oral tablet , 1 tab(s) orally once a day  · 	Probiotic Formula oral capsule     MEDICATIONS  (STANDING):  acetaminophen     Tablet .. 975 milliGRAM(s) Oral every 8 hours  aspirin enteric coated 81 milliGRAM(s) Oral two times a day  diltiazem    milliGRAM(s) Oral daily  famotidine    Tablet 20 milliGRAM(s) Oral daily  ketorolac   Injectable 15 milliGRAM(s) IV Push every 6 hours  pantoprazole    Tablet 40 milliGRAM(s) Oral before breakfast  polyethylene glycol 3350 17 Gram(s) Oral at bedtime  senna 2 Tablet(s) Oral at bedtime  sodium chloride 0.9%. 1000 milliLiter(s) (100 mL/Hr) IV Continuous <Continuous>    MEDICATIONS  (PRN):  aluminum hydroxide/magnesium hydroxide/simethicone Suspension 30 milliLiter(s) Oral four times a day PRN Indigestion  HYDROmorphone   Tablet 2 milliGRAM(s) Oral every 3 hours PRN Severe Pain (7 - 10)  magnesium hydroxide Suspension 30 milliLiter(s) Oral daily PRN Constipation  ondansetron Injectable 4 milliGRAM(s) IV Push every 6 hours PRN Nausea and/or Vomiting  oxyCODONE    IR 5 milliGRAM(s) Oral every 3 hours PRN Mild Pain (1 - 3)  oxyCODONE    IR 10 milliGRAM(s) Oral every 3 hours PRN Moderate Pain (4 - 6)      Allergies    No Known Allergies    Intolerances        SOCIAL HISTORY:  Denies ETOH/IVDA  Never a smoker    FAMILY HISTORY:  FH: breast cancer (Sibling, Mother)    FH: ovarian cancer (Mother)    FH: colon cancer (Mother)    FH: lung cancer (Mother)        Vital Signs Last 24 Hrs  T(C): 36.6 (01 Apr 2025 05:58), Max: 36.7 (31 Mar 2025 11:08)  T(F): 97.8 (01 Apr 2025 05:58), Max: 98.1 (31 Mar 2025 11:08)  HR: 78 (01 Apr 2025 05:58) (73 - 89)  BP: 112/68 (01 Apr 2025 05:58) (109/69 - 137/100)  BP(mean): 82 (31 Mar 2025 21:00) (79 - 94)  RR: 18 (01 Apr 2025 05:58) (12 - 20)  SpO2: 92% (01 Apr 2025 05:58) (92% - 100%)    Parameters below as of 01 Apr 2025 05:58  Patient On (Oxygen Delivery Method): room air    PHYSICAL EXAM:    GENERAL: Awake, alert. NAD  HEENT: PERRL, +EOMI  NECK: soft, Supple, No JVD,   CHEST/LUNG: Clear to auscultate bilaterally; No wheezing  HEART: S1S2+, Regular rate and rhythm; No murmurs  ABDOMEN: Soft, Nontender, Nondistended; Bowel sounds present  EXTREMITIES:  1+ Peripheral Pulses, No edema, right  Knee dressing C/D/I  SKIN: No rashes or lesions  NEURO: No focal deficits, no motor or sensory loss  PSYCH: Calm and cooperative   Patient/Caregiver provided printed discharge information.

## 2025-09-10 ENCOUNTER — APPOINTMENT (OUTPATIENT)
Dept: PULMONOLOGY | Facility: CLINIC | Age: 89
End: 2025-09-10
Payer: MEDICARE

## 2025-09-10 VITALS
DIASTOLIC BLOOD PRESSURE: 74 MMHG | HEIGHT: 57 IN | BODY MASS INDEX: 23.08 KG/M2 | WEIGHT: 107 LBS | SYSTOLIC BLOOD PRESSURE: 132 MMHG | OXYGEN SATURATION: 99 % | TEMPERATURE: 97.6 F | HEART RATE: 65 BPM

## 2025-09-10 DIAGNOSIS — I10 ESSENTIAL (PRIMARY) HYPERTENSION: ICD-10-CM

## 2025-09-10 DIAGNOSIS — M25.472 EFFUSION, RIGHT ANKLE: ICD-10-CM

## 2025-09-10 DIAGNOSIS — M25.471 EFFUSION, RIGHT ANKLE: ICD-10-CM

## 2025-09-10 PROCEDURE — G2211 COMPLEX E/M VISIT ADD ON: CPT

## 2025-09-10 PROCEDURE — 90662 IIV NO PRSV INCREASED AG IM: CPT

## 2025-09-10 PROCEDURE — G0008: CPT

## 2025-09-10 PROCEDURE — 99214 OFFICE O/P EST MOD 30 MIN: CPT

## (undated) DEVICE — STAPLER SKIN VISI-STAT 35 WIDE

## (undated) DEVICE — GOWN TRIMAX LG

## (undated) DEVICE — TAPE SILK 3"

## (undated) DEVICE — PACK HIP PINNING

## (undated) DEVICE — GLV 7 PROTEXIS (WHITE)

## (undated) DEVICE — SPECIMEN CONTAINER 100ML

## (undated) DEVICE — DRAPE MAYO STAND 30"

## (undated) DEVICE — SOL IRR POUR H2O 250ML

## (undated) DEVICE — WARMING BLANKET UPPER ADULT

## (undated) DEVICE — DRSG WEBRIL 6"

## (undated) DEVICE — MARKING PEN W RULER

## (undated) DEVICE — GLV 9 DURAPRENE

## (undated) DEVICE — SUT POLYSORB 1 36" GS-21 UNDYED

## (undated) DEVICE — VENODYNE/SCD SLEEVE CALF LARGE

## (undated) DEVICE — LAP PAD 18 X 18"

## (undated) DEVICE — GUIDEWIRE SYNTHES 3.2MM X 400MM

## (undated) DEVICE — GLV 6.5 PROTEXIS (WHITE)

## (undated) DEVICE — GLV 8 PROTEXIS (WHITE)

## (undated) DEVICE — SOL IRR POUR NS 0.9% 500ML

## (undated) DEVICE — POSITIONER FOAM EGG CRATE ULNAR 2PCS (PINK)

## (undated) DEVICE — DRILL BIT SYNTHES ORTHO CALIBRATED 4.2MM X 330MM

## (undated) DEVICE — SUT POLYSORB 0 30" GS-21 UNDYED

## (undated) DEVICE — FOLEY TRAY 16FR 5CC LTX UMETER CLOSED

## (undated) DEVICE — DRSG ACE BANDAGE 6"

## (undated) DEVICE — GLV 7.5 PROTEXIS (WHITE)

## (undated) DEVICE — GLV 8.5 PROTEXIS (WHITE)

## (undated) DEVICE — MEDICATION LABELS W MARKER

## (undated) DEVICE — DRAPE TOWEL BLUE 17" X 24"

## (undated) DEVICE — SUT POLYSORB 2-0 30" GS-21 UNDYED

## (undated) DEVICE — DRSG TAPE MICROFOAM 3"